# Patient Record
Sex: FEMALE | Race: WHITE
[De-identification: names, ages, dates, MRNs, and addresses within clinical notes are randomized per-mention and may not be internally consistent; named-entity substitution may affect disease eponyms.]

---

## 2021-01-06 ENCOUNTER — HOSPITAL ENCOUNTER (EMERGENCY)
Dept: HOSPITAL 1 - ED | Age: 71
LOS: 1 days | Discharge: HOME | End: 2021-01-07
Payer: COMMERCIAL

## 2021-01-06 VITALS — WEIGHT: 90 LBS | BODY MASS INDEX: 15.36 KG/M2 | HEIGHT: 64 IN

## 2021-01-06 DIAGNOSIS — Z88.6: ICD-10-CM

## 2021-01-06 DIAGNOSIS — G43.909: ICD-10-CM

## 2021-01-06 DIAGNOSIS — M79.7: ICD-10-CM

## 2021-01-06 DIAGNOSIS — Z88.5: ICD-10-CM

## 2021-01-06 DIAGNOSIS — U07.1: Primary | ICD-10-CM

## 2021-01-06 LAB
ALBUMIN SERPL-MCNC: 3.3 G/DL (ref 3.4–5)
ALP SERPL-CCNC: 79 U/L (ref 46–116)
ALT SERPL-CCNC: 19 U/L (ref 14–59)
AST SERPL-CCNC: 25 U/L (ref 15–37)
BASOPHILS NFR BLD: 0.6 % (ref 0.2–1.3)
BILIRUB SERPL-MCNC: 0.9 MG/DL (ref 0.2–1)
BUN SERPL-MCNC: 59 MG/DL (ref 7–18)
CALCIUM SERPL-MCNC: 8.5 MG/DL (ref 8.5–10.1)
CHLORIDE SERPL-SCNC: 107 MMOL/L (ref 98–107)
CO2 SERPL-SCNC: 22.8 MMOL/L (ref 21–32)
CREAT SERPL-MCNC: 2.1 MG/DL (ref 0.6–1)
ERYTHROCYTE [DISTWIDTH] IN BLOOD BY AUTOMATED COUNT: 14 % (ref 12.3–17.7)
GFR SERPLBLD BASED ON 1.73 SQ M-ARVRAT: 25 ML/MIN
GLUCOSE SERPL-MCNC: 131 MG/DL (ref 74–106)
LIPASE SERPL-CCNC: 97 IU/L (ref 73–393)
PLATELET # BLD: 150 X10^3MCL (ref 179–408)
POTASSIUM SERPL-SCNC: 3.8 MMOL/L (ref 3.5–5.1)
PROT SERPL-MCNC: 7.1 G/DL (ref 6.4–8.2)
SODIUM SERPL-SCNC: 144 MMOL/L (ref 136–145)

## 2021-01-06 PROCEDURE — U0003 INFECTIOUS AGENT DETECTION BY NUCLEIC ACID (DNA OR RNA); SEVERE ACUTE RESPIRATORY SYNDROME CORONAVIRUS 2 (SARS-COV-2) (CORONAVIRUS DISEASE [COVID-19]), AMPLIFIED PROBE TECHNIQUE, MAKING USE OF HIGH THROUGHPUT TECHNOLOGIES AS DESCRIBED BY CMS-2020-01-R: HCPCS

## 2021-01-07 VITALS — SYSTOLIC BLOOD PRESSURE: 101 MMHG | DIASTOLIC BLOOD PRESSURE: 56 MMHG

## 2023-04-29 ENCOUNTER — APPOINTMENT (OUTPATIENT)
Dept: GENERAL RADIOLOGY | Age: 73
DRG: 542 | End: 2023-04-29
Payer: COMMERCIAL

## 2023-04-29 ENCOUNTER — HOSPITAL ENCOUNTER (INPATIENT)
Age: 73
LOS: 3 days | Discharge: HOME HEALTH | DRG: 542 | End: 2023-05-03
Attending: EMERGENCY MEDICINE | Admitting: STUDENT IN AN ORGANIZED HEALTH CARE EDUCATION/TRAINING PROGRAM
Payer: COMMERCIAL

## 2023-04-29 DIAGNOSIS — S72.102A CLOSED FRACTURE OF TROCHANTER OF LEFT FEMUR, INITIAL ENCOUNTER (HCC): Primary | ICD-10-CM

## 2023-04-29 DIAGNOSIS — M79.672 ACUTE FOOT PAIN, LEFT: ICD-10-CM
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
## 2023-04-29 PROCEDURE — 73502 X-RAY EXAM HIP UNI 2-3 VIEWS: CPT

## 2023-04-29 PROCEDURE — 73564 X-RAY EXAM KNEE 4 OR MORE: CPT

## 2023-04-29 PROCEDURE — 99285 EMERGENCY DEPT VISIT HI MDM: CPT

## 2023-04-29 PROCEDURE — 73590 X-RAY EXAM OF LOWER LEG: CPT

## 2023-04-29 ASSESSMENT — PAIN DESCRIPTION - LOCATION: LOCATION: LEG;HIP

## 2023-04-29 ASSESSMENT — PAIN - FUNCTIONAL ASSESSMENT: PAIN_FUNCTIONAL_ASSESSMENT: 0-10

## 2023-04-29 ASSESSMENT — PAIN DESCRIPTION - ORIENTATION: ORIENTATION: LEFT

## 2023-04-29 ASSESSMENT — PAIN SCALES - GENERAL: PAINLEVEL_OUTOF10: 10

## 2023-04-30 ENCOUNTER — APPOINTMENT (OUTPATIENT)
Dept: CT IMAGING | Age: 73
DRG: 542 | End: 2023-04-30
Payer: COMMERCIAL

## 2023-04-30 ENCOUNTER — APPOINTMENT (OUTPATIENT)
Dept: GENERAL RADIOLOGY | Age: 73
DRG: 542 | End: 2023-04-30
Payer: COMMERCIAL

## 2023-04-30 PROBLEM — S72.002A FRACTURE, HIP, LEFT, CLOSED, INITIAL ENCOUNTER (HCC): Status: ACTIVE | Noted: 2023-04-30

## 2023-04-30 LAB
ALBUMIN SERPL-MCNC: 3.9 GM/DL (ref 3.4–5)
ALP BLD-CCNC: 103 IU/L (ref 40–128)
ALT SERPL-CCNC: 7 U/L (ref 10–40)
ANION GAP SERPL CALCULATED.3IONS-SCNC: 10 MMOL/L (ref 4–16)
AST SERPL-CCNC: 19 IU/L (ref 15–37)
BACTERIA: NEGATIVE /HPF
BASOPHILS ABSOLUTE: 0 K/CU MM
BASOPHILS RELATIVE PERCENT: 0.6 % (ref 0–1)
BILIRUB SERPL-MCNC: 0.8 MG/DL (ref 0–1)
BILIRUBIN URINE: NEGATIVE MG/DL
BLOOD, URINE: NEGATIVE
BUN SERPL-MCNC: 30 MG/DL (ref 6–23)
CALCIUM SERPL-MCNC: 9.3 MG/DL (ref 8.3–10.6)
CHLORIDE BLD-SCNC: 105 MMOL/L (ref 99–110)
CLARITY: CLEAR
CO2: 24 MMOL/L (ref 21–32)
COLOR: YELLOW
CREAT SERPL-MCNC: 1 MG/DL (ref 0.6–1.1)
DIFFERENTIAL TYPE: ABNORMAL
EOSINOPHILS ABSOLUTE: 0.1 K/CU MM
EOSINOPHILS RELATIVE PERCENT: 1.6 % (ref 0–3)
GFR SERPL CREATININE-BSD FRML MDRD: 59 ML/MIN/1.73M2
GLUCOSE SERPL-MCNC: 92 MG/DL (ref 70–99)
GLUCOSE, URINE: NEGATIVE MG/DL
HCT VFR BLD CALC: 32.6 % (ref 37–47)
HEMOGLOBIN: 10.3 GM/DL (ref 12.5–16)
IMMATURE NEUTROPHIL %: 0.7 % (ref 0–0.43)
IRON: 57 UG/DL (ref 37–145)
KETONES, URINE: NEGATIVE MG/DL
LEUKOCYTE ESTERASE, URINE: ABNORMAL
LYMPHOCYTES ABSOLUTE: 2 K/CU MM
LYMPHOCYTES RELATIVE PERCENT: 28.2 % (ref 24–44)
MCH RBC QN AUTO: 29.7 PG (ref 27–31)
MCHC RBC AUTO-ENTMCNC: 31.6 % (ref 32–36)
MCV RBC AUTO: 93.9 FL (ref 78–100)
MONOCYTES ABSOLUTE: 0.5 K/CU MM
MONOCYTES RELATIVE PERCENT: 7.6 % (ref 0–4)
MUCUS: ABNORMAL HPF
NITRITE URINE, QUANTITATIVE: NEGATIVE
NUCLEATED RBC %: 0 %
PCT TRANSFERRIN: 19 % (ref 10–44)
PDW BLD-RTO: 12.7 % (ref 11.7–14.9)
PH, URINE: 5.5 (ref 5–8)
PLATELET # BLD: 242 K/CU MM (ref 140–440)
PMV BLD AUTO: 9.2 FL (ref 7.5–11.1)
POTASSIUM SERPL-SCNC: 4.1 MMOL/L (ref 3.5–5.1)
PROTEIN UA: ABNORMAL MG/DL
RBC # BLD: 3.47 M/CU MM (ref 4.2–5.4)
RBC URINE: <1 /HPF (ref 0–6)
SEGMENTED NEUTROPHILS ABSOLUTE COUNT: 4.3 K/CU MM
SEGMENTED NEUTROPHILS RELATIVE PERCENT: 61.3 % (ref 36–66)
SODIUM BLD-SCNC: 139 MMOL/L (ref 135–145)
SPECIFIC GRAVITY UA: 1.01 (ref 1–1.03)
SQUAMOUS EPITHELIAL: 1 /HPF
TOTAL IMMATURE NEUTOROPHIL: 0.05 K/CU MM
TOTAL IRON BINDING CAPACITY: 299 UG/DL (ref 250–450)
TOTAL NUCLEATED RBC: 0 K/CU MM
TOTAL PROTEIN: 6.4 GM/DL (ref 6.4–8.2)
TRICHOMONAS: ABNORMAL /HPF
UNSATURATED IRON BINDING CAPACITY: 242 UG/DL (ref 110–370)
UROBILINOGEN, URINE: 1 MG/DL (ref 0.2–1)
WBC # BLD: 7 K/CU MM (ref 4–10.5)
WBC UA: 3 /HPF (ref 0–5)

## 2023-04-30 PROCEDURE — 73630 X-RAY EXAM OF FOOT: CPT

## 2023-04-30 PROCEDURE — 6370000000 HC RX 637 (ALT 250 FOR IP): Performed by: NURSE PRACTITIONER

## 2023-04-30 PROCEDURE — 83550 IRON BINDING TEST: CPT

## 2023-04-30 PROCEDURE — 2580000003 HC RX 258: Performed by: STUDENT IN AN ORGANIZED HEALTH CARE EDUCATION/TRAINING PROGRAM

## 2023-04-30 PROCEDURE — 1200000000 HC SEMI PRIVATE

## 2023-04-30 PROCEDURE — 73700 CT LOWER EXTREMITY W/O DYE: CPT

## 2023-04-30 PROCEDURE — 81001 URINALYSIS AUTO W/SCOPE: CPT

## 2023-04-30 PROCEDURE — 85025 COMPLETE CBC W/AUTO DIFF WBC: CPT

## 2023-04-30 PROCEDURE — 83540 ASSAY OF IRON: CPT

## 2023-04-30 PROCEDURE — 6370000000 HC RX 637 (ALT 250 FOR IP): Performed by: STUDENT IN AN ORGANIZED HEALTH CARE EDUCATION/TRAINING PROGRAM

## 2023-04-30 PROCEDURE — 94761 N-INVAS EAR/PLS OXIMETRY MLT: CPT

## 2023-04-30 PROCEDURE — 36415 COLL VENOUS BLD VENIPUNCTURE: CPT

## 2023-04-30 PROCEDURE — 6360000002 HC RX W HCPCS: Performed by: EMERGENCY MEDICINE

## 2023-04-30 PROCEDURE — 80053 COMPREHEN METABOLIC PANEL: CPT

## 2023-04-30 RX ORDER — LANOLIN ALCOHOL/MO/W.PET/CERES
3 CREAM (GRAM) TOPICAL ONCE
Status: COMPLETED | OUTPATIENT
Start: 2023-05-01 | End: 2023-04-30

## 2023-04-30 RX ORDER — SODIUM CHLORIDE 0.9 % (FLUSH) 0.9 %
5-40 SYRINGE (ML) INJECTION PRN
Status: DISCONTINUED | OUTPATIENT
Start: 2023-04-30 | End: 2023-05-03 | Stop reason: HOSPADM

## 2023-04-30 RX ORDER — ONDANSETRON 4 MG/1
4 TABLET, ORALLY DISINTEGRATING ORAL EVERY 8 HOURS PRN
Status: DISCONTINUED | OUTPATIENT
Start: 2023-04-30 | End: 2023-05-03 | Stop reason: HOSPADM

## 2023-04-30 RX ORDER — POLYETHYLENE GLYCOL 3350 17 G/17G
17 POWDER, FOR SOLUTION ORAL DAILY PRN
Status: DISCONTINUED | OUTPATIENT
Start: 2023-04-30 | End: 2023-05-03 | Stop reason: HOSPADM

## 2023-04-30 RX ORDER — ONDANSETRON 2 MG/ML
4 INJECTION INTRAMUSCULAR; INTRAVENOUS ONCE
Status: DISCONTINUED | OUTPATIENT
Start: 2023-04-30 | End: 2023-05-03 | Stop reason: HOSPADM

## 2023-04-30 RX ORDER — ENOXAPARIN SODIUM 100 MG/ML
30 INJECTION SUBCUTANEOUS DAILY
Status: DISCONTINUED | OUTPATIENT
Start: 2023-04-30 | End: 2023-05-03 | Stop reason: HOSPADM

## 2023-04-30 RX ORDER — OXYCODONE HYDROCHLORIDE 5 MG/1
5 TABLET ORAL EVERY 4 HOURS PRN
Status: DISCONTINUED | OUTPATIENT
Start: 2023-04-30 | End: 2023-05-03 | Stop reason: HOSPADM

## 2023-04-30 RX ORDER — MORPHINE SULFATE 2 MG/ML
2 INJECTION, SOLUTION INTRAMUSCULAR; INTRAVENOUS EVERY 4 HOURS PRN
Status: DISCONTINUED | OUTPATIENT
Start: 2023-04-30 | End: 2023-05-03 | Stop reason: HOSPADM

## 2023-04-30 RX ORDER — FENTANYL CITRATE 50 UG/ML
25 INJECTION, SOLUTION INTRAMUSCULAR; INTRAVENOUS ONCE
Status: COMPLETED | OUTPATIENT
Start: 2023-04-30 | End: 2023-04-30

## 2023-04-30 RX ORDER — SODIUM CHLORIDE 9 MG/ML
INJECTION, SOLUTION INTRAVENOUS PRN
Status: DISCONTINUED | OUTPATIENT
Start: 2023-04-30 | End: 2023-05-03 | Stop reason: HOSPADM

## 2023-04-30 RX ORDER — ONDANSETRON 2 MG/ML
4 INJECTION INTRAMUSCULAR; INTRAVENOUS EVERY 6 HOURS PRN
Status: DISCONTINUED | OUTPATIENT
Start: 2023-04-30 | End: 2023-05-03 | Stop reason: HOSPADM

## 2023-04-30 RX ORDER — SODIUM CHLORIDE 0.9 % (FLUSH) 0.9 %
5-40 SYRINGE (ML) INJECTION EVERY 12 HOURS SCHEDULED
Status: DISCONTINUED | OUTPATIENT
Start: 2023-04-30 | End: 2023-05-03 | Stop reason: HOSPADM

## 2023-04-30 RX ADMIN — OXYCODONE 5 MG: 5 TABLET ORAL at 20:19

## 2023-04-30 RX ADMIN — SODIUM CHLORIDE, PRESERVATIVE FREE 10 ML: 5 INJECTION INTRAVENOUS at 20:19

## 2023-04-30 RX ADMIN — FENTANYL CITRATE 25 MCG: 50 INJECTION, SOLUTION INTRAMUSCULAR; INTRAVENOUS at 05:38

## 2023-04-30 RX ADMIN — Medication 3 MG: at 23:55

## 2023-04-30 ASSESSMENT — ENCOUNTER SYMPTOMS
EYE REDNESS: 0
COUGH: 0
COLOR CHANGE: 0
DIARRHEA: 0
SINUS PAIN: 0
ABDOMINAL PAIN: 0
NAUSEA: 0
WHEEZING: 0
SORE THROAT: 0
SHORTNESS OF BREATH: 0
CHEST TIGHTNESS: 0
VOMITING: 0
BACK PAIN: 0
CONSTIPATION: 0
SINUS PRESSURE: 0

## 2023-04-30 ASSESSMENT — PAIN DESCRIPTION - LOCATION: LOCATION: LEG;HIP

## 2023-04-30 ASSESSMENT — PAIN SCALES - GENERAL
PAINLEVEL_OUTOF10: 7
PAINLEVEL_OUTOF10: 2
PAINLEVEL_OUTOF10: 1

## 2023-04-30 ASSESSMENT — PAIN DESCRIPTION - ORIENTATION: ORIENTATION: LEFT

## 2023-04-30 ASSESSMENT — PAIN - FUNCTIONAL ASSESSMENT: PAIN_FUNCTIONAL_ASSESSMENT: NONE - DENIES PAIN

## 2023-05-01 PROBLEM — E43 SEVERE MALNUTRITION (HCC): Chronic | Status: ACTIVE | Noted: 2023-05-01

## 2023-05-01 LAB
ANION GAP SERPL CALCULATED.3IONS-SCNC: 8 MMOL/L (ref 4–16)
BASOPHILS ABSOLUTE: 0.1 K/CU MM
BASOPHILS RELATIVE PERCENT: 1 % (ref 0–1)
BUN SERPL-MCNC: 25 MG/DL (ref 6–23)
CALCIUM SERPL-MCNC: 8.5 MG/DL (ref 8.3–10.6)
CHLORIDE BLD-SCNC: 106 MMOL/L (ref 99–110)
CO2: 25 MMOL/L (ref 21–32)
CREAT SERPL-MCNC: 1.1 MG/DL (ref 0.6–1.1)
DIFFERENTIAL TYPE: ABNORMAL
EOSINOPHILS ABSOLUTE: 0.2 K/CU MM
EOSINOPHILS RELATIVE PERCENT: 2.4 % (ref 0–3)
GFR SERPL CREATININE-BSD FRML MDRD: 53 ML/MIN/1.73M2
GLUCOSE SERPL-MCNC: 104 MG/DL (ref 70–99)
HCT VFR BLD CALC: 32.8 % (ref 37–47)
HEMOGLOBIN: 9.9 GM/DL (ref 12.5–16)
IMMATURE NEUTROPHIL %: 0.8 % (ref 0–0.43)
LYMPHOCYTES ABSOLUTE: 1.6 K/CU MM
LYMPHOCYTES RELATIVE PERCENT: 25.4 % (ref 24–44)
MCH RBC QN AUTO: 29.7 PG (ref 27–31)
MCHC RBC AUTO-ENTMCNC: 30.2 % (ref 32–36)
MCV RBC AUTO: 98.5 FL (ref 78–100)
MONOCYTES ABSOLUTE: 0.6 K/CU MM
MONOCYTES RELATIVE PERCENT: 9.4 % (ref 0–4)
NUCLEATED RBC %: 0 %
PDW BLD-RTO: 12.8 % (ref 11.7–14.9)
PLATELET # BLD: 240 K/CU MM (ref 140–440)
PMV BLD AUTO: 9.5 FL (ref 7.5–11.1)
POTASSIUM SERPL-SCNC: 4.5 MMOL/L (ref 3.5–5.1)
RBC # BLD: 3.33 M/CU MM (ref 4.2–5.4)
SEGMENTED NEUTROPHILS ABSOLUTE COUNT: 3.7 K/CU MM
SEGMENTED NEUTROPHILS RELATIVE PERCENT: 61 % (ref 36–66)
SODIUM BLD-SCNC: 139 MMOL/L (ref 135–145)
TOTAL IMMATURE NEUTOROPHIL: 0.05 K/CU MM
TOTAL NUCLEATED RBC: 0 K/CU MM
WBC # BLD: 6.1 K/CU MM (ref 4–10.5)

## 2023-05-01 PROCEDURE — 97162 PT EVAL MOD COMPLEX 30 MIN: CPT

## 2023-05-01 PROCEDURE — 1200000000 HC SEMI PRIVATE

## 2023-05-01 PROCEDURE — 80048 BASIC METABOLIC PNL TOTAL CA: CPT

## 2023-05-01 PROCEDURE — 94761 N-INVAS EAR/PLS OXIMETRY MLT: CPT

## 2023-05-01 PROCEDURE — 85025 COMPLETE CBC W/AUTO DIFF WBC: CPT

## 2023-05-01 PROCEDURE — 97530 THERAPEUTIC ACTIVITIES: CPT

## 2023-05-01 PROCEDURE — 6360000002 HC RX W HCPCS: Performed by: STUDENT IN AN ORGANIZED HEALTH CARE EDUCATION/TRAINING PROGRAM

## 2023-05-01 PROCEDURE — 2580000003 HC RX 258: Performed by: STUDENT IN AN ORGANIZED HEALTH CARE EDUCATION/TRAINING PROGRAM

## 2023-05-01 PROCEDURE — 36415 COLL VENOUS BLD VENIPUNCTURE: CPT

## 2023-05-01 PROCEDURE — 97166 OT EVAL MOD COMPLEX 45 MIN: CPT

## 2023-05-01 RX ORDER — HYDROMORPHONE HYDROCHLORIDE 2 MG/1
2 TABLET ORAL EVERY 6 HOURS PRN
Qty: 12 TABLET | Refills: 0 | Status: SHIPPED | OUTPATIENT
Start: 2023-05-01 | End: 2023-05-04

## 2023-05-01 RX ORDER — ONDANSETRON 4 MG/1
4 TABLET, ORALLY DISINTEGRATING ORAL EVERY 8 HOURS PRN
Qty: 90 TABLET | Refills: 0 | Status: SHIPPED | OUTPATIENT
Start: 2023-05-01 | End: 2023-05-31

## 2023-05-01 RX ORDER — POLYETHYLENE GLYCOL 3350 17 G/17G
17 POWDER, FOR SOLUTION ORAL DAILY PRN
Qty: 527 G | Refills: 1 | Status: SHIPPED | OUTPATIENT
Start: 2023-05-01 | End: 2023-05-31

## 2023-05-01 RX ADMIN — SODIUM CHLORIDE, PRESERVATIVE FREE 10 ML: 5 INJECTION INTRAVENOUS at 22:18

## 2023-05-01 RX ADMIN — ONDANSETRON 4 MG: 2 INJECTION INTRAMUSCULAR; INTRAVENOUS at 06:23

## 2023-05-01 ASSESSMENT — PAIN SCALES - GENERAL: PAINLEVEL_OUTOF10: 0

## 2023-05-01 NOTE — DISCHARGE INSTRUCTIONS
Call to schedule follow up hospital follow up appointment:    [address] at [address]  201 [facility], 400 [facility] [address] [facility] [address] [phone]  [address] [place] [address] [phone]   [address] [place] [address]     Call for new patient Primary Care Physician appointment:     Physician Finder [phone]    Dr. Trinidad Claros and Dr. Wayne Lutz [phone] 502 [facility] -[phone] 5601 [place], [place]    Dr. Danuta Arredondo and Ane Bumpers -[phone]  3505 [facility], [address] Ashanti Taylor and Juan C Issa -[phone]  [facility], [address] [address] [facility],3Rd Floor  45 [facility], [address] [place] [address] 1411 [facility] Direct Primary Care [phone]   3651 [facility] 74*    Dr. Deforest Jeans PA and Eris Peterson [place] [phone]  [address] [place] [address], [address] [facility], [address] [address] Wait[address]*) [phone]   [facility]    Dr. Seth Cannon [phone]  [address] [facility], 5501 [facility] 6300 [facility] [phone]  [facility] 31 [place], [place]    Dr. Nieves Phoenix [phone] [address] [facility], [address] Ashanti Claros and Dr. Tawana Mg [phone]  [address] [place] [address], [address] Alexsandra CAPONE and Sylvia Alayessenia [phone]  [address] [place] [address], [address] [facility], [facility] and Cindra Carolina -[phone]   [facility] Alexsandra Sepulveda and Meryle Lincoln -[phone]  [phone], Suite [facility]    Dr. Laquita Bustos, Dr. Jeanna Foote [facility], Joes R Montoya [address]  and Oj Asher 6300 [facility]   [phone]  [address] [place] [address]

## 2023-05-01 NOTE — PLAN OF CARE
Problem: Discharge Planning  Goal: Discharge to home or other facility with appropriate resources  4/30/2023 2155 by Shady Latham LPN  Outcome: Progressing  4/30/2023 1700 by Delio Hook RN  Outcome: Progressing     Problem: Safety - Adult  Goal: Free from fall injury  4/30/2023 2155 by Shady Latham LPN  Outcome: Progressing  4/30/2023 1700 by Delio Hook RN  Outcome: Progressing     Problem: ABCDS Injury Assessment  Goal: Absence of physical injury  4/30/2023 2155 by Shady Latham LPN  Outcome: Progressing  4/30/2023 1708 by Delio Hook RN  Outcome: Progressing  4/30/2023 1700 by Delio Hook RN  Outcome: Progressing     Problem: Pain  Goal: Verbalizes/displays adequate comfort level or baseline comfort level  4/30/2023 2155 by Shady Latham LPN  Outcome: Progressing  4/30/2023 1708 by Delio Hook RN  Outcome: Progressing  4/30/2023 1700 by Delio Hook RN  Outcome: Progressing

## 2023-05-01 NOTE — CONSULTS
2813 [facility],2Nd Floor ACUTE CARE PHYSICAL THERAPY EVALUATION  Stephen Calderon, 1950, 1115/1115-A, 5/1/2023    History  Tribal:  The primary encounter diagnosis was Closed fracture of trochanter of left femur, initial encounter ([address] Diagnoses of Fall, initial encounter and Acute foot pain, left were also pertinent to this visit. Patient  has no past medical history on file. Patient  has a past surgical history that includes Wrist surgery. Subjective:  Patient states:  \"I moved here from [place]\". Pain:  10/10 L hip. Communication with other providers:  Handoff to RN  Restrictions: general precautions, fall risk, tele, pure wick, WBAT L LE    Home Setup/Prior level of function  Social/Functional History  Lives With: Son (son is special needs)  Type of Home: Trailer  Home Layout: One level  Home Access: Stairs to enter with rails  Entrance Stairs - Number of Steps: 4  Entrance Stairs - Rails: Both  Bathroom Shower/Tub: Tub/Shower unit  Bathroom Toilet: Standard  Bathroom Accessibility: Accessible  Home Equipment: Char Avi, 4 wheeled  Has the patient had two or more falls in the past year or any fall with injury in the past year?: No  Receives Help From: Family  ADL Assistance: Independent  Homemaking Assistance: Needs assistance  Homemaking Responsibilities: Yes  Ambulation Assistance: Independent  Transfer Assistance: Independent  Active : No  Occupation: Retired    Examination of body systems (includes body structures/functions, activity/participation limitations):  Observation:  In supine upon arrival  Vision:  [facility]  Hearing:  WFL  Cardiopulmonary:  WFL  Cognition: intact tangential, see OT/SLP note for further evaluation. Musculoskeletal  ROM R/L:  WFL. Strength R/L:  3+/5 R LE, L LE not tested due to safety, fair in function and endurance.     Neuro:  intact    Gait pattern: Pt demonstrates decreased bg, decreased step length, decreased foot clearance, step to gait

## 2023-05-01 NOTE — CARE COORDINATION
Case Management Assessment  Initial Evaluation    Date/Time of Evaluation: 5/1/2023 2:50 PM  Assessment Completed by: Shante Gonsalez RN    If patient is discharged prior to next notation, then this note serves as note for discharge by case management. Patient Name: Estela Santana                   YOB: 1950  Diagnosis: Closed fracture of trochanter of left femur, [facility] Aakash Matter  Fall, initial encounter [W19. XXXA]  Fracture, hip, left, closed, initial encounter [facility]) [S72.002A]  Acute foot pain, left [M79.672]                   Date / Time: 4/29/2023 10:45 PM    Patient Admission Status: Inpatient   Readmission Risk (Low < 19, Mod (19-27), High > 27): Readmission Risk Score: 11.1    Current PCP: No primary care provider on file. PCP verified by CM? No    Chart Reviewed: Yes      History Provided by: Patient  Patient Orientation: Alert and Oriented    Patient Cognition: Alert    Hospitalization in the last 30 days (Readmission):  No    If yes, Readmission Assessment in CM Navigator will be completed. Advance Directives:      Code Status: Full Code   Patient's Primary Decision Maker is: Legal Next of Kin    Primary Decision MakerTaylor Coronel [facility] - [phone]    Discharge Planning:    Patient lives with: Children Type of Home: Trailer/Mobile Home  Primary Care Giver: Self  Patient Support Systems include: Family Members   Current Financial resources: None  Current community resources: None  Current services prior to admission: None            Current DME:              Type of Home Care services:  None    ADLS  Prior functional level: Independent in ADLs/IADLs  Current functional level: Assistance with the following:, Mobility    PT AM-PAC: 17 /24  OT AM-PAC:   /24    Family can provide assistance at DC: Yes  Would you like Case Management to discuss the discharge plan with any other family members/significant others, and if so, who?  Yes  Plans to Return to Present Housing:

## 2023-05-02 LAB
ANION GAP SERPL CALCULATED.3IONS-SCNC: 9 MMOL/L (ref 4–16)
BASOPHILS ABSOLUTE: 0.1 K/CU MM
BASOPHILS RELATIVE PERCENT: 0.7 % (ref 0–1)
BUN SERPL-MCNC: 21 MG/DL (ref 6–23)
CALCIUM SERPL-MCNC: 8.6 MG/DL (ref 8.3–10.6)
CHLORIDE BLD-SCNC: 105 MMOL/L (ref 99–110)
CO2: 26 MMOL/L (ref 21–32)
CREAT SERPL-MCNC: 0.8 MG/DL (ref 0.6–1.1)
DIFFERENTIAL TYPE: ABNORMAL
EOSINOPHILS ABSOLUTE: 0.2 K/CU MM
EOSINOPHILS RELATIVE PERCENT: 2.2 % (ref 0–3)
GFR SERPL CREATININE-BSD FRML MDRD: >60 ML/MIN/1.73M2
GLUCOSE SERPL-MCNC: 94 MG/DL (ref 70–99)
HCT VFR BLD CALC: 32.1 % (ref 37–47)
HEMOGLOBIN: 10.2 GM/DL (ref 12.5–16)
IMMATURE NEUTROPHIL %: 0.7 % (ref 0–0.43)
LYMPHOCYTES ABSOLUTE: 1.6 K/CU MM
LYMPHOCYTES RELATIVE PERCENT: 23.4 % (ref 24–44)
MCH RBC QN AUTO: 29.7 PG (ref 27–31)
MCHC RBC AUTO-ENTMCNC: 31.8 % (ref 32–36)
MCV RBC AUTO: 93.3 FL (ref 78–100)
MONOCYTES ABSOLUTE: 0.6 K/CU MM
MONOCYTES RELATIVE PERCENT: 8 % (ref 0–4)
NUCLEATED RBC %: 0 %
PDW BLD-RTO: 12.7 % (ref 11.7–14.9)
PLATELET # BLD: 244 K/CU MM (ref 140–440)
PMV BLD AUTO: 9 FL (ref 7.5–11.1)
POTASSIUM SERPL-SCNC: 4.4 MMOL/L (ref 3.5–5.1)
RBC # BLD: 3.44 M/CU MM (ref 4.2–5.4)
SEGMENTED NEUTROPHILS ABSOLUTE COUNT: 4.5 K/CU MM
SEGMENTED NEUTROPHILS RELATIVE PERCENT: 65 % (ref 36–66)
SODIUM BLD-SCNC: 140 MMOL/L (ref 135–145)
TOTAL IMMATURE NEUTOROPHIL: 0.05 K/CU MM
TOTAL NUCLEATED RBC: 0 K/CU MM
WBC # BLD: 6.9 K/CU MM (ref 4–10.5)

## 2023-05-02 PROCEDURE — 97116 GAIT TRAINING THERAPY: CPT

## 2023-05-02 PROCEDURE — 97530 THERAPEUTIC ACTIVITIES: CPT

## 2023-05-02 PROCEDURE — 36415 COLL VENOUS BLD VENIPUNCTURE: CPT

## 2023-05-02 PROCEDURE — 80048 BASIC METABOLIC PNL TOTAL CA: CPT

## 2023-05-02 PROCEDURE — 1200000000 HC SEMI PRIVATE

## 2023-05-02 PROCEDURE — 94761 N-INVAS EAR/PLS OXIMETRY MLT: CPT

## 2023-05-02 PROCEDURE — 6370000000 HC RX 637 (ALT 250 FOR IP): Performed by: NURSE PRACTITIONER

## 2023-05-02 PROCEDURE — 97535 SELF CARE MNGMENT TRAINING: CPT

## 2023-05-02 PROCEDURE — 2580000003 HC RX 258: Performed by: STUDENT IN AN ORGANIZED HEALTH CARE EDUCATION/TRAINING PROGRAM

## 2023-05-02 PROCEDURE — 85025 COMPLETE CBC W/AUTO DIFF WBC: CPT

## 2023-05-02 PROCEDURE — 6360000002 HC RX W HCPCS: Performed by: STUDENT IN AN ORGANIZED HEALTH CARE EDUCATION/TRAINING PROGRAM

## 2023-05-02 PROCEDURE — 97110 THERAPEUTIC EXERCISES: CPT

## 2023-05-02 RX ORDER — IBUPROFEN 600 MG/1
600 TABLET ORAL EVERY 6 HOURS PRN
Status: DISCONTINUED | OUTPATIENT
Start: 2023-05-02 | End: 2023-05-03 | Stop reason: HOSPADM

## 2023-05-02 RX ADMIN — SODIUM CHLORIDE, PRESERVATIVE FREE 10 ML: 5 INJECTION INTRAVENOUS at 08:35

## 2023-05-02 RX ADMIN — IBUPROFEN 600 MG: 600 TABLET, FILM COATED ORAL at 11:12

## 2023-05-02 RX ADMIN — SODIUM CHLORIDE, PRESERVATIVE FREE 5 ML: 5 INJECTION INTRAVENOUS at 21:55

## 2023-05-02 ASSESSMENT — PAIN DESCRIPTION - DESCRIPTORS: DESCRIPTORS: ACHING;DISCOMFORT;THROBBING

## 2023-05-02 ASSESSMENT — PAIN DESCRIPTION - ORIENTATION: ORIENTATION: LEFT

## 2023-05-02 ASSESSMENT — PAIN SCALES - GENERAL
PAINLEVEL_OUTOF10: 0
PAINLEVEL_OUTOF10: 9

## 2023-05-02 ASSESSMENT — PAIN DESCRIPTION - PAIN TYPE: TYPE: ACUTE PAIN

## 2023-05-02 ASSESSMENT — PAIN DESCRIPTION - FREQUENCY: FREQUENCY: INTERMITTENT

## 2023-05-02 ASSESSMENT — PAIN DESCRIPTION - LOCATION: LOCATION: LEG;HIP

## 2023-05-02 ASSESSMENT — PAIN - FUNCTIONAL ASSESSMENT: PAIN_FUNCTIONAL_ASSESSMENT: PREVENTS OR INTERFERES SOME ACTIVE ACTIVITIES AND ADLS

## 2023-05-02 ASSESSMENT — PAIN DESCRIPTION - ONSET: ONSET: GRADUAL

## 2023-05-02 NOTE — CARE COORDINATION
CM into see pt to discuss plan for discharge. CM is able to provide transport per [facility]. Pt is unable to obtain home care due to having an out of state insurance. They will only cover [place], [place] and [place]. CM called the insurance company and unable to reach a live person. Unable to obtain answers after 15 min call. CM called [facility] to see if any other options. CM informed that pt needs to call [employer] to discuss. Diego Marshall unable to begin a medicaid because the pt has insurance. CM called [place] with [facility] and they are able to provide transport. CM discussed with home care [facility] and they are unable to accept due to insurance out of state. CM requested radha visit. CM updated IRVING Vale and updated.   CM provided her with senior resources and [facility]. CM provided information if she wants to get medicaid application for [facility]. CM spoke with Holly Araujo OT. Pt is tearful. . CM was informed that pt is improving and did education with pt. CM informed per pt and OT that pt would be able to transfer to car and able to manage steps to mobile home. [facility]  Smitha Sanderson was evaluated today and a DME order was entered for a wheeled walker because she requires this to successfully complete daily living tasks of bathing, toileting, personal cares, and ambulating. A wheeled walker is necessary due to the patient's unsteady gait, upper body weakness, and inability to  an ambulation device; and she can ambulate only by pushing a walker instead of a lesser assistive device such as a cane, crutch, or standard walker. The need for this equipment was discussed with the patient and she understands and is in agreement.

## 2023-05-02 NOTE — CARE COORDINATION
Received referral from [employer], CM. Patient moved to [facility] approx 4 months ago. She has out of state insurance which will not cover anything here. [facility] as also been called. [facility] as she will need to close her current case which is out of state. Patient needs a rolling walker due to hip fracture. I agreed to cover 1x and faxed voucher to [facility]. Patient will be placed on flagged list.  She will need to follow up with OZZIE for [facility].

## 2023-05-03 VITALS
OXYGEN SATURATION: 100 % | SYSTOLIC BLOOD PRESSURE: 105 MMHG | HEIGHT: 64 IN | HEART RATE: 78 BPM | RESPIRATION RATE: 16 BRPM | WEIGHT: 100 LBS | DIASTOLIC BLOOD PRESSURE: 67 MMHG | TEMPERATURE: 97.4 F | BODY MASS INDEX: 17.07 KG/M2

## 2023-05-03 LAB
ANION GAP SERPL CALCULATED.3IONS-SCNC: 9 MMOL/L (ref 4–16)
BASOPHILS ABSOLUTE: 0.1 K/CU MM
BASOPHILS RELATIVE PERCENT: 0.7 % (ref 0–1)
BUN SERPL-MCNC: 24 MG/DL (ref 6–23)
CALCIUM SERPL-MCNC: 8.1 MG/DL (ref 8.3–10.6)
CHLORIDE BLD-SCNC: 106 MMOL/L (ref 99–110)
CO2: 25 MMOL/L (ref 21–32)
CREAT SERPL-MCNC: 1.1 MG/DL (ref 0.6–1.1)
DIFFERENTIAL TYPE: ABNORMAL
EOSINOPHILS ABSOLUTE: 0.1 K/CU MM
EOSINOPHILS RELATIVE PERCENT: 1.7 % (ref 0–3)
GFR SERPL CREATININE-BSD FRML MDRD: 53 ML/MIN/1.73M2
GLUCOSE SERPL-MCNC: 104 MG/DL (ref 70–99)
HCT VFR BLD CALC: 31.3 % (ref 37–47)
HEMOGLOBIN: 9.9 GM/DL (ref 12.5–16)
IMMATURE NEUTROPHIL %: 1.1 % (ref 0–0.43)
LYMPHOCYTES ABSOLUTE: 1.5 K/CU MM
LYMPHOCYTES RELATIVE PERCENT: 20.5 % (ref 24–44)
MCH RBC QN AUTO: 30 PG (ref 27–31)
MCHC RBC AUTO-ENTMCNC: 31.6 % (ref 32–36)
MCV RBC AUTO: 94.8 FL (ref 78–100)
MONOCYTES ABSOLUTE: 0.6 K/CU MM
MONOCYTES RELATIVE PERCENT: 8.3 % (ref 0–4)
NUCLEATED RBC %: 0 %
PDW BLD-RTO: 12.8 % (ref 11.7–14.9)
PLATELET # BLD: 256 K/CU MM (ref 140–440)
PMV BLD AUTO: 9.2 FL (ref 7.5–11.1)
POTASSIUM SERPL-SCNC: 4.3 MMOL/L (ref 3.5–5.1)
RBC # BLD: 3.3 M/CU MM (ref 4.2–5.4)
SEGMENTED NEUTROPHILS ABSOLUTE COUNT: 4.8 K/CU MM
SEGMENTED NEUTROPHILS RELATIVE PERCENT: 67.7 % (ref 36–66)
SODIUM BLD-SCNC: 140 MMOL/L (ref 135–145)
TOTAL IMMATURE NEUTOROPHIL: 0.08 K/CU MM
TOTAL NUCLEATED RBC: 0 K/CU MM
WBC # BLD: 7.1 K/CU MM (ref 4–10.5)

## 2023-05-03 PROCEDURE — 97530 THERAPEUTIC ACTIVITIES: CPT

## 2023-05-03 PROCEDURE — 80048 BASIC METABOLIC PNL TOTAL CA: CPT

## 2023-05-03 PROCEDURE — 36415 COLL VENOUS BLD VENIPUNCTURE: CPT

## 2023-05-03 PROCEDURE — 85025 COMPLETE CBC W/AUTO DIFF WBC: CPT

## 2023-05-03 PROCEDURE — 97110 THERAPEUTIC EXERCISES: CPT

## 2023-05-03 PROCEDURE — 6370000000 HC RX 637 (ALT 250 FOR IP): Performed by: NURSE PRACTITIONER

## 2023-05-03 PROCEDURE — 2580000003 HC RX 258: Performed by: STUDENT IN AN ORGANIZED HEALTH CARE EDUCATION/TRAINING PROGRAM

## 2023-05-03 PROCEDURE — 94761 N-INVAS EAR/PLS OXIMETRY MLT: CPT

## 2023-05-03 RX ADMIN — SODIUM CHLORIDE, PRESERVATIVE FREE 10 ML: 5 INJECTION INTRAVENOUS at 10:06

## 2023-05-03 RX ADMIN — IBUPROFEN 600 MG: 600 TABLET, FILM COATED ORAL at 09:50

## 2023-05-03 ASSESSMENT — PAIN DESCRIPTION - LOCATION
LOCATION: BACK;ARM
LOCATION: ARM;BACK

## 2023-05-03 ASSESSMENT — PAIN SCALES - GENERAL
PAINLEVEL_OUTOF10: 10
PAINLEVEL_OUTOF10: 10

## 2023-05-03 ASSESSMENT — PAIN - FUNCTIONAL ASSESSMENT
PAIN_FUNCTIONAL_ASSESSMENT: ACTIVITIES ARE NOT PREVENTED
PAIN_FUNCTIONAL_ASSESSMENT: PREVENTS OR INTERFERES SOME ACTIVE ACTIVITIES AND ADLS

## 2023-05-03 ASSESSMENT — PAIN DESCRIPTION - ORIENTATION
ORIENTATION: LEFT
ORIENTATION: LEFT

## 2023-05-03 ASSESSMENT — PAIN DESCRIPTION - DESCRIPTORS
DESCRIPTORS: SHARP
DESCRIPTORS: SHARP

## 2023-05-03 NOTE — PROGRESS NOTES
05/02/23 1431   Encounter Summary   Encounter Overview/Reason  Initial Encounter   Service Provided For: Patient   Referral/Consult From: [facility]   Last Encounter  05/02/23  (patient was in good spirit during the visit. patient said \"everyone is treating me well here. \")   Complexity of Encounter Low   Begin Time 1430   End Time  1436   Total Time Calculated 6 min   Encounter    Type Initial Screen/Assessment   Spiritual/Emotional needs   Type Spiritual Support   Grief, Loss, and Adjustments   Type Adjustment to illness   Assessment/Intervention/Outcome   Assessment Calm;Coping; Hopeful   Intervention Empowerment; Active listening;Sustaining Presence/Ministry of presence   Outcome Coping;Encouraged;Expressed Gratitude;Receptive   Plan and Referrals   Plan/Referrals No future visits requested
05/02/23 1431   Encounter Summary   Encounter Overview/Reason  Initial Encounter   Service Provided For: Patient   Referral/Consult From: [facility]   Last Encounter  05/02/23  (patient was in good spirit during the visit. patient said \"everyone is treating me well here. \")   Complexity of Encounter Low   Begin Time 1430   End Time  1436   Total Time Calculated 6 min   Encounter    Type Initial Screen/Assessment   Spiritual/Emotional needs   Type Spiritual Support   Grief, Loss, and Adjustments   Type Adjustment to illness   Assessment/Intervention/Outcome   Assessment Calm;Coping; Hopeful   Intervention Empowerment; Active listening;Sustaining Presence/Ministry of presence   Outcome Coping;Encouraged;Expressed Gratitude;Receptive   Plan and Referrals   Plan/Referrals No future visits requested
Comprehensive Nutrition Assessment    Type and Reason for Visit:  Initial (Low BMI)    Nutrition Recommendations/Plan:   Trial strawberry diabetic oral nutrition supplement, will offer standard when available   Consider antiemetic regimen   Encourage small, more frequent snacks/meals   Endorse adequate PO intakes/ONS Intake and record in I/O      Malnutrition Assessment:  Malnutrition Status:  Severe malnutrition (05/01/23 1047)    Context:  Social/Environmental Circumstances     Findings of the 6 clinical characteristics of malnutrition:  Energy Intake:  Less than 75% estimated energy requirements for 3 months or longer  Weight Loss:  Unable to assess     Body Fat Loss:  Severe body fat loss Orbital, Triceps   Muscle Mass Loss:  Severe muscle mass loss Temples (temporalis), Clavicles (pectoralis & deltoids)  Fluid Accumulation:  Unable to assess     Strength:  Not Performed    Nutrition Assessment:    Admitted with mechanical fall s/p hip fracture. Limited PMH. Pt on regular diet, barely touched breakfast due to feeling not well with N/V/headaches from last night's pain medication? Addressed to MD at bedside. Pt c/o reduced po intakes s/s anemia, low BP, and dizziness at home. Discussed adequate nutrition and iron-rich foods. Unable to provide UBW. Reports son who lives with her helps with meals, but pt does not eat much. Daughter brought pt gingerale and propel, sipping on some liquids. Pt likes Ensure complete, strawberry, will offer during stay. Observed significant wasting, meets criteria for malnutrition. Follow as high nutrition risk. Nutrition Related Findings:    GFR 53, Glu 104 Wound Type: None       Current Nutrition Intake & Therapies:    Average Meal Intake: 0%, Refusing to eat  Average Supplements Intake: None Ordered  ADULT DIET;  Regular    Anthropometric Measures:  Height: 5' 4\" (162.6 cm)  Ideal Body Weight (IBW): 120 lbs (55 kg)    Admission Body Weight:  (estimated 100 lb)  Current Body
Occupational Therapy      Occupational Therapy Treatment Note    Name: Radha Williamson MRN: [id number] :   1950   Date:  5/3/2023   Admission Date: 2023 Room:  [address] [place] [address]-A     Primary Problem: Lt hip greater trochanteric fracture    Restrictions/Precautions:  General Precautions, Fall Risk, WBAT Lt LE, Pocket Telemetry, Bed/chair alarm      Communication with other providers: GERALD Ferrell    Subjective:  Patient states: \"My whole left side is sore today! \"   Pain: Pt reported 10/10 pain in Lt shoulder, tricep, and lat area    Objective:    Observation: Pt received in supine upon OT arrival. Agreeable to treatment with encouragement. Voicing Lt UE and shoulder pain but with no bruising noted. LPN notified. Objective Measures: A & O x 4. HR reached 150 BPM with functional mobility in room per LPN and telemetry tech, recovered to stable readings at rest    Treatment, including education:  Therapeutic Activity Training:   Therapeutic activity training was instructed today. Cues were given for safety, sequence, UE/LE placement, awareness, and balance. Therapeutic Exercise:  Cues were given for technique, safety, recruitment, and rationale. Cues were verbal and/or tactile. Pt received in supine upon OT arrival. Pt re-educated on role of OT, POC, WBAT status Lt LE, and importance of EOB/OOB activity. Pt transferred supine to sitting EOB SBA with HOB elevated to 30'. Increased time/effort required for transition. Pt able to sit at EOB level SBA with good static sitting balance. Pt complaining of Lt shoulder, tricep, and lat pain but no bruising. Pt educated that she engaged in ambulation yesterday with increased weight-bearing through Lt UE to minimize Lt hip pain. Pt educated on importance of Lt UE AROM to improve muscle soreness. Pt engaged in 20 reps of Lt UE active shoulder flexion, active abduction, elbow flexion/extension, and forward/reverse shoulder rolls.  Pt able to demonstrate WFL ROM in all planes
Occupational Therapy    Occupational Therapy Treatment Note    Name: Delmar Paris MRN: [id number] :   1950   Date:  2023   Admission Date: 2023 Room:  [address] [place] [address]-A     Primary Problem: Lt hip greater trochanteric fracture    Restrictions/Precautions: General Precautions, Fall Risk, WBAT Lt LE, Bed/chair alarm     Communication with other providers: GRICELDA Dash, GERALD Herrera    Subjective:  Patient states: \"The  says my insurance won't approve any more therapy when I'm out of the hospital!\"   Pain: Pt reported 5/10 acute pain in Lt hip at rest    Objective:    Observation: Pt received sitting in chair upon OT arrival. Agreeable to treatment. Upset/tearful about insurance not covering further therapy at discharge. Therapist provided emotional support and extensive education during session. Objective Measures: A & O x 4    Treatment, including education:  Self Care Training:   Cues were given for safety, sequence, UE/LE placement, visual cues, and balance. Therapeutic Activity Training:   Therapeutic activity training was instructed today. Cues were given for safety, sequence, UE/LE placement, awareness, and balance. Pt received sitting in chair upon OT arrival. Pt re-educated on role of OT, POC, WBAT status Lt LE, and importance of OOB activity. Pt provided trial reacher and sock aid and educated on use of devices to improve performance with LB ADLs during recovery. Pt able to don Rt sock while seated SBA by flexing hip to reach foot and able to don Lt sock SBA using sock aid with min coaching. Able to doff Lt sock as well SBA using reacher with min coaching. Pt donned clean sweat pants seated at chair level CGA. Pt able to thread Rt leg through opening without assist and thread Lt leg using reacher with increased time/effort. Able to manage sweats to knees while seated without assist and up to hips in standing with CGA for steadying.  Pt able to stand from chair CGA with min cues for
Outpatient Pharmacy Progress Note for Meds-to-Beds    Total number of Prescriptions Filled: 2  The following medications were dispensed to the patient during the discharge process:  Hydromorphone  Zofran    Additional Documentation:  Medication(s) were delivered to the patient's room prior to discharge  Patient stated that she has controlled her pain with ibuprofen. Patient encouraged not to take hydromorphone if ibuprofen is adequate for pain control. Thank you for letting us serve your patients.   [facility]    [address], [address] [facility]    Phone: [phone]    Fax: [phone]
Physical Therapy    Physical Therapy Treatment Note  Name: Jasmyne Armas MRN: [id number] :   1950   Date:  2023   Admission Date: 2023 Room:  [address] [place] [address]   Restrictions/Precautions:          general precautions, fall risk, tele, pure wick, WBAT L LE  diagnosed with a Lt hip greater trochanteric fracture  Communication with other providers:  nurse notified pt c/o pain but doesn't want strong pain meds and cannot take tylenol and is requesting motrin. Subjective:  Patient states:  pt pleasant and agreeable to tx. Pt wanting to return home and [place] trail [place] wide steps and one hand rail. Pt reports family is checking on ramp but wanting to work on steps. Pain:   Location, Type, Intensity (0/10 to 10/10):  rates pain in LLE 6 but very guarded and at times wincing. Objective:    Observation:  alert and oriented with hospital glove on right hand and reports aold injury and decreased use/ strength. Treatment, including education/measures:  Pt incontinent of urine and dependent to change depends at start of tx session. Sup to sit with sba but needing increased time and effort using UE to assist left leg to EOB. Sit<=>stand from bed and chair min assist and cues needing increased time and effort. Amb min assist with rw 5' x 2, 15' x 1 with several standing rest due to c/o pain. Pt has very small step to antalgic gt. Up/down 3 steps with bilateral rails  mod assist and cues  Ex in sup: pt reports increased pain with isometric ex and cues to do slowly  Trunk stretches with deep breathing   10 reps aps  5 reps quad sets  5 reps glut sets  Safety  Patient left safely in the chair, with call light/phone in reach with alarm applied. Gait belt was used for transfers and gait.      Assessment / Impression:      Patient's tolerance of treatment:  good   Adverse Reaction: na  Significant change in status and impact:  na  Barriers to improvement:  pain, strength, and safety  Plan for Next
Physician Progress Note      Ronny Youssef  [facility] #:                  [id number]  :                       1950  ADMIT DATE:       2023 10:45 PM  [address] [place] DATE:  RESPONDING  PROVIDER #:        Matt Zayas MD          QUERY TEXT:    Pt admitted with left hip fracture and has severe malnutrition documented in   Dietitian note. Please further specify type of malnutrition with documentation   in the medical record. The medical record reflects the following:  Risk Factors: L Hip fracture  Clinical Indicators: Dietitian states Severe malnutrition, Less than 75%   estimated energy requirements for 3 months, Severe body fat loss, Severe   muscle mass loss  Treatment: ONS, Labs, Dietitian consult    Thank Lisa Little [phone]    ASPEN Criteria:    https://aspenjournals. onlinelibrary. gann. com/doi/full/10.1177/051083946298553  5  Options provided:  -- Severe Malnutrition  -- Other - I will add my own diagnosis  -- Disagree - Not applicable / Not valid  -- Disagree - Clinically unable to determine / Unknown  -- Refer to Clinical Documentation Reviewer    PROVIDER RESPONSE TEXT:    This patient has severe malnutrition. Query created by: Philipp Sánchez on 2023 3:19 PM      QUERY TEXT:    Pt admitted with Left hip fracture. Pt noted to have osteopenic changes. If   possible, please document in progress notes and discharge summary if you are   evaluating and/or treating any of the following: The medical record reflects the following:  Risk Factors: Advanced age, HX of osteopenia  Clinical Indicators: H&P states \" CT scan did show left hip fracture as well   as osteopenic changes in many of her bones. \"  Treatment: imaging, Ortho consult, recommended prolia    Thank you,  Osman Little [phone]  Options provided:  -- Pathological Left hip fracture due to osteopenia  -- Other - I will add my own diagnosis  -- Disagree - Not applicable / Not
V2.0  [facility] Hospitalist Progress Note      Name:  Carly Botello /Age/Sex: 1950  (78 y.o. female)   MRN & CSN:  [id number] & [id number] Encounter Date/Time: 2023 10:58 AM EDT    [facility] PCP: No primary care provider on file. Hospital Day: 3    Assessment and Plan:   Carly Botello is a 68 y.o. female with no documented past medical history who presents with Fracture, hip, left, closed, initial encounter ([address]     Left hip fracture  Patient with a fall 1 week ago and landed on her hip. Having difficulty with ambulation. Orthopedics consulted, no plan for surgery  Strict bedrest  Pain control  PT/OT once cleared by orthopedics     Osteopenia  Patient with osteopenic changes on imaging. Would benefit from having a DEXA scan and other age-related testing done. Recommend DEXA scan as an outpatient  Likely benefit from bisphosphonate therapy or Prolia     Anemia  Patient with no baseline in our system  Iron studies sent    The patient was admitted to the hospital because of a mechanical fall 1 week ago leading to left hip fracture causing difficulty with ambulation. Orthopedic was consulted who recommended conservative management. Dilaudid pain medication to be ordered for 3 days. Would recommend to follow-up with PCP for further pain management afterwards. Patient wants home with home health care as the patient has a son at home and further responsibility that she wants to take care of. Patient is requesting for a ramp with a rolling walker because of the fact that patient's house has 3-4 steps of stairs for safety purposes. Patient does not have PCP, Kemar Kendrick can not be set up. CM working on placement     It was also noted that the patient has osteopenia actually age-related that caused this issue. Recommend DEXA scan to be done outpatient. Likely would benefit from bisphosphonate therapy or Prolia but needs further evaluation with DEXA scans prior to starting the dose.      Patient
[] 2   [] 3   [] 3   [] 4     4. Putting on and taking off regular upper body clothing? [] 1   [] 2   [] 2   [] 3   [x] 3    [] 4      5. Taking care of personal grooming such as brushing teeth? [] 1   [] 2    [] 2 [] 3    [x] 3   [] 4      6. Eating meals? [] 1   [] 2   [] 2   [] 3   [] 3   [x] 4      Raw Score:  15   [24=0% impaired(CH), 23=1-19%(CI), 20-22=20-39%(CJ), 15-19=40-59%(CK), 10-14=60-79%(CL), 7-9=80-99%(CM), 6=100%(CN)]     Treatment:  Therapeutic Activity Training:   Therapeutic activity training was instructed today. Cues were given for safety, sequence, UE/LE placement, awareness, and balance. Activities performed today included bed mobility training, UB/LB dressing tasks, transfer training, short household mobility with RW, education on role of OT, POC, WBAT status Lt LE, importance of EOB/OOB activity, recovery timeline, reasonable goals by d/c, d/c planning and recommendations    Safety Measures: Gait belt used, Left in Bed, Alarm in place    Assessment:  Pt is a 68year old female with no past medical history on file. Pt admitted following a fall and diagnosed with a Lt hip greater trochanteric fracture. Pt undergoing conservative treatment per ortho and has orders to be WBAT Lt LE. Pt lives at home with her son and at baseline is independent with ADLs, most household IADLs, and ambulates without an AD. Pt currently presents with the above impairments. Recommend continued OT services in SNF at discharge. Complexity: Moderate  Prognosis: Good  Plan: 4+x/week    Goals:  1. Pt will complete all aspects of bed mobility for EOB/OOB ADLs mod I with HOB flat  2. Pt will complete UB/LB bathing min A with setup using long handled sponge PRN  3. Pt will complete all aspects of LB dressing mod A with setup using AE PRN  4. Pt will complete all functional transfers to and from bed, chair, toilet, shower chair SBA/good safety awareness  5.  Pt will ambulate HH distance to bathroom for toileting CGA
Reason for exam:->left hip pain unable to bear weight fall Decision Support Exception - unselect if not a suspected or confirmed emergency medical condition->Emergency Medical Condition (MA) Reason for Exam: left hip pain unable to bear weight fall FINDINGS: Bones: Diffuse osteopenia. Degenerative changes are seen at the pubic symphysis, hip joints, sacroiliac joints and spine. There is a fracture seen through the greater trochanter of the proximal left femur, best appreciated on coronal imaging. Soft Tissue: Moderate bladder distension. Scattered stool seen within the colon and rectum with mild rectal distension. No soft tissue mass. 1. Diffuse osteopenia, with an acute fracture noted through the left greater trochanter. No intertrochanteric extension is identified, though this fracture pattern can be associated with an occult intertrochanteric fracture frequently. Fracture extension is best assessed in these patients with MR imaging.              Electronically signed by INA Fisher CNP on 5/2/2023 at 11:40 AM

## 2023-05-03 NOTE — DISCHARGE SUMMARY
HISTORY: fall TECHNOLOGIST PROVIDED HISTORY: Reason for exam:->fall Reason for Exam: fall Additional signs and symptoms: knee pain; ORDERING SYSTEM PROVIDED HISTORY: fall TECHNOLOGIST PROVIDED HISTORY: Reason for exam:->fall Reason for Exam: fall Additional signs and symptoms: leg pain , hip pain; ORDERING SYSTEM PROVIDED HISTORY: fall TECHNOLOGIST PROVIDED HISTORY: Reason for exam:->fall Reason for Exam: fall Additional signs and symptoms: leg pain FINDINGS: Left hip: Pelvis/left hip: No acute pelvic fracture. Osteopenia. Degenerative changes are seen in the pubic symphysis. The visualized portion of the left proximal femur is intact, without fracture or dislocation. Minimal osteophytes seen at the greater trochanter. Left knee: No fracture seen within the knee. No significant joint effusion. Osteopenia. Mild joint space narrowing is seen. Left tib-fib: Diffuse osteopenia. Ankle mortise appears intact. 1. No acute fracture seen within the pelvis, left hip, knee, or tibia/fibula. XR TIBIA FIBULA LEFT (2 VIEWS)    Result Date: 4/29/2023  EXAMINATION: FOUR XRAY VIEWS OF THE LEFT KNEE; TWO XRAY VIEWS OF THE LEFT HIP; 2 XRAY VIEWS OF THE LEFT TIBIA AND FIBULA 4/29/2023 10:06 pm COMPARISON: None. HISTORY: ORDERING SYSTEM PROVIDED HISTORY: fall TECHNOLOGIST PROVIDED HISTORY: Reason for exam:->fall Reason for Exam: fall Additional signs and symptoms: knee pain; ORDERING SYSTEM PROVIDED HISTORY: fall TECHNOLOGIST PROVIDED HISTORY: Reason for exam:->fall Reason for Exam: fall Additional signs and symptoms: leg pain , hip pain; ORDERING SYSTEM PROVIDED HISTORY: fall TECHNOLOGIST PROVIDED HISTORY: Reason for exam:->fall Reason for Exam: fall Additional signs and symptoms: leg pain FINDINGS: Left hip: Pelvis/left hip: No acute pelvic fracture. Osteopenia. Degenerative changes are seen in the pubic symphysis. The visualized portion of the left proximal femur is intact, without fracture or dislocation.  Minimal
fall Additional signs and symptoms: leg pain , hip pain; ORDERING SYSTEM PROVIDED HISTORY: fall TECHNOLOGIST PROVIDED HISTORY: Reason for exam:->fall Reason for Exam: fall Additional signs and symptoms: leg pain FINDINGS: Left hip: Pelvis/left hip: No acute pelvic fracture. Osteopenia. Degenerative changes are seen in the pubic symphysis. The visualized portion of the left proximal femur is intact, without fracture or dislocation. Minimal osteophytes seen at the greater trochanter. Left knee: No fracture seen within the knee. No significant joint effusion. Osteopenia. Mild joint space narrowing is seen. Left tib-fib: Diffuse osteopenia. Ankle mortise appears intact. 1. No acute fracture seen within the pelvis, left hip, knee, or tibia/fibula. XR FOOT LEFT (MIN 3 VIEWS)    Result Date: 4/30/2023  EXAMINATION: THREE XRAY VIEWS OF THE LEFT FOOT 4/30/2023 12:18 am COMPARISON: None. HISTORY: ORDERING SYSTEM PROVIDED HISTORY: left foot pain hard to bear weight TECHNOLOGIST PROVIDED HISTORY: Reason for exam:->left foot pain hard to bear weight Reason for Exam: fall Additional signs and symptoms: left foot pain hard to bear weight FINDINGS: Diffuse osteopenia. No fracture or dislocation is identified. Mild degenerative change at the 1st metatarsal-phalangeal joint. Lisfranc joint appears intact. Calcaneal spur noted at the plantar aponeurosis. Osteoarthritic changes are seen at the 1st metatarsal-phalangeal joint and within the interphalangeal joints, though no acute fracture or dislocation. Hallux valgus deformity. CT HIP LEFT WO CONTRAST    Result Date: 4/30/2023  EXAMINATION: CT OF THE LEFT HIP WITHOUT CONTRAST 4/30/2023 2:08 am TECHNIQUE: CT of the left hip was performed without the administration of intravenous contrast.  Multiplanar reformatted images are provided for review.  Automated exposure control, iterative reconstruction, and/or weight based adjustment of the mA/kV was utilized to

## 2023-05-03 NOTE — CARE COORDINATION
Received call from [address]. Patient has a Medicare Plan from [place]. It did cover her discharge prescriptions and she has a small copay for 2 of them. I agreed to cover the cost 1x at [address]. Voucher does not cover the OTC so they are reaching out to patient for that. If patient is going to continue to reside in [place] [address] she should consider reaching out to Medicare to change plans. All Medicare plans are based on county patient lives in so she will not have PCP coverage with her current plan.     Patient will remain on flagged list.

## 2023-05-03 NOTE — PLAN OF CARE
Problem: Discharge Planning  Goal: Discharge to home or other facility with appropriate resources  Outcome: Adequate for Discharge     Problem: Safety - Adult  Goal: Free from fall injury  Outcome: Adequate for Discharge     Problem: ABCDS Injury Assessment  Goal: Absence of physical injury  Outcome: Adequate for Discharge     Problem: Pain  Goal: Verbalizes/displays adequate comfort level or baseline comfort level  Outcome: Adequate for Discharge     Problem: Nutrition Deficit:  Goal: Optimize nutritional status  Outcome: Adequate for Discharge     Problem: Skin/Tissue Integrity  Goal: Absence of new skin breakdown  Description: 1. Monitor for areas of redness and/or skin breakdown  2. Assess vascular access sites hourly  3. Every 4-6 hours minimum:  Change oxygen saturation probe site  4. Every 4-6 hours:  If on nasal continuous positive airway pressure, respiratory therapy assess nares and determine need for appliance change or resting period.   Outcome: Adequate for Discharge

## 2024-02-23 ENCOUNTER — APPOINTMENT (OUTPATIENT)
Dept: CT IMAGING | Age: 74
DRG: 853 | End: 2024-02-23
Payer: MEDICARE

## 2024-02-23 ENCOUNTER — HOSPITAL ENCOUNTER (INPATIENT)
Age: 74
LOS: 6 days | Discharge: NURSING, REHAB OR LONG-TERM CARE | DRG: 853 | End: 2024-02-29
Attending: EMERGENCY MEDICINE | Admitting: STUDENT IN AN ORGANIZED HEALTH CARE EDUCATION/TRAINING PROGRAM
Payer: MEDICARE

## 2024-02-23 DIAGNOSIS — L89.159 PRESSURE INJURY OF SKIN OF SACRAL REGION, UNSPECIFIED INJURY STAGE: ICD-10-CM

## 2024-02-23 DIAGNOSIS — L89.150 PRESSURE INJURY OF SACRAL REGION, UNSTAGEABLE (HCC): Primary | ICD-10-CM

## 2024-02-23 DIAGNOSIS — L03.319 CELLULITIS OF SACRAL REGION: ICD-10-CM

## 2024-02-23 DIAGNOSIS — S31.000A SACRAL WOUND, INITIAL ENCOUNTER: ICD-10-CM

## 2024-02-23 DIAGNOSIS — A41.9 SEPSIS WITHOUT ACUTE ORGAN DYSFUNCTION, DUE TO UNSPECIFIED ORGANISM (HCC): ICD-10-CM

## 2024-02-23 LAB
ALBUMIN SERPL-MCNC: 3.3 GM/DL (ref 3.4–5)
ALP BLD-CCNC: 121 IU/L (ref 40–129)
ALT SERPL-CCNC: 6 U/L (ref 10–40)
ANION GAP SERPL CALCULATED.3IONS-SCNC: 14 MMOL/L (ref 7–16)
AST SERPL-CCNC: 14 IU/L (ref 15–37)
BASOPHILS ABSOLUTE: 0.1 K/CU MM
BASOPHILS RELATIVE PERCENT: 0.2 % (ref 0–1)
BILIRUB SERPL-MCNC: 0.9 MG/DL (ref 0–1)
BUN SERPL-MCNC: 17 MG/DL (ref 6–23)
CALCIUM SERPL-MCNC: 9 MG/DL (ref 8.3–10.6)
CHLORIDE BLD-SCNC: 101 MMOL/L (ref 99–110)
CO2: 24 MMOL/L (ref 21–32)
CREAT SERPL-MCNC: 0.8 MG/DL (ref 0.6–1.1)
DIFFERENTIAL TYPE: ABNORMAL
EOSINOPHILS ABSOLUTE: 0 K/CU MM
EOSINOPHILS RELATIVE PERCENT: 0 % (ref 0–3)
GFR SERPL CREATININE-BSD FRML MDRD: >60 ML/MIN/1.73M2
GLUCOSE SERPL-MCNC: 136 MG/DL (ref 70–99)
HCT VFR BLD CALC: 35.5 % (ref 37–47)
HEMOGLOBIN: 11.2 GM/DL (ref 12.5–16)
IMMATURE NEUTROPHIL %: 1.4 % (ref 0–0.43)
LACTIC ACID, SEPSIS: 1.5 MMOL/L (ref 0.4–2)
LYMPHOCYTES ABSOLUTE: 1.1 K/CU MM
LYMPHOCYTES RELATIVE PERCENT: 4.5 % (ref 24–44)
MCH RBC QN AUTO: 29.6 PG (ref 27–31)
MCHC RBC AUTO-ENTMCNC: 31.5 % (ref 32–36)
MCV RBC AUTO: 93.7 FL (ref 78–100)
MONOCYTES ABSOLUTE: 0.8 K/CU MM
MONOCYTES RELATIVE PERCENT: 3.2 % (ref 0–4)
NUCLEATED RBC %: 0 %
PDW BLD-RTO: 13 % (ref 11.7–14.9)
PLATELET # BLD: 461 K/CU MM (ref 140–440)
PMV BLD AUTO: 9.4 FL (ref 7.5–11.1)
POTASSIUM SERPL-SCNC: 3.1 MMOL/L (ref 3.5–5.1)
RBC # BLD: 3.79 M/CU MM (ref 4.2–5.4)
SEGMENTED NEUTROPHILS ABSOLUTE COUNT: 22 K/CU MM
SEGMENTED NEUTROPHILS RELATIVE PERCENT: 90.7 % (ref 36–66)
SODIUM BLD-SCNC: 139 MMOL/L (ref 135–145)
TOTAL IMMATURE NEUTOROPHIL: 0.33 K/CU MM
TOTAL NUCLEATED RBC: 0 K/CU MM
TOTAL PROTEIN: 6.7 GM/DL (ref 6.4–8.2)
WBC # BLD: 24.3 K/CU MM (ref 4–10.5)

## 2024-02-23 PROCEDURE — 6360000004 HC RX CONTRAST MEDICATION: Performed by: EMERGENCY MEDICINE

## 2024-02-23 PROCEDURE — 96375 TX/PRO/DX INJ NEW DRUG ADDON: CPT

## 2024-02-23 PROCEDURE — 96365 THER/PROPH/DIAG IV INF INIT: CPT

## 2024-02-23 PROCEDURE — 74177 CT ABD & PELVIS W/CONTRAST: CPT

## 2024-02-23 PROCEDURE — 2580000003 HC RX 258: Performed by: EMERGENCY MEDICINE

## 2024-02-23 PROCEDURE — 99285 EMERGENCY DEPT VISIT HI MDM: CPT

## 2024-02-23 PROCEDURE — 6360000002 HC RX W HCPCS: Performed by: EMERGENCY MEDICINE

## 2024-02-23 PROCEDURE — 2140000000 HC CCU INTERMEDIATE R&B

## 2024-02-23 PROCEDURE — 80053 COMPREHEN METABOLIC PANEL: CPT

## 2024-02-23 PROCEDURE — 87040 BLOOD CULTURE FOR BACTERIA: CPT

## 2024-02-23 PROCEDURE — 83605 ASSAY OF LACTIC ACID: CPT

## 2024-02-23 PROCEDURE — 85025 COMPLETE CBC W/AUTO DIFF WBC: CPT

## 2024-02-23 RX ORDER — FENTANYL CITRATE 50 UG/ML
25 INJECTION, SOLUTION INTRAMUSCULAR; INTRAVENOUS
Status: DISCONTINUED | OUTPATIENT
Start: 2024-02-23 | End: 2024-02-24

## 2024-02-23 RX ORDER — 0.9 % SODIUM CHLORIDE 0.9 %
2000 INTRAVENOUS SOLUTION INTRAVENOUS ONCE
Status: COMPLETED | OUTPATIENT
Start: 2024-02-23 | End: 2024-02-24

## 2024-02-23 RX ORDER — POTASSIUM CHLORIDE 7.45 MG/ML
10 INJECTION INTRAVENOUS
Status: DISPENSED | OUTPATIENT
Start: 2024-02-24 | End: 2024-02-24

## 2024-02-23 RX ORDER — METRONIDAZOLE 500 MG/100ML
500 INJECTION, SOLUTION INTRAVENOUS EVERY 8 HOURS
Status: DISCONTINUED | OUTPATIENT
Start: 2024-02-24 | End: 2024-02-27

## 2024-02-23 RX ADMIN — VANCOMYCIN HYDROCHLORIDE 1000 MG: 1 INJECTION, POWDER, LYOPHILIZED, FOR SOLUTION INTRAVENOUS at 20:47

## 2024-02-23 RX ADMIN — CEFEPIME 2000 MG: 2 INJECTION, POWDER, FOR SOLUTION INTRAVENOUS at 20:22

## 2024-02-23 RX ADMIN — FENTANYL CITRATE 25 MCG: 50 INJECTION INTRAMUSCULAR; INTRAVENOUS at 20:21

## 2024-02-23 RX ADMIN — IOPAMIDOL 60 ML: 755 INJECTION, SOLUTION INTRAVENOUS at 21:44

## 2024-02-23 RX ADMIN — SODIUM CHLORIDE 2000 ML: 9 INJECTION, SOLUTION INTRAVENOUS at 20:22

## 2024-02-24 PROBLEM — L89.150 PRESSURE INJURY OF SACRAL REGION, UNSTAGEABLE (HCC): Status: ACTIVE | Noted: 2024-02-24

## 2024-02-24 LAB
25(OH)D3 SERPL-MCNC: 8.19 NG/ML
ALBUMIN SERPL-MCNC: 2.4 GM/DL (ref 3.4–5)
ALP BLD-CCNC: 91 IU/L (ref 40–128)
ALT SERPL-CCNC: <5 U/L (ref 10–40)
ANION GAP SERPL CALCULATED.3IONS-SCNC: 11 MMOL/L (ref 7–16)
AST SERPL-CCNC: 9 IU/L (ref 15–37)
BASOPHILS ABSOLUTE: 0 K/CU MM
BASOPHILS RELATIVE PERCENT: 0.2 % (ref 0–1)
BILIRUB SERPL-MCNC: 0.6 MG/DL (ref 0–1)
BUN SERPL-MCNC: 17 MG/DL (ref 6–23)
CALCIUM SERPL-MCNC: 7.9 MG/DL (ref 8.3–10.6)
CHLORIDE BLD-SCNC: 106 MMOL/L (ref 99–110)
CO2: 23 MMOL/L (ref 21–32)
CREAT SERPL-MCNC: 0.8 MG/DL (ref 0.6–1.1)
CRP SERPL HS-MCNC: 116.1 MG/L
DIFFERENTIAL TYPE: ABNORMAL
DOSE AMOUNT: NORMAL
DOSE TIME: NORMAL
EOSINOPHILS ABSOLUTE: 0 K/CU MM
EOSINOPHILS RELATIVE PERCENT: 0.1 % (ref 0–3)
ERYTHROCYTE SEDIMENTATION RATE: 57 MM/HR (ref 0–30)
FOLATE SERPL-MCNC: 17.6 NG/ML (ref 3.1–17.5)
GFR SERPL CREATININE-BSD FRML MDRD: >60 ML/MIN/1.73M2
GLUCOSE SERPL-MCNC: 98 MG/DL (ref 70–99)
HCT VFR BLD CALC: 29 % (ref 37–47)
HEMOGLOBIN: 8.8 GM/DL (ref 12.5–16)
IMMATURE NEUTROPHIL %: 1.4 % (ref 0–0.43)
INR BLD: 1.2 INDEX
LYMPHOCYTES ABSOLUTE: 0.8 K/CU MM
LYMPHOCYTES RELATIVE PERCENT: 4.2 % (ref 24–44)
MCH RBC QN AUTO: 29.4 PG (ref 27–31)
MCHC RBC AUTO-ENTMCNC: 30.3 % (ref 32–36)
MCV RBC AUTO: 97 FL (ref 78–100)
MONOCYTES ABSOLUTE: 0.8 K/CU MM
MONOCYTES RELATIVE PERCENT: 4.1 % (ref 0–4)
NUCLEATED RBC %: 0 %
PDW BLD-RTO: 13.1 % (ref 11.7–14.9)
PLATELET # BLD: 333 K/CU MM (ref 140–440)
PMV BLD AUTO: 9 FL (ref 7.5–11.1)
POTASSIUM SERPL-SCNC: 4 MMOL/L (ref 3.5–5.1)
PROCALCITONIN SERPL-MCNC: 0.2 NG/ML
PROTHROMBIN TIME: 15.7 SECONDS (ref 11.7–14.5)
RBC # BLD: 2.99 M/CU MM (ref 4.2–5.4)
REASON FOR REJECTION: NORMAL
REJECTED TEST: NORMAL
SEGMENTED NEUTROPHILS ABSOLUTE COUNT: 18 K/CU MM
SEGMENTED NEUTROPHILS RELATIVE PERCENT: 90 % (ref 36–66)
SODIUM BLD-SCNC: 140 MMOL/L (ref 135–145)
TOTAL IMMATURE NEUTOROPHIL: 0.27 K/CU MM
TOTAL NUCLEATED RBC: 0 K/CU MM
TOTAL PROTEIN: 4.4 GM/DL (ref 6.4–8.2)
TSH SERPL DL<=0.005 MIU/L-ACNC: 0.66 UIU/ML (ref 0.27–4.2)
VANCOMYCIN RANDOM: 9.8 UG/ML
VITAMIN B-12: 422.1 PG/ML (ref 211–911)
WBC # BLD: 19.9 K/CU MM (ref 4–10.5)

## 2024-02-24 PROCEDURE — 82607 VITAMIN B-12: CPT

## 2024-02-24 PROCEDURE — 36410 VNPNXR 3YR/> PHY/QHP DX/THER: CPT

## 2024-02-24 PROCEDURE — 2140000000 HC CCU INTERMEDIATE R&B

## 2024-02-24 PROCEDURE — 97530 THERAPEUTIC ACTIVITIES: CPT

## 2024-02-24 PROCEDURE — 87070 CULTURE OTHR SPECIMN AEROBIC: CPT

## 2024-02-24 PROCEDURE — 97163 PT EVAL HIGH COMPLEX 45 MIN: CPT

## 2024-02-24 PROCEDURE — 87075 CULTR BACTERIA EXCEPT BLOOD: CPT

## 2024-02-24 PROCEDURE — 76937 US GUIDE VASCULAR ACCESS: CPT

## 2024-02-24 PROCEDURE — 87185 SC STD ENZYME DETCJ PER NZM: CPT

## 2024-02-24 PROCEDURE — 94761 N-INVAS EAR/PLS OXIMETRY MLT: CPT

## 2024-02-24 PROCEDURE — 85652 RBC SED RATE AUTOMATED: CPT

## 2024-02-24 PROCEDURE — 2580000003 HC RX 258: Performed by: STUDENT IN AN ORGANIZED HEALTH CARE EDUCATION/TRAINING PROGRAM

## 2024-02-24 PROCEDURE — 97166 OT EVAL MOD COMPLEX 45 MIN: CPT

## 2024-02-24 PROCEDURE — 80202 ASSAY OF VANCOMYCIN: CPT

## 2024-02-24 PROCEDURE — 6360000002 HC RX W HCPCS: Performed by: STUDENT IN AN ORGANIZED HEALTH CARE EDUCATION/TRAINING PROGRAM

## 2024-02-24 PROCEDURE — 6370000000 HC RX 637 (ALT 250 FOR IP): Performed by: STUDENT IN AN ORGANIZED HEALTH CARE EDUCATION/TRAINING PROGRAM

## 2024-02-24 PROCEDURE — 86140 C-REACTIVE PROTEIN: CPT

## 2024-02-24 PROCEDURE — 05HY33Z INSERTION OF INFUSION DEVICE INTO UPPER VEIN, PERCUTANEOUS APPROACH: ICD-10-PCS | Performed by: SURGERY

## 2024-02-24 PROCEDURE — 92610 EVALUATE SWALLOWING FUNCTION: CPT

## 2024-02-24 PROCEDURE — 87077 CULTURE AEROBIC IDENTIFY: CPT

## 2024-02-24 PROCEDURE — 6370000000 HC RX 637 (ALT 250 FOR IP): Performed by: FAMILY MEDICINE

## 2024-02-24 PROCEDURE — 84145 PROCALCITONIN (PCT): CPT

## 2024-02-24 PROCEDURE — 36415 COLL VENOUS BLD VENIPUNCTURE: CPT

## 2024-02-24 PROCEDURE — 87076 CULTURE ANAEROBE IDENT EACH: CPT

## 2024-02-24 PROCEDURE — 85025 COMPLETE CBC W/AUTO DIFF WBC: CPT

## 2024-02-24 PROCEDURE — 80053 COMPREHEN METABOLIC PANEL: CPT

## 2024-02-24 PROCEDURE — 82306 VITAMIN D 25 HYDROXY: CPT

## 2024-02-24 PROCEDURE — 84443 ASSAY THYROID STIM HORMONE: CPT

## 2024-02-24 PROCEDURE — 85610 PROTHROMBIN TIME: CPT

## 2024-02-24 PROCEDURE — 82746 ASSAY OF FOLIC ACID SERUM: CPT

## 2024-02-24 RX ORDER — MORPHINE SULFATE 2 MG/ML
2 INJECTION, SOLUTION INTRAMUSCULAR; INTRAVENOUS EVERY 4 HOURS PRN
Status: COMPLETED | OUTPATIENT
Start: 2024-02-24 | End: 2024-02-28

## 2024-02-24 RX ORDER — SODIUM CHLORIDE 0.9 % (FLUSH) 0.9 %
5-40 SYRINGE (ML) INJECTION EVERY 12 HOURS SCHEDULED
Status: DISCONTINUED | OUTPATIENT
Start: 2024-02-24 | End: 2024-02-29 | Stop reason: HOSPADM

## 2024-02-24 RX ORDER — MORPHINE SULFATE 2 MG/ML
1 INJECTION, SOLUTION INTRAMUSCULAR; INTRAVENOUS ONCE
Status: COMPLETED | OUTPATIENT
Start: 2024-02-24 | End: 2024-02-24

## 2024-02-24 RX ORDER — POTASSIUM CHLORIDE 7.45 MG/ML
10 INJECTION INTRAVENOUS PRN
Status: DISCONTINUED | OUTPATIENT
Start: 2024-02-24 | End: 2024-02-29 | Stop reason: HOSPADM

## 2024-02-24 RX ORDER — SODIUM CHLORIDE, SODIUM LACTATE, POTASSIUM CHLORIDE, CALCIUM CHLORIDE 600; 310; 30; 20 MG/100ML; MG/100ML; MG/100ML; MG/100ML
INJECTION, SOLUTION INTRAVENOUS CONTINUOUS
Status: ACTIVE | OUTPATIENT
Start: 2024-02-24 | End: 2024-02-24

## 2024-02-24 RX ORDER — SODIUM CHLORIDE 9 MG/ML
INJECTION, SOLUTION INTRAVENOUS PRN
Status: DISCONTINUED | OUTPATIENT
Start: 2024-02-24 | End: 2024-02-29 | Stop reason: HOSPADM

## 2024-02-24 RX ORDER — ERGOCALCIFEROL 1.25 MG/1
50000 CAPSULE ORAL WEEKLY
Status: DISCONTINUED | OUTPATIENT
Start: 2024-02-24 | End: 2024-02-29 | Stop reason: HOSPADM

## 2024-02-24 RX ORDER — ACETAMINOPHEN 325 MG/1
650 TABLET ORAL EVERY 6 HOURS PRN
Status: DISCONTINUED | OUTPATIENT
Start: 2024-02-24 | End: 2024-02-24

## 2024-02-24 RX ORDER — TRAMADOL HYDROCHLORIDE 50 MG/1
50 TABLET ORAL EVERY 6 HOURS PRN
Status: DISCONTINUED | OUTPATIENT
Start: 2024-02-24 | End: 2024-02-29 | Stop reason: HOSPADM

## 2024-02-24 RX ORDER — 0.9 % SODIUM CHLORIDE 0.9 %
500 INTRAVENOUS SOLUTION INTRAVENOUS ONCE
Status: COMPLETED | OUTPATIENT
Start: 2024-02-24 | End: 2024-02-24

## 2024-02-24 RX ORDER — SODIUM CHLORIDE 0.9 % (FLUSH) 0.9 %
5-40 SYRINGE (ML) INJECTION PRN
Status: DISCONTINUED | OUTPATIENT
Start: 2024-02-24 | End: 2024-02-29 | Stop reason: HOSPADM

## 2024-02-24 RX ORDER — MAGNESIUM SULFATE IN WATER 40 MG/ML
2000 INJECTION, SOLUTION INTRAVENOUS PRN
Status: DISCONTINUED | OUTPATIENT
Start: 2024-02-24 | End: 2024-02-29 | Stop reason: HOSPADM

## 2024-02-24 RX ORDER — POLYETHYLENE GLYCOL 3350 17 G/17G
17 POWDER, FOR SOLUTION ORAL DAILY PRN
Status: DISCONTINUED | OUTPATIENT
Start: 2024-02-24 | End: 2024-02-29 | Stop reason: HOSPADM

## 2024-02-24 RX ORDER — ONDANSETRON 2 MG/ML
4 INJECTION INTRAMUSCULAR; INTRAVENOUS EVERY 6 HOURS PRN
Status: DISCONTINUED | OUTPATIENT
Start: 2024-02-24 | End: 2024-02-29 | Stop reason: HOSPADM

## 2024-02-24 RX ORDER — ENOXAPARIN SODIUM 100 MG/ML
30 INJECTION SUBCUTANEOUS EVERY EVENING
Status: DISCONTINUED | OUTPATIENT
Start: 2024-02-24 | End: 2024-02-24

## 2024-02-24 RX ORDER — ACETAMINOPHEN 650 MG/1
650 SUPPOSITORY RECTAL EVERY 6 HOURS PRN
Status: DISCONTINUED | OUTPATIENT
Start: 2024-02-24 | End: 2024-02-24

## 2024-02-24 RX ORDER — HEPARIN SODIUM 5000 [USP'U]/ML
5000 INJECTION, SOLUTION INTRAVENOUS; SUBCUTANEOUS 2 TIMES DAILY
Status: DISCONTINUED | OUTPATIENT
Start: 2024-02-24 | End: 2024-02-29 | Stop reason: HOSPADM

## 2024-02-24 RX ORDER — ONDANSETRON 4 MG/1
4 TABLET, ORALLY DISINTEGRATING ORAL EVERY 8 HOURS PRN
Status: DISCONTINUED | OUTPATIENT
Start: 2024-02-24 | End: 2024-02-29 | Stop reason: HOSPADM

## 2024-02-24 RX ADMIN — SODIUM CHLORIDE: 9 INJECTION, SOLUTION INTRAVENOUS at 09:00

## 2024-02-24 RX ADMIN — VANCOMYCIN HYDROCHLORIDE 750 MG: 750 INJECTION, POWDER, LYOPHILIZED, FOR SOLUTION INTRAVENOUS at 21:15

## 2024-02-24 RX ADMIN — METRONIDAZOLE 500 MG: 500 INJECTION, SOLUTION INTRAVENOUS at 11:42

## 2024-02-24 RX ADMIN — CEFEPIME 2000 MG: 2 INJECTION, POWDER, FOR SOLUTION INTRAVENOUS at 09:06

## 2024-02-24 RX ADMIN — CEFEPIME 1000 MG: 1 INJECTION, POWDER, FOR SOLUTION INTRAMUSCULAR; INTRAVENOUS at 23:08

## 2024-02-24 RX ADMIN — SODIUM CHLORIDE, PRESERVATIVE FREE 10 ML: 5 INJECTION INTRAVENOUS at 21:19

## 2024-02-24 RX ADMIN — MORPHINE SULFATE 1 MG: 2 INJECTION, SOLUTION INTRAMUSCULAR; INTRAVENOUS at 14:34

## 2024-02-24 RX ADMIN — TRAMADOL HYDROCHLORIDE 50 MG: 50 TABLET, COATED ORAL at 09:12

## 2024-02-24 RX ADMIN — METRONIDAZOLE 500 MG: 500 INJECTION, SOLUTION INTRAVENOUS at 00:40

## 2024-02-24 RX ADMIN — POTASSIUM CHLORIDE 10 MEQ: 7.46 INJECTION, SOLUTION INTRAVENOUS at 04:01

## 2024-02-24 RX ADMIN — SODIUM CHLORIDE, POTASSIUM CHLORIDE, SODIUM LACTATE AND CALCIUM CHLORIDE: 600; 310; 30; 20 INJECTION, SOLUTION INTRAVENOUS at 00:34

## 2024-02-24 RX ADMIN — METRONIDAZOLE 500 MG: 500 INJECTION, SOLUTION INTRAVENOUS at 18:20

## 2024-02-24 RX ADMIN — POTASSIUM CHLORIDE 10 MEQ: 7.46 INJECTION, SOLUTION INTRAVENOUS at 02:43

## 2024-02-24 RX ADMIN — POTASSIUM CHLORIDE 10 MEQ: 7.46 INJECTION, SOLUTION INTRAVENOUS at 00:39

## 2024-02-24 RX ADMIN — SODIUM CHLORIDE 500 ML: 9 INJECTION, SOLUTION INTRAVENOUS at 18:22

## 2024-02-24 RX ADMIN — HEPARIN SODIUM 5000 UNITS: 5000 INJECTION INTRAVENOUS; SUBCUTANEOUS at 21:19

## 2024-02-24 RX ADMIN — ERGOCALCIFEROL 50000 UNITS: 1.25 CAPSULE ORAL at 09:12

## 2024-02-24 ASSESSMENT — PAIN DESCRIPTION - DESCRIPTORS
DESCRIPTORS: SHOOTING;SHARP;STABBING
DESCRIPTORS: ACHING

## 2024-02-24 ASSESSMENT — PAIN SCALES - GENERAL
PAINLEVEL_OUTOF10: 8
PAINLEVEL_OUTOF10: 0
PAINLEVEL_OUTOF10: 10
PAINLEVEL_OUTOF10: 9
PAINLEVEL_OUTOF10: 10

## 2024-02-24 ASSESSMENT — PAIN DESCRIPTION - ORIENTATION: ORIENTATION: MID

## 2024-02-24 ASSESSMENT — PAIN DESCRIPTION - LOCATION
LOCATION: SACRUM
LOCATION: COCCYX
LOCATION: COCCYX

## 2024-02-24 ASSESSMENT — PAIN - FUNCTIONAL ASSESSMENT: PAIN_FUNCTIONAL_ASSESSMENT: PREVENTS OR INTERFERES WITH ALL ACTIVE AND SOME PASSIVE ACTIVITIES

## 2024-02-24 NOTE — H&P
MONOSABS 0.8   LYMPHSABS 1.1   EOSABS 0.0   BASOSABS 0.1     CMP:    Recent Labs     02/23/24 2026      K 3.1*      CO2 24   BUN 17   CREATININE 0.8   GLUCOSE 136*   LABALBU 3.3*   CALCIUM 9.0   BILITOT 0.9   ALKPHOS 121   AST 14*   ALT 6*     Lipids: No results found for: \"CHOL\", \"HDL\", \"TRIG\"  Hemoglobin A1C: No results found for: \"LABA1C\"  TSH: No results found for: \"TSH\"  Troponin: No results found for: \"TROPONINT\"  BNP: No results for input(s): \"PROBNP\" in the last 72 hours.  Lactic Acid: No results for input(s): \"LACTA\" in the last 72 hours.  UA:  Lab Results   Component Value Date/Time    NITRU NEGATIVE 04/30/2023 07:24 AM    COLORU YELLOW 04/30/2023 07:24 AM    WBCUA 3 04/30/2023 07:24 AM    RBCUA <1 04/30/2023 07:24 AM    MUCUS RARE 04/30/2023 07:24 AM    TRICHOMONAS NONE SEEN 04/30/2023 07:24 AM    BACTERIA NEGATIVE 04/30/2023 07:24 AM    CLARITYU CLEAR 04/30/2023 07:24 AM    SPECGRAV 1.015 04/30/2023 07:24 AM    LEUKOCYTESUR TRACE 04/30/2023 07:24 AM    UROBILINOGEN 1.0 04/30/2023 07:24 AM    BILIRUBINUR NEGATIVE 04/30/2023 07:24 AM    BLOODU NEGATIVE 04/30/2023 07:24 AM    KETUA NEGATIVE 04/30/2023 07:24 AM     Urine Cultures: No results found for: \"LABURIN\"  Blood Cultures: No results found for: \"BC\"  No results found for: \"BLOODCULT2\"  Organism: No results found for: \"ORG\"    Radiology results:  CT ABDOMEN PELVIS W IV CONTRAST Additional Contrast? None   Final Result   Decubitus ulcer with small subcutaneous abscess.      Possible osteomyelitis of the underlying coccygeal bone.      Abnormally thickened endometrium for patient's stated age.  Recommend pelvic   exam and pelvic ultrasound for further evaluation.             Fito Monzon MD  02/23/24 10:51 PM

## 2024-02-24 NOTE — ED PROVIDER NOTES
2/23/24 2216)   sodium chloride 0.9 % bolus 2,000 mL (2,000 mLs IntraVENous New Bag 2/23/24 2022)   iopamidol (ISOVUE-370) 76 % injection 60 mL (60 mLs IntraVENous Given 2/23/24 2144)       Independent Imaging Interpretation by me: CT with small gas focus overlying the sacrum with + stranding per my read    Chronic conditions affecting care: NA    Discussion with Other Profesionals : Admitting Team (hospitalist) and Consultant (general surgery)    Social Determinants : None    Records Reviewed : Inpatient Notes including most recent hospitalization of April of last year with a closed hip fracture    Disposition Considerations (tests considered but not done, Shared Decision Making, Pt Expectation of Test or Tx.):   Patient to be admitted.    Questions sought and answered with the patient. They voice understanding and agree with plan.    I am the Primary Clinician of Record.        Is this patient to be included in the SEP-1 Core Measure due to severe sepsis or septic shock?   No   Exclusion criteria - the patient is NOT to be included for SEP-1 Core Measure due to:  May have criteria for sepsis, but does not meet criteria for severe sepsis or septic shock      Clinical Impression:  1. Pressure injury of sacral region, unstageable (HCC)    2. Cellulitis of sacral region    3. Sepsis without acute organ dysfunction, due to unspecified organism (HCC)      Disposition referral (if applicable):  No follow-up provider specified.  Disposition medications (if applicable):  New Prescriptions    No medications on file       Comment: Please note this report has been produced using speech recognition software and may contain errors related to that system including errors in grammar, punctuation, and spelling, as well as words and phrases that may be inappropriate. If there are any questions or concerns please feel free to contact the dictating provider for clarification.          Atilio Hendricks MD  02/23/24 2022       Ruthie

## 2024-02-24 NOTE — CARE COORDINATION
LSW reviewed chart; noted ED CM assessment and potential needs.  LSW noted PT/OT ordered and updated therapy WB.  LSW following.  Should reheb be rec'd, ins will warrant precert.  Electronically signed by BONNY Baker on 2/24/2024 at 9:50 AM

## 2024-02-24 NOTE — ED NOTES
ED TO INPATIENT SBAR HANDOFF    Patient Name: Nasima Romero   :  1950  74 y.o.   Preferred Name  Nasima  Family/Caregiver Present no   Restraints no   C-SSRS:    Sitter no   Sepsis Risk Score Sepsis Risk Score: 1.39      Situation  Chief Complaint   Patient presents with    Wound Check     Brief Description of Patient's Condition: Patient presented for wound check. Has wound on her coccyx.   Mental Status: oriented, alert, coherent, logical, thought processes intact, and able to concentrate and follow conversation  Arrived from: home    Imaging:   CT ABDOMEN PELVIS W IV CONTRAST Additional Contrast? None   Final Result   Decubitus ulcer with small subcutaneous abscess.      Possible osteomyelitis of the underlying coccygeal bone.      Abnormally thickened endometrium for patient's stated age.  Recommend pelvic   exam and pelvic ultrasound for further evaluation.           Abnormal labs:   Abnormal Labs Reviewed   CBC WITH AUTO DIFFERENTIAL - Abnormal; Notable for the following components:       Result Value    WBC 24.3 (*)     RBC 3.79 (*)     Hemoglobin 11.2 (*)     Hematocrit 35.5 (*)     MCHC 31.5 (*)     Platelets 461 (*)     Segs Relative 90.7 (*)     Lymphocytes % 4.5 (*)     Immature Neutrophil % 1.4 (*)     All other components within normal limits   COMPREHENSIVE METABOLIC PANEL - Abnormal; Notable for the following components:    Potassium 3.1 (*)     Glucose 136 (*)     Albumin 3.3 (*)     ALT 6 (*)     AST 14 (*)     All other components within normal limits       Background  History: No past medical history on file.    Assessment    Vitals: MEWS Score: 1  Level of Consciousness: Alert (0)   Vitals:    24 2100 24 2210 24 2230 24 2300   BP: 127/61 (!) 113/94 119/62 122/61   Pulse:       Resp:       Temp:       TempSrc:       SpO2: 100% 100% 99% 99%   Weight:         PO Status:   O2 Flow Rate: O2 Device: None (Room air)    Cardiac Rhythm: sinus  Last documented pain

## 2024-02-24 NOTE — DISCHARGE INSTR - DIET
Good nutrition is important when healing from an illness, injury, or surgery.  Follow any nutrition recommendations given to you during your hospital stay.   If you were given an oral nutrition supplement while in the hospital, continue to take this supplement at home.  You can take it with meals, in-between meals, and/or before bedtime. These supplements can be purchased at most local grocery stores, pharmacies, and chain super-stores.   If you have any questions about your diet or nutrition, call the hospital and ask for the dietitian.  Due to malnutrition diagnosis, after discharge, RD recommends patient to drink high calorie, high protein oral nutrition supplements of greater than 200 calories per serving with at least or greater than 10 gm protein per serving, at least 2-3 times per day, to promote increased po intakes and weight gain. Coupons and High Calorie, High Protein Nutrition Therapy handouts provided.     Suggestions to follow at home to improve appetite:   Aim for small, frequent eating sessions. (I.e. 5-8 times per day of smaller portions)   Schedule eating times (I.e. every 2-4 hours would have a snack)   Enhance the eating environment (I.e. Eating with family and friends, watching favorite movie, or listening to favorite music with meal)   Identify problem smells taste, textures, and temperatures  Choose foods that are easy to chew and swallow   Avoid drinking fluids during eating session. Save drinks and sips between meals.     There is no need to apply all of these strategies at once.     Many patients benefit from adding 1 or 2 suggestions at a time to see how they affect their ability to eat, and then making an informed decision on how to proceed.  Please read Nutrition handout below:     High Calorie, High Protein Nutrition Therapy from Nutrition Care Manual     A high-calorie, high-protein diet has been recommended to you. Your registered dietitian nutritionist (RDN) may have recommended this

## 2024-02-24 NOTE — CARE COORDINATION
- Room # ED-26--Dr Hendricks --CM spoke with Ms Nick , bernard gupta , alert / Oriented x 3 woman who has been living in [place] for about 1 1/2 years . She comes to ER -weak with 2 falls prior and a need for attention to her wounds on her Coccyx. She previously lived in [place] and still  has Medicare from there , which may be problematic as a payor to services such as rehabilitation , HHC  and so on . She also has no PCP, so HHC is unable to assist . She had been advised previously by CM to move ahead with there changes including applying for Medicaid. None of this has been started . Medicaid application would be very helpful. .    She currently resides in a Mobile home with her Son \"Teddy\" -49 yrs special needs and her Grandson who provides transportation , food and works.She lives in a trailer with 4 steps into (one level)  .  DME equipment include a walker , a rolling walker, a cane, stand up shower.  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    Her Daughter Sarah Calderón is out of state ([place] )and would not be assistive, Pt mentions Ms Amy Gaffney-a 75 yr old relative from [facility] would be the best bet for help. . (# listed in Chart)

## 2024-02-24 NOTE — ED TRIAGE NOTES
Patient brought in by EMS for wound check. Per EMS patient had a fall recently and since then has had difficulty with ambulation, loss of appetite, and has developed a wound on her coccyx.

## 2024-02-24 NOTE — CARE COORDINATION
LSW met with pt to initiate dc planning.  Pt tearful, lying in bed requesting to return home with her disable adult son, who she is guardian for.  Pt reported her adult grandson is currently at her home helping care for her son and manage his meds.  Pt reported after falling a week ago she has been unable to do much leaving her sedentary.  Pt confirmed not having PCP and limited resources since moving back from [place] last yr to be closer to family, specifically a cousin.  Pt reported having two dtr's in [place], but has not contact with them.  Pt reported being from [place], but has been residing in [place] for approx the last 50 yrs.  LSW discussed safety concerns with pt about returning home at this time due to multiple factors: no PCP to follow for wound care orders, physical decline and overall safety concerns.  LSW high encouraged she consider SNF and pt reported she would think about it this evening.  Pt reported having [place] Medicaid.  LSW LVM for DENIS Thomas, requesting f/u with pt on Mon to work on switching [place] Medicaid over to [place] Medicaid, which LSW explained to pt this can be a lengthy process.  DC plan at this time is uncertain.  LSW to f/u with pt tomorrow.  Electronically signed by BONNY Baker on 2/24/2024 at 4:17 PM

## 2024-02-25 ENCOUNTER — ANESTHESIA EVENT (OUTPATIENT)
Dept: OPERATING ROOM | Age: 74
End: 2024-02-25
Payer: COMMERCIAL

## 2024-02-25 ENCOUNTER — ANESTHESIA (OUTPATIENT)
Dept: OPERATING ROOM | Age: 74
End: 2024-02-25
Payer: COMMERCIAL

## 2024-02-25 LAB
ANION GAP SERPL CALCULATED.3IONS-SCNC: 12 MMOL/L (ref 7–16)
BUN SERPL-MCNC: 12 MG/DL (ref 6–23)
CALCIUM SERPL-MCNC: 7.8 MG/DL (ref 8.3–10.6)
CHLORIDE BLD-SCNC: 106 MMOL/L (ref 99–110)
CO2: 20 MMOL/L (ref 21–32)
CREAT SERPL-MCNC: 0.6 MG/DL (ref 0.6–1.1)
GFR SERPL CREATININE-BSD FRML MDRD: >60 ML/MIN/1.73M2
GLUCOSE SERPL-MCNC: 111 MG/DL (ref 70–99)
POTASSIUM SERPL-SCNC: 3.6 MMOL/L (ref 3.5–5.1)
SODIUM BLD-SCNC: 138 MMOL/L (ref 135–145)

## 2024-02-25 PROCEDURE — 80048 BASIC METABOLIC PNL TOTAL CA: CPT

## 2024-02-25 PROCEDURE — 87075 CULTR BACTERIA EXCEPT BLOOD: CPT

## 2024-02-25 PROCEDURE — 87186 SC STD MICRODIL/AGAR DIL: CPT

## 2024-02-25 PROCEDURE — 87076 CULTURE ANAEROBE IDENT EACH: CPT

## 2024-02-25 PROCEDURE — 0QB10ZZ EXCISION OF SACRUM, OPEN APPROACH: ICD-10-PCS | Performed by: SURGERY

## 2024-02-25 PROCEDURE — 6360000002 HC RX W HCPCS: Performed by: STUDENT IN AN ORGANIZED HEALTH CARE EDUCATION/TRAINING PROGRAM

## 2024-02-25 PROCEDURE — 94761 N-INVAS EAR/PLS OXIMETRY MLT: CPT

## 2024-02-25 PROCEDURE — 3600000015 HC SURGERY LEVEL 5 ADDTL 15MIN: Performed by: SURGERY

## 2024-02-25 PROCEDURE — 87077 CULTURE AEROBIC IDENTIFY: CPT

## 2024-02-25 PROCEDURE — 2140000000 HC CCU INTERMEDIATE R&B

## 2024-02-25 PROCEDURE — 3700000001 HC ADD 15 MINUTES (ANESTHESIA): Performed by: SURGERY

## 2024-02-25 PROCEDURE — 0K9P0ZZ DRAINAGE OF LEFT HIP MUSCLE, OPEN APPROACH: ICD-10-PCS | Performed by: SURGERY

## 2024-02-25 PROCEDURE — 2500000003 HC RX 250 WO HCPCS

## 2024-02-25 PROCEDURE — 7100000001 HC PACU RECOVERY - ADDTL 15 MIN: Performed by: SURGERY

## 2024-02-25 PROCEDURE — 2580000003 HC RX 258: Performed by: STUDENT IN AN ORGANIZED HEALTH CARE EDUCATION/TRAINING PROGRAM

## 2024-02-25 PROCEDURE — 3700000000 HC ANESTHESIA ATTENDED CARE: Performed by: SURGERY

## 2024-02-25 PROCEDURE — 3600000005 HC SURGERY LEVEL 5 BASE: Performed by: SURGERY

## 2024-02-25 PROCEDURE — 0K9N0ZZ DRAINAGE OF RIGHT HIP MUSCLE, OPEN APPROACH: ICD-10-PCS | Performed by: SURGERY

## 2024-02-25 PROCEDURE — 36415 COLL VENOUS BLD VENIPUNCTURE: CPT

## 2024-02-25 PROCEDURE — 6370000000 HC RX 637 (ALT 250 FOR IP): Performed by: FAMILY MEDICINE

## 2024-02-25 PROCEDURE — 7100000000 HC PACU RECOVERY - FIRST 15 MIN: Performed by: SURGERY

## 2024-02-25 PROCEDURE — 87070 CULTURE OTHR SPECIMN AEROBIC: CPT

## 2024-02-25 PROCEDURE — 87205 SMEAR GRAM STAIN: CPT

## 2024-02-25 PROCEDURE — 6360000002 HC RX W HCPCS

## 2024-02-25 PROCEDURE — 2709999900 HC NON-CHARGEABLE SUPPLY: Performed by: SURGERY

## 2024-02-25 RX ORDER — LIDOCAINE HYDROCHLORIDE 20 MG/ML
INJECTION, SOLUTION INTRAVENOUS PRN
Status: DISCONTINUED | OUTPATIENT
Start: 2024-02-25 | End: 2024-02-25 | Stop reason: SDUPTHER

## 2024-02-25 RX ORDER — MORPHINE SULFATE 2 MG/ML
1 INJECTION, SOLUTION INTRAMUSCULAR; INTRAVENOUS EVERY 5 MIN PRN
Status: COMPLETED | OUTPATIENT
Start: 2024-02-25 | End: 2024-02-25

## 2024-02-25 RX ORDER — SODIUM CHLORIDE 0.9 % (FLUSH) 0.9 %
5-40 SYRINGE (ML) INJECTION EVERY 12 HOURS SCHEDULED
Status: DISCONTINUED | OUTPATIENT
Start: 2024-02-25 | End: 2024-02-25 | Stop reason: HOSPADM

## 2024-02-25 RX ORDER — ONDANSETRON 2 MG/ML
INJECTION INTRAMUSCULAR; INTRAVENOUS PRN
Status: DISCONTINUED | OUTPATIENT
Start: 2024-02-25 | End: 2024-02-25 | Stop reason: SDUPTHER

## 2024-02-25 RX ORDER — ROCURONIUM BROMIDE 10 MG/ML
INJECTION, SOLUTION INTRAVENOUS PRN
Status: DISCONTINUED | OUTPATIENT
Start: 2024-02-25 | End: 2024-02-25 | Stop reason: SDUPTHER

## 2024-02-25 RX ORDER — FENTANYL CITRATE 50 UG/ML
INJECTION, SOLUTION INTRAMUSCULAR; INTRAVENOUS PRN
Status: DISCONTINUED | OUTPATIENT
Start: 2024-02-25 | End: 2024-02-25 | Stop reason: SDUPTHER

## 2024-02-25 RX ORDER — SODIUM CHLORIDE 0.9 % (FLUSH) 0.9 %
5-40 SYRINGE (ML) INJECTION PRN
Status: DISCONTINUED | OUTPATIENT
Start: 2024-02-25 | End: 2024-02-25 | Stop reason: HOSPADM

## 2024-02-25 RX ORDER — PHENYLEPHRINE HCL IN 0.9% NACL 1 MG/10 ML
SYRINGE (ML) INTRAVENOUS PRN
Status: DISCONTINUED | OUTPATIENT
Start: 2024-02-25 | End: 2024-02-25 | Stop reason: SDUPTHER

## 2024-02-25 RX ORDER — SODIUM CHLORIDE 9 MG/ML
INJECTION, SOLUTION INTRAVENOUS PRN
Status: DISCONTINUED | OUTPATIENT
Start: 2024-02-25 | End: 2024-02-25 | Stop reason: HOSPADM

## 2024-02-25 RX ORDER — DEXAMETHASONE SODIUM PHOSPHATE 4 MG/ML
INJECTION, SOLUTION INTRA-ARTICULAR; INTRALESIONAL; INTRAMUSCULAR; INTRAVENOUS; SOFT TISSUE PRN
Status: DISCONTINUED | OUTPATIENT
Start: 2024-02-25 | End: 2024-02-25 | Stop reason: SDUPTHER

## 2024-02-25 RX ORDER — DEXAMETHASONE SODIUM PHOSPHATE 4 MG/ML
4 INJECTION, SOLUTION INTRA-ARTICULAR; INTRALESIONAL; INTRAMUSCULAR; INTRAVENOUS; SOFT TISSUE
Status: DISCONTINUED | OUTPATIENT
Start: 2024-02-25 | End: 2024-02-25 | Stop reason: HOSPADM

## 2024-02-25 RX ORDER — PROPOFOL 10 MG/ML
INJECTION, EMULSION INTRAVENOUS PRN
Status: DISCONTINUED | OUTPATIENT
Start: 2024-02-25 | End: 2024-02-25 | Stop reason: SDUPTHER

## 2024-02-25 RX ADMIN — ONDANSETRON 4 MG: 2 INJECTION INTRAMUSCULAR; INTRAVENOUS at 10:16

## 2024-02-25 RX ADMIN — METRONIDAZOLE 500 MG: 500 INJECTION, SOLUTION INTRAVENOUS at 01:13

## 2024-02-25 RX ADMIN — MORPHINE SULFATE 1 MG: 2 INJECTION, SOLUTION INTRAMUSCULAR; INTRAVENOUS at 11:12

## 2024-02-25 RX ADMIN — METRONIDAZOLE 500 MG: 500 INJECTION, SOLUTION INTRAVENOUS at 16:59

## 2024-02-25 RX ADMIN — FENTANYL CITRATE 50 MCG: 50 INJECTION, SOLUTION INTRAMUSCULAR; INTRAVENOUS at 10:10

## 2024-02-25 RX ADMIN — PROPOFOL 80 MG: 10 INJECTION, EMULSION INTRAVENOUS at 10:11

## 2024-02-25 RX ADMIN — SODIUM CHLORIDE, PRESERVATIVE FREE 10 ML: 5 INJECTION INTRAVENOUS at 20:51

## 2024-02-25 RX ADMIN — CEFEPIME 1000 MG: 1 INJECTION, POWDER, FOR SOLUTION INTRAMUSCULAR; INTRAVENOUS at 12:31

## 2024-02-25 RX ADMIN — HEPARIN SODIUM 5000 UNITS: 5000 INJECTION INTRAVENOUS; SUBCUTANEOUS at 20:42

## 2024-02-25 RX ADMIN — VANCOMYCIN HYDROCHLORIDE 750 MG: 750 INJECTION, POWDER, LYOPHILIZED, FOR SOLUTION INTRAVENOUS at 20:46

## 2024-02-25 RX ADMIN — Medication 0.1 MG: at 10:17

## 2024-02-25 RX ADMIN — DEXAMETHASONE SODIUM PHOSPHATE 4 MG: 4 INJECTION, SOLUTION INTRAMUSCULAR; INTRAVENOUS at 10:16

## 2024-02-25 RX ADMIN — METRONIDAZOLE 500 MG: 500 INJECTION, SOLUTION INTRAVENOUS at 12:33

## 2024-02-25 RX ADMIN — MORPHINE SULFATE 1 MG: 2 INJECTION, SOLUTION INTRAMUSCULAR; INTRAVENOUS at 10:55

## 2024-02-25 RX ADMIN — ROCURONIUM BROMIDE 30 MG: 10 INJECTION INTRAVENOUS at 10:11

## 2024-02-25 RX ADMIN — HEPARIN SODIUM 5000 UNITS: 5000 INJECTION INTRAVENOUS; SUBCUTANEOUS at 12:34

## 2024-02-25 RX ADMIN — LIDOCAINE HYDROCHLORIDE 40 MG: 20 INJECTION, SOLUTION INTRAVENOUS at 10:11

## 2024-02-25 RX ADMIN — TRAMADOL HYDROCHLORIDE 50 MG: 50 TABLET, COATED ORAL at 17:07

## 2024-02-25 RX ADMIN — SUGAMMADEX 200 MG: 100 INJECTION, SOLUTION INTRAVENOUS at 10:36

## 2024-02-25 RX ADMIN — SODIUM CHLORIDE, PRESERVATIVE FREE 10 ML: 5 INJECTION INTRAVENOUS at 12:33

## 2024-02-25 ASSESSMENT — PAIN DESCRIPTION - ORIENTATION
ORIENTATION: MID

## 2024-02-25 ASSESSMENT — PAIN SCALES - GENERAL
PAINLEVEL_OUTOF10: 7
PAINLEVEL_OUTOF10: 0
PAINLEVEL_OUTOF10: 0
PAINLEVEL_OUTOF10: 10
PAINLEVEL_OUTOF10: 10
PAINLEVEL_OUTOF10: 3
PAINLEVEL_OUTOF10: 0

## 2024-02-25 ASSESSMENT — PAIN - FUNCTIONAL ASSESSMENT
PAIN_FUNCTIONAL_ASSESSMENT: ACTIVITIES ARE NOT PREVENTED
PAIN_FUNCTIONAL_ASSESSMENT: PREVENTS OR INTERFERES WITH MANY ACTIVE NOT PASSIVE ACTIVITIES

## 2024-02-25 ASSESSMENT — PAIN DESCRIPTION - DESCRIPTORS
DESCRIPTORS: ACHING;DISCOMFORT
DESCRIPTORS: SHOOTING;STABBING
DESCRIPTORS: STABBING;SHOOTING

## 2024-02-25 ASSESSMENT — PAIN DESCRIPTION - LOCATION
LOCATION: SACRUM
LOCATION: COCCYX
LOCATION: COCCYX

## 2024-02-25 NOTE — CARE COORDINATION
LSW met with for f/u visit.  Pt appeared in good spirits after undergoing I&D today of sacral wound with packing; no current wound vac at this time.  LSW again reviewed PT/OT recs for SNF and discussed it's benefits, advising returning home would be unsafe with complex wound care needs and physical debility.  Pt reported she is still considering SNF, but not for sure.  LSW advised CM staff would return tomorrow to solidify dc plans.  Msg has been left for DENIS Thomas, requesting Mon f/u to help assist switching pt's [place] Medicaid to [place].  Dc plan remains unknown at this time.  Electronically signed by BONNY Baker on 2/25/2024 at 1:29 PM

## 2024-02-25 NOTE — OP NOTE
dissection.  Purulent fluid was cultured.  Wound tracked approximately 10 cm laterally and 10 cm caudally.  On the right side another abscess was incised and this also tracked to muscle/fascia and loculations were broken up with blunt dissection.  The abscess cavities were irrigated with hydroperoxide/Betadine solution.  Hemostasis over the debrided medial wound was achieved with cautery.  The wound was then packed with Betadine/hydroperoxide soaked Kerlix dressings.  ABDs were placed over this.  Patient tolerated the procedure well, without complication, and end of case was transported to the recovery area in stable condition.      Electronically signed by Guanakito Huerta DO on 2/25/2024 at 12:06 PM

## 2024-02-26 LAB
ANION GAP SERPL CALCULATED.3IONS-SCNC: 9 MMOL/L (ref 7–16)
BUN SERPL-MCNC: 14 MG/DL (ref 6–23)
CALCIUM SERPL-MCNC: 7.7 MG/DL (ref 8.3–10.6)
CHLORIDE BLD-SCNC: 107 MMOL/L (ref 99–110)
CO2: 22 MMOL/L (ref 21–32)
CREAT SERPL-MCNC: 0.7 MG/DL (ref 0.6–1.1)
DOSE AMOUNT: NORMAL
DOSE TIME: NORMAL
GFR SERPL CREATININE-BSD FRML MDRD: >60 ML/MIN/1.73M2
GLUCOSE SERPL-MCNC: 146 MG/DL (ref 70–99)
POTASSIUM SERPL-SCNC: 3.5 MMOL/L (ref 3.5–5.1)
SODIUM BLD-SCNC: 138 MMOL/L (ref 135–145)
VANCOMYCIN RANDOM: 17.3 UG/ML

## 2024-02-26 PROCEDURE — 6360000002 HC RX W HCPCS: Performed by: STUDENT IN AN ORGANIZED HEALTH CARE EDUCATION/TRAINING PROGRAM

## 2024-02-26 PROCEDURE — 2580000003 HC RX 258: Performed by: STUDENT IN AN ORGANIZED HEALTH CARE EDUCATION/TRAINING PROGRAM

## 2024-02-26 PROCEDURE — 94761 N-INVAS EAR/PLS OXIMETRY MLT: CPT

## 2024-02-26 PROCEDURE — 6370000000 HC RX 637 (ALT 250 FOR IP): Performed by: FAMILY MEDICINE

## 2024-02-26 PROCEDURE — 1200000000 HC SEMI PRIVATE

## 2024-02-26 PROCEDURE — 36415 COLL VENOUS BLD VENIPUNCTURE: CPT

## 2024-02-26 PROCEDURE — 80202 ASSAY OF VANCOMYCIN: CPT

## 2024-02-26 PROCEDURE — 97605 NEG PRS WND THER DME<=50SQCM: CPT

## 2024-02-26 PROCEDURE — 80048 BASIC METABOLIC PNL TOTAL CA: CPT

## 2024-02-26 PROCEDURE — 6370000000 HC RX 637 (ALT 250 FOR IP): Performed by: STUDENT IN AN ORGANIZED HEALTH CARE EDUCATION/TRAINING PROGRAM

## 2024-02-26 RX ADMIN — HEPARIN SODIUM 5000 UNITS: 5000 INJECTION INTRAVENOUS; SUBCUTANEOUS at 21:01

## 2024-02-26 RX ADMIN — HEPARIN SODIUM 5000 UNITS: 5000 INJECTION INTRAVENOUS; SUBCUTANEOUS at 09:35

## 2024-02-26 RX ADMIN — SODIUM CHLORIDE, PRESERVATIVE FREE 10 ML: 5 INJECTION INTRAVENOUS at 21:02

## 2024-02-26 RX ADMIN — TRAMADOL HYDROCHLORIDE 50 MG: 50 TABLET, COATED ORAL at 00:36

## 2024-02-26 RX ADMIN — SODIUM CHLORIDE 250 ML: 9 INJECTION, SOLUTION INTRAVENOUS at 21:07

## 2024-02-26 RX ADMIN — VANCOMYCIN HYDROCHLORIDE 500 MG: 500 INJECTION, POWDER, LYOPHILIZED, FOR SOLUTION INTRAVENOUS at 21:10

## 2024-02-26 RX ADMIN — CEFEPIME 2000 MG: 2 INJECTION, POWDER, FOR SOLUTION INTRAVENOUS at 00:43

## 2024-02-26 RX ADMIN — CEFEPIME 2000 MG: 2 INJECTION, POWDER, FOR SOLUTION INTRAVENOUS at 11:34

## 2024-02-26 RX ADMIN — VANCOMYCIN HYDROCHLORIDE 500 MG: 500 INJECTION, POWDER, LYOPHILIZED, FOR SOLUTION INTRAVENOUS at 11:36

## 2024-02-26 RX ADMIN — TRAMADOL HYDROCHLORIDE 50 MG: 50 TABLET, COATED ORAL at 11:30

## 2024-02-26 RX ADMIN — POTASSIUM CHLORIDE 10 MEQ: 7.46 INJECTION, SOLUTION INTRAVENOUS at 13:50

## 2024-02-26 RX ADMIN — METRONIDAZOLE 500 MG: 500 INJECTION, SOLUTION INTRAVENOUS at 00:45

## 2024-02-26 RX ADMIN — POTASSIUM CHLORIDE 10 MEQ: 7.46 INJECTION, SOLUTION INTRAVENOUS at 16:05

## 2024-02-26 RX ADMIN — TRAMADOL HYDROCHLORIDE 50 MG: 50 TABLET, COATED ORAL at 17:34

## 2024-02-26 RX ADMIN — CEFEPIME 2000 MG: 2 INJECTION, POWDER, FOR SOLUTION INTRAVENOUS at 23:59

## 2024-02-26 RX ADMIN — POTASSIUM CHLORIDE 10 MEQ: 7.46 INJECTION, SOLUTION INTRAVENOUS at 14:56

## 2024-02-26 RX ADMIN — SODIUM CHLORIDE, PRESERVATIVE FREE 10 ML: 5 INJECTION INTRAVENOUS at 12:00

## 2024-02-26 RX ADMIN — ONDANSETRON 4 MG: 4 TABLET, ORALLY DISINTEGRATING ORAL at 18:15

## 2024-02-26 RX ADMIN — POTASSIUM CHLORIDE 10 MEQ: 7.46 INJECTION, SOLUTION INTRAVENOUS at 17:34

## 2024-02-26 RX ADMIN — METRONIDAZOLE 500 MG: 500 INJECTION, SOLUTION INTRAVENOUS at 09:31

## 2024-02-26 RX ADMIN — METRONIDAZOLE 500 MG: 500 INJECTION, SOLUTION INTRAVENOUS at 17:38

## 2024-02-26 ASSESSMENT — PAIN SCALES - GENERAL
PAINLEVEL_OUTOF10: 1
PAINLEVEL_OUTOF10: 0
PAINLEVEL_OUTOF10: 5
PAINLEVEL_OUTOF10: 8
PAINLEVEL_OUTOF10: 0
PAINLEVEL_OUTOF10: 10
PAINLEVEL_OUTOF10: 9

## 2024-02-26 ASSESSMENT — PAIN - FUNCTIONAL ASSESSMENT
PAIN_FUNCTIONAL_ASSESSMENT: PREVENTS OR INTERFERES SOME ACTIVE ACTIVITIES AND ADLS
PAIN_FUNCTIONAL_ASSESSMENT: PREVENTS OR INTERFERES WITH ALL ACTIVE AND SOME PASSIVE ACTIVITIES
PAIN_FUNCTIONAL_ASSESSMENT: PREVENTS OR INTERFERES WITH ALL ACTIVE AND SOME PASSIVE ACTIVITIES

## 2024-02-26 ASSESSMENT — PAIN DESCRIPTION - DESCRIPTORS
DESCRIPTORS: STABBING;SHARP;THROBBING
DESCRIPTORS: ACHING
DESCRIPTORS: ACHING
DESCRIPTORS: SHOOTING

## 2024-02-26 ASSESSMENT — PAIN DESCRIPTION - LOCATION
LOCATION: COCCYX

## 2024-02-26 ASSESSMENT — PAIN DESCRIPTION - ORIENTATION
ORIENTATION: MID
ORIENTATION: MID

## 2024-02-26 NOTE — CARE COORDINATION
TC to provider line for pt's insurance [employer]: [phone]- they do not have any in network providers in [place]/ at this point pt's care would not be considered emergent or travel as pt relocated to [place] over 1year ago. Pt needs to call  Member Services at [phone]  to dis-enroll.

## 2024-02-27 LAB
ANION GAP SERPL CALCULATED.3IONS-SCNC: 12 MMOL/L (ref 7–16)
BASOPHILS ABSOLUTE: 0 K/CU MM
BASOPHILS RELATIVE PERCENT: 0.3 % (ref 0–1)
BUN SERPL-MCNC: 11 MG/DL (ref 6–23)
CALCIUM SERPL-MCNC: 8.2 MG/DL (ref 8.3–10.6)
CHLORIDE BLD-SCNC: 102 MMOL/L (ref 99–110)
CO2: 22 MMOL/L (ref 21–32)
CREAT SERPL-MCNC: 0.7 MG/DL (ref 0.6–1.1)
CULTURE: ABNORMAL
DIFFERENTIAL TYPE: ABNORMAL
EOSINOPHILS ABSOLUTE: 0.1 K/CU MM
EOSINOPHILS RELATIVE PERCENT: 0.5 % (ref 0–3)
GFR SERPL CREATININE-BSD FRML MDRD: >60 ML/MIN/1.73M2
GLUCOSE SERPL-MCNC: 125 MG/DL (ref 70–99)
HCT VFR BLD CALC: 33.2 % (ref 37–47)
HEMOGLOBIN: 10 GM/DL (ref 12.5–16)
IMMATURE NEUTROPHIL %: 1.5 % (ref 0–0.43)
LYMPHOCYTES ABSOLUTE: 1 K/CU MM
LYMPHOCYTES RELATIVE PERCENT: 8 % (ref 24–44)
Lab: ABNORMAL
MCH RBC QN AUTO: 29.2 PG (ref 27–31)
MCHC RBC AUTO-ENTMCNC: 30.1 % (ref 32–36)
MCV RBC AUTO: 96.8 FL (ref 78–100)
MONOCYTES ABSOLUTE: 0.6 K/CU MM
MONOCYTES RELATIVE PERCENT: 4.3 % (ref 0–4)
NUCLEATED RBC %: 0 %
PDW BLD-RTO: 13.4 % (ref 11.7–14.9)
PLATELET # BLD: 331 K/CU MM (ref 140–440)
PMV BLD AUTO: 9.2 FL (ref 7.5–11.1)
POTASSIUM SERPL-SCNC: 3.6 MMOL/L (ref 3.5–5.1)
RBC # BLD: 3.43 M/CU MM (ref 4.2–5.4)
SEGMENTED NEUTROPHILS ABSOLUTE COUNT: 11.2 K/CU MM
SEGMENTED NEUTROPHILS RELATIVE PERCENT: 85.4 % (ref 36–66)
SODIUM BLD-SCNC: 136 MMOL/L (ref 135–145)
SPECIMEN: ABNORMAL
TOTAL IMMATURE NEUTOROPHIL: 0.19 K/CU MM
TOTAL NUCLEATED RBC: 0 K/CU MM
WBC # BLD: 13.1 K/CU MM (ref 4–10.5)

## 2024-02-27 PROCEDURE — 36415 COLL VENOUS BLD VENIPUNCTURE: CPT

## 2024-02-27 PROCEDURE — 97116 GAIT TRAINING THERAPY: CPT

## 2024-02-27 PROCEDURE — 1200000000 HC SEMI PRIVATE

## 2024-02-27 PROCEDURE — 80202 ASSAY OF VANCOMYCIN: CPT

## 2024-02-27 PROCEDURE — 97530 THERAPEUTIC ACTIVITIES: CPT

## 2024-02-27 PROCEDURE — 85025 COMPLETE CBC W/AUTO DIFF WBC: CPT

## 2024-02-27 PROCEDURE — 97535 SELF CARE MNGMENT TRAINING: CPT

## 2024-02-27 PROCEDURE — 6370000000 HC RX 637 (ALT 250 FOR IP): Performed by: STUDENT IN AN ORGANIZED HEALTH CARE EDUCATION/TRAINING PROGRAM

## 2024-02-27 PROCEDURE — 93005 ELECTROCARDIOGRAM TRACING: CPT | Performed by: STUDENT IN AN ORGANIZED HEALTH CARE EDUCATION/TRAINING PROGRAM

## 2024-02-27 PROCEDURE — 94761 N-INVAS EAR/PLS OXIMETRY MLT: CPT

## 2024-02-27 PROCEDURE — 80048 BASIC METABOLIC PNL TOTAL CA: CPT

## 2024-02-27 PROCEDURE — 6360000002 HC RX W HCPCS: Performed by: STUDENT IN AN ORGANIZED HEALTH CARE EDUCATION/TRAINING PROGRAM

## 2024-02-27 PROCEDURE — 2580000003 HC RX 258: Performed by: STUDENT IN AN ORGANIZED HEALTH CARE EDUCATION/TRAINING PROGRAM

## 2024-02-27 RX ORDER — CIPROFLOXACIN 500 MG/1
500 TABLET, FILM COATED ORAL EVERY 12 HOURS SCHEDULED
Status: DISCONTINUED | OUTPATIENT
Start: 2024-02-27 | End: 2024-02-29 | Stop reason: HOSPADM

## 2024-02-27 RX ORDER — 0.9 % SODIUM CHLORIDE 0.9 %
500 INTRAVENOUS SOLUTION INTRAVENOUS ONCE
Status: COMPLETED | OUTPATIENT
Start: 2024-02-27 | End: 2024-02-27

## 2024-02-27 RX ADMIN — CIPROFLOXACIN HYDROCHLORIDE 500 MG: 500 TABLET, FILM COATED ORAL at 09:46

## 2024-02-27 RX ADMIN — METRONIDAZOLE 500 MG: 500 INJECTION, SOLUTION INTRAVENOUS at 00:04

## 2024-02-27 RX ADMIN — SODIUM CHLORIDE, PRESERVATIVE FREE 10 ML: 5 INJECTION INTRAVENOUS at 21:06

## 2024-02-27 RX ADMIN — HEPARIN SODIUM 5000 UNITS: 5000 INJECTION INTRAVENOUS; SUBCUTANEOUS at 21:06

## 2024-02-27 RX ADMIN — CIPROFLOXACIN HYDROCHLORIDE 500 MG: 500 TABLET, FILM COATED ORAL at 21:06

## 2024-02-27 RX ADMIN — SODIUM CHLORIDE 500 ML: 9 INJECTION, SOLUTION INTRAVENOUS at 15:08

## 2024-02-27 RX ADMIN — HEPARIN SODIUM 5000 UNITS: 5000 INJECTION INTRAVENOUS; SUBCUTANEOUS at 09:46

## 2024-02-27 RX ADMIN — SODIUM CHLORIDE, PRESERVATIVE FREE 10 ML: 5 INJECTION INTRAVENOUS at 09:46

## 2024-02-27 ASSESSMENT — PAIN SCALES - GENERAL: PAINLEVEL_OUTOF10: 0

## 2024-02-27 NOTE — CARE COORDINATION
CM in to see Pt to follow up on discharge planning.  Plan is SNF at this time.      Pt has [place] managed Medicare - [facility].  Pt has no in-network facilities in [place].     Pt does not have a PCP.  Home care is not an option.  [facility] will only cover 2 visits.  Pt has a woundvac.     Referral made to [facility], single case agreement in process with Pt's insurance for one time contract.    Referral made to Martha/Public benefits, Medicaid process initiated.  This will assist in placement and care moving forward.     PS to Dr. Winston to update

## 2024-02-28 LAB
ANION GAP SERPL CALCULATED.3IONS-SCNC: 8 MMOL/L (ref 7–16)
BASOPHILS ABSOLUTE: 0 K/CU MM
BASOPHILS RELATIVE PERCENT: 0.2 % (ref 0–1)
BUN SERPL-MCNC: 14 MG/DL (ref 6–23)
CALCIUM SERPL-MCNC: 7.9 MG/DL (ref 8.3–10.6)
CHLORIDE BLD-SCNC: 104 MMOL/L (ref 99–110)
CO2: 25 MMOL/L (ref 21–32)
CREAT SERPL-MCNC: 0.8 MG/DL (ref 0.6–1.1)
CULTURE: ABNORMAL
CULTURE: NORMAL
DIFFERENTIAL TYPE: ABNORMAL
EOSINOPHILS ABSOLUTE: 0.1 K/CU MM
EOSINOPHILS RELATIVE PERCENT: 0.7 % (ref 0–3)
GFR SERPL CREATININE-BSD FRML MDRD: >60 ML/MIN/1.73M2
GLUCOSE SERPL-MCNC: 127 MG/DL (ref 70–99)
HCT VFR BLD CALC: 27.1 % (ref 37–47)
HEMOGLOBIN: 8.5 GM/DL (ref 12.5–16)
IMMATURE NEUTROPHIL %: 1.5 % (ref 0–0.43)
LYMPHOCYTES ABSOLUTE: 1 K/CU MM
LYMPHOCYTES RELATIVE PERCENT: 10 % (ref 24–44)
Lab: ABNORMAL
Lab: NORMAL
MCH RBC QN AUTO: 29.4 PG (ref 27–31)
MCHC RBC AUTO-ENTMCNC: 31.4 % (ref 32–36)
MCV RBC AUTO: 93.8 FL (ref 78–100)
MONOCYTES ABSOLUTE: 0.6 K/CU MM
MONOCYTES RELATIVE PERCENT: 5.4 % (ref 0–4)
NUCLEATED RBC %: 0 %
PDW BLD-RTO: 13.4 % (ref 11.7–14.9)
PLATELET # BLD: 285 K/CU MM (ref 140–440)
PMV BLD AUTO: 9.2 FL (ref 7.5–11.1)
POTASSIUM SERPL-SCNC: 3.9 MMOL/L (ref 3.5–5.1)
RBC # BLD: 2.89 M/CU MM (ref 4.2–5.4)
SEGMENTED NEUTROPHILS ABSOLUTE COUNT: 8.5 K/CU MM
SEGMENTED NEUTROPHILS RELATIVE PERCENT: 82.2 % (ref 36–66)
SODIUM BLD-SCNC: 137 MMOL/L (ref 135–145)
SPECIMEN: ABNORMAL
SPECIMEN: NORMAL
TOTAL IMMATURE NEUTOROPHIL: 0.15 K/CU MM
TOTAL NUCLEATED RBC: 0 K/CU MM
WBC # BLD: 10.3 K/CU MM (ref 4–10.5)

## 2024-02-28 PROCEDURE — 36415 COLL VENOUS BLD VENIPUNCTURE: CPT

## 2024-02-28 PROCEDURE — 6360000002 HC RX W HCPCS: Performed by: STUDENT IN AN ORGANIZED HEALTH CARE EDUCATION/TRAINING PROGRAM

## 2024-02-28 PROCEDURE — 99221 1ST HOSP IP/OBS SF/LOW 40: CPT | Performed by: NURSE PRACTITIONER

## 2024-02-28 PROCEDURE — 1200000000 HC SEMI PRIVATE

## 2024-02-28 PROCEDURE — 97530 THERAPEUTIC ACTIVITIES: CPT

## 2024-02-28 PROCEDURE — 93005 ELECTROCARDIOGRAM TRACING: CPT | Performed by: NURSE PRACTITIONER

## 2024-02-28 PROCEDURE — 97605 NEG PRS WND THER DME<=50SQCM: CPT

## 2024-02-28 PROCEDURE — 85025 COMPLETE CBC W/AUTO DIFF WBC: CPT

## 2024-02-28 PROCEDURE — 80048 BASIC METABOLIC PNL TOTAL CA: CPT

## 2024-02-28 PROCEDURE — 6370000000 HC RX 637 (ALT 250 FOR IP): Performed by: STUDENT IN AN ORGANIZED HEALTH CARE EDUCATION/TRAINING PROGRAM

## 2024-02-28 PROCEDURE — 97116 GAIT TRAINING THERAPY: CPT

## 2024-02-28 PROCEDURE — 94761 N-INVAS EAR/PLS OXIMETRY MLT: CPT

## 2024-02-28 PROCEDURE — 2580000003 HC RX 258: Performed by: STUDENT IN AN ORGANIZED HEALTH CARE EDUCATION/TRAINING PROGRAM

## 2024-02-28 RX ORDER — MIRTAZAPINE 15 MG/1
15 TABLET, FILM COATED ORAL NIGHTLY
Status: DISCONTINUED | OUTPATIENT
Start: 2024-02-28 | End: 2024-02-29 | Stop reason: HOSPADM

## 2024-02-28 RX ORDER — HYDROXYZINE PAMOATE 25 MG/1
25 CAPSULE ORAL 2 TIMES DAILY PRN
Status: DISCONTINUED | OUTPATIENT
Start: 2024-02-28 | End: 2024-02-29 | Stop reason: HOSPADM

## 2024-02-28 RX ADMIN — CIPROFLOXACIN HYDROCHLORIDE 500 MG: 500 TABLET, FILM COATED ORAL at 09:53

## 2024-02-28 RX ADMIN — SODIUM CHLORIDE, PRESERVATIVE FREE 10 ML: 5 INJECTION INTRAVENOUS at 09:54

## 2024-02-28 RX ADMIN — MORPHINE SULFATE 2 MG: 2 INJECTION, SOLUTION INTRAMUSCULAR; INTRAVENOUS at 10:21

## 2024-02-28 RX ADMIN — MORPHINE SULFATE 2 MG: 2 INJECTION, SOLUTION INTRAMUSCULAR; INTRAVENOUS at 06:33

## 2024-02-28 ASSESSMENT — PAIN DESCRIPTION - LOCATION
LOCATION: ABDOMEN
LOCATION: CHEST
LOCATION: HIP;COCCYX

## 2024-02-28 ASSESSMENT — PAIN SCALES - GENERAL
PAINLEVEL_OUTOF10: 10
PAINLEVEL_OUTOF10: 0
PAINLEVEL_OUTOF10: 8
PAINLEVEL_OUTOF10: 8

## 2024-02-28 ASSESSMENT — PAIN - FUNCTIONAL ASSESSMENT: PAIN_FUNCTIONAL_ASSESSMENT: PREVENTS OR INTERFERES SOME ACTIVE ACTIVITIES AND ADLS

## 2024-02-28 ASSESSMENT — PAIN DESCRIPTION - DESCRIPTORS
DESCRIPTORS: DISCOMFORT
DESCRIPTORS: SHARP

## 2024-02-28 ASSESSMENT — PAIN DESCRIPTION - ORIENTATION: ORIENTATION: RIGHT;MID

## 2024-02-28 NOTE — CONSULTS
[facility] Nurse  Consult Note       Nasima Romero  AGE: 74 y.o.   GENDER: female  : 1950  TODAY'S DATE:  2024    Subjective:     Reason for  Evaluation and Assessment: wound care eval/NPWT dressing to sacrum.      Nasima Romero is a 74 y.o. female referred by:   [x] Physician  [] Nursing  [] Other:     Wound Identification:  Wound Type: pressure  Contributing Factors: chronic pressure, decreased mobility, and malnutrition        PAST MEDICAL HISTORY    No past medical history on file.    PAST SURGICAL HISTORY    Past Surgical History:   Procedure Laterality Date    ABSCESS DRAINAGE N/A 2024    INCISION AND DRAINAGE OF BILATERAL DECUBITUS WITH ABSCESS performed by Guanakito Huerta DO at [facility] OR    WRIST SURGERY         FAMILY HISTORY    No family history on file.    SOCIAL HISTORY    Social History     Tobacco Use    Smoking status: Never   Vaping Use    Vaping Use: Never used       ALLERGIES    Allergies   Allergen Reactions    Codeine     Darvon [Propoxyphene]     Tylenol [Acetaminophen]        MEDICATIONS    No current facility-administered medications on file prior to encounter.     No current outpatient medications on file prior to encounter.         Objective:      BP 93/66   Pulse 66   Temp 98 °F (36.7 °C) (Oral)   Resp 14   Ht 1.626 m (5' 4\")   Wt 39.5 kg (87 lb 1.3 oz)   SpO2 96%   BMI 14.95 kg/m²   Miguel Risk Score: Miguel Scale Score: 15    LABS    CBC:   Lab Results   Component Value Date/Time    WBC 19.9 2024 04:59 AM    RBC 2.99 2024 04:59 AM    HGB 8.8 2024 04:59 AM    HCT 29.0 2024 04:59 AM    MCV 97.0 2024 04:59 AM    MCH 29.4 2024 04:59 AM    MCHC 30.3 2024 04:59 AM    RDW 13.1 2024 04:59 AM     2024 04:59 AM    MPV 9.0 2024 04:59 AM     CMP:    Lab Results   Component Value Date/Time     2024 03:16 AM    K 3.5 2024 03:16 AM     2024 03:16 AM    CO2 22 
Comprehensive Nutrition Assessment    Type and Reason for Visit:  Initial, Positive Nutrition Screen, Consult, Wound    Nutrition Recommendations/Plan:   Please obtain updated, measured bodyweight   Continue current diet, texture/consistency per SLP  Continue standard oral nutrition supplement TID  Trial wound healing oral nutrition supplement BID  Trial fortified gelatin daily  Monitor weights, po intakes, skin, labs, POC     Malnutrition Assessment:  Malnutrition Status:  Severe malnutrition (02/24/24 1134)    Context:  Chronic Illness     Findings of the 6 clinical characteristics of malnutrition:  Energy Intake:  Mild decrease in energy intake (Comment) (x2 weeks, predicted long term poor intakes)  Weight Loss:  Unable to assess     Body Fat Loss:  Severe body fat loss Buccal region, Triceps, Orbital   Muscle Mass Loss:  Severe muscle mass loss Temples (temporalis), Clavicles (pectoralis & deltoids), Hand (interosseous)  Fluid Accumulation:  No significant fluid accumulation     Strength:  Not Performed    Nutrition Assessment:    Admitted w/ sacral wound, PMHx of anemia and underweight. Consult for wounds, pt w/ chronic sacral wound w/ plan for debridement tomorrow. Seen by SLP today, on M&M diet, pt chooses softer foods at baseline. She c/o eating nearly nothing over the past 2 weeks, states even a few bites of Jello \"went right through me\"; denies diarrhea yet today. RN states pt ate good breakfast today. Pt reports UBW around 100-105#, was surprised about CBW of 63#, ? accuracy; spoke w/ RN and student about getting re-weigh when able. Was 100# 9mo ago per chart review. Pt does have extensive food preferences, re-ordered lunch as she states dark gravy does not \"sit well with her stomach\". She does like Ensure, will keep TID, prefers vanilla. Also willing to try fortified gelatin daily and wound healing supp BID. NFPE performed, continues w/ severe wasting, meets criteria for malnutrition.    Nutrition 
Consult completed. [employer] Safety® Deep Access 20g 2 ½\" IV catheter initiated into LUE basilic vein x 1 attempt using ultrasound guided technique. Brisk blood return, flushes without resistance. Patient tolerated well. Primary nurse Sanjana notified.      Consult the Vascular Access Team for questions, concerns, or change in patient's condition.         
12:58 AM    BUN 14 02/28/2024 12:58 AM    CREATININE 0.8 02/28/2024 12:58 AM    LABGLOM >60 02/28/2024 12:58 AM    GLUCOSE 127 02/28/2024 12:58 AM    PROT 4.4 02/24/2024 04:59 AM    LABALBU 2.4 02/24/2024 04:59 AM    CALCIUM 7.9 02/28/2024 12:58 AM    BILITOT 0.6 02/24/2024 04:59 AM    ALKPHOS 91 02/24/2024 04:59 AM    AST 9 02/24/2024 04:59 AM    ALT <5 02/24/2024 04:59 AM     Albumin:    Lab Results   Component Value Date/Time    LABALBU 2.4 02/24/2024 04:59 AM     PT/INR:    Lab Results   Component Value Date/Time    PROTIME 15.7 02/24/2024 04:59 AM    INR 1.2 02/24/2024 04:59 AM     HgBA1c:  No results found for: \"LABA1C\"      Assessment:     Patient Active Problem List   Diagnosis    Fracture, hip, left, closed, initial encounter ([facility])    Severe malnutrition ([facility])    Sacral wound, initial encounter    Pressure injury of sacral region, unstageable (formerly [facility])       Measurements:  Negative Pressure Wound Therapy Sacrum (Active)   Wound Type Pressure ulcer: Stage IV 02/28/24 1051   Unit Type KCI 02/28/24 1051   Dressing Type White Foam 02/28/24 1051   Number of pieces used 3 02/28/24 1051   Cycle Continuous 02/28/24 1051   Target Pressure (mmHg) 150 02/28/24 1051   Canister changed? No 02/28/24 1051   Dressing Status New dressing applied 02/28/24 1051   Dressing Changed Changed/New 02/28/24 1051   Drainage Amount Moderate 02/28/24 1051   Drainage Description Serosanguinous 02/28/24 1051   Dressing Change Due 03/01/24 02/28/24 1051   Output (ml) 90 ml 02/28/24 0636   Kellee-wound Assessment Intact 02/28/24 1051   Odor None 02/28/24 1051   Number of days: 1       Wound 02/23/24 Sacrum (Active)   Wound Image   02/26/24 1000   Wound Etiology Pressure Stage 4 02/28/24 1051   Dressing Status New dressing applied 02/28/24 1051   Wound Cleansed Cleansed with saline 02/28/24 1051   Dressing/Treatment Negative pressure wound therapy 02/28/24 1051   Dressing Change Due 03/01/24 02/28/24 1051   Wound Length (cm) 5 cm 02/26/24 1000 
MPV 9.0 02/24/2024 04:59 AM       IMPRESSION:        Patient Active Problem List:     Fracture, hip, left, closed, initial encounter ([facility])     Severe malnutrition (HCC)     Sacral wound, initial encounter      Sacral decubitus ulcer    PLAN:    Require operative debridement.  Discussed risk benefits and alternatives with patient who agrees to procedure.  Will plan for debridement in OR tomorrow asindhu Huerta, DO      
knowledge: Average  Gait/Balance: YULIANA  Overall level of suicide risk: low risk      2/28/2024     1:26 PM   C-SSRS Suicide Screening   1) Within the past month, have you wished you were dead or wished you could go to sleep and not wake up?  Yes   2) Have you actually had any thoughts of killing yourself?  No   6) Have you ever done anything, started to do anything, or prepared to do anything to end your life? No      Impression:   Adjustment disorder  MDD single episode moderate  angel  Sacral wound, initial encounter    Plan:  Recommend starting Remeron 15 mg po at bedtime for mood management, improved sleep, increased appetite and anxiety  Recommend vistaril 25 mg bid po prn for anxiety  Patient has capacity to make her own medical decisions.  Psychiatry will follow loosely  Ty for this consult  Requesting more recent EKG for medication management   2/28/24 EKG qtc 454  Agree with ergocalciferol to replace vit d   Labs and EKG reviewed    2/28/24 hemoglobin 8.5 platelets 285, Na+ 137, BUN 14, cr 0.8,   2/24/24 alt <5, ast 9, tsh 0.663, vit b 12 422.1,  folate 17.6, vit d 8.19,   PS to Dr Winston regarding recommendations    Electronically signed by INA Garcia CNP on 2/28/2024 at 10:38 AM

## 2024-02-28 NOTE — CARE COORDINATION
CM in to see Pt to follow up on discharge planning.  Plan remains [facility]. Pt denies any needs.    4:31 PM   CM updated by [facility], Pt is pending precert at this time.     CM following

## 2024-02-29 VITALS
HEIGHT: 64 IN | OXYGEN SATURATION: 96 % | BODY MASS INDEX: 15.3 KG/M2 | SYSTOLIC BLOOD PRESSURE: 101 MMHG | HEART RATE: 128 BPM | RESPIRATION RATE: 20 BRPM | DIASTOLIC BLOOD PRESSURE: 56 MMHG | WEIGHT: 89.62 LBS | TEMPERATURE: 98.2 F

## 2024-02-29 LAB
ANION GAP SERPL CALCULATED.3IONS-SCNC: 7 MMOL/L (ref 7–16)
BASOPHILS ABSOLUTE: 0 K/CU MM
BASOPHILS RELATIVE PERCENT: 0.2 % (ref 0–1)
BUN SERPL-MCNC: 14 MG/DL (ref 6–23)
CALCIUM SERPL-MCNC: 7.6 MG/DL (ref 8.3–10.6)
CHLORIDE BLD-SCNC: 103 MMOL/L (ref 99–110)
CO2: 27 MMOL/L (ref 21–32)
CREAT SERPL-MCNC: 0.7 MG/DL (ref 0.6–1.1)
DIFFERENTIAL TYPE: ABNORMAL
EKG ATRIAL RATE: 131 BPM
EKG DIAGNOSIS: NORMAL
EKG Q-T INTERVAL: 376 MS
EKG QRS DURATION: 70 MS
EKG QTC CALCULATION (BAZETT): 561 MS
EKG R AXIS: -1 DEGREES
EKG T AXIS: 96 DEGREES
EKG VENTRICULAR RATE: 134 BPM
EOSINOPHILS ABSOLUTE: 0.1 K/CU MM
EOSINOPHILS RELATIVE PERCENT: 1 % (ref 0–3)
GFR SERPL CREATININE-BSD FRML MDRD: >60 ML/MIN/1.73M2
GLUCOSE SERPL-MCNC: 94 MG/DL (ref 70–99)
HCT VFR BLD CALC: 27.4 % (ref 37–47)
HEMOGLOBIN: 8.4 GM/DL (ref 12.5–16)
IMMATURE NEUTROPHIL %: 1.8 % (ref 0–0.43)
LYMPHOCYTES ABSOLUTE: 1.1 K/CU MM
LYMPHOCYTES RELATIVE PERCENT: 12.1 % (ref 24–44)
MCH RBC QN AUTO: 29.6 PG (ref 27–31)
MCHC RBC AUTO-ENTMCNC: 30.7 % (ref 32–36)
MCV RBC AUTO: 96.5 FL (ref 78–100)
MONOCYTES ABSOLUTE: 0.6 K/CU MM
MONOCYTES RELATIVE PERCENT: 6.5 % (ref 0–4)
NUCLEATED RBC %: 0 %
PDW BLD-RTO: 13.7 % (ref 11.7–14.9)
PLATELET # BLD: 302 K/CU MM (ref 140–440)
PMV BLD AUTO: 9.1 FL (ref 7.5–11.1)
POTASSIUM SERPL-SCNC: 4 MMOL/L (ref 3.5–5.1)
RBC # BLD: 2.84 M/CU MM (ref 4.2–5.4)
SEGMENTED NEUTROPHILS ABSOLUTE COUNT: 7.2 K/CU MM
SEGMENTED NEUTROPHILS RELATIVE PERCENT: 78.4 % (ref 36–66)
SODIUM BLD-SCNC: 137 MMOL/L (ref 135–145)
TOTAL IMMATURE NEUTOROPHIL: 0.17 K/CU MM
TOTAL NUCLEATED RBC: 0 K/CU MM
WBC # BLD: 9.2 K/CU MM (ref 4–10.5)

## 2024-02-29 PROCEDURE — 94761 N-INVAS EAR/PLS OXIMETRY MLT: CPT

## 2024-02-29 PROCEDURE — 80048 BASIC METABOLIC PNL TOTAL CA: CPT

## 2024-02-29 PROCEDURE — 36415 COLL VENOUS BLD VENIPUNCTURE: CPT

## 2024-02-29 PROCEDURE — 6370000000 HC RX 637 (ALT 250 FOR IP): Performed by: STUDENT IN AN ORGANIZED HEALTH CARE EDUCATION/TRAINING PROGRAM

## 2024-02-29 PROCEDURE — 6360000002 HC RX W HCPCS: Performed by: STUDENT IN AN ORGANIZED HEALTH CARE EDUCATION/TRAINING PROGRAM

## 2024-02-29 PROCEDURE — 2580000003 HC RX 258: Performed by: STUDENT IN AN ORGANIZED HEALTH CARE EDUCATION/TRAINING PROGRAM

## 2024-02-29 PROCEDURE — 85025 COMPLETE CBC W/AUTO DIFF WBC: CPT

## 2024-02-29 RX ORDER — ERGOCALCIFEROL 1.25 MG/1
50000 CAPSULE ORAL WEEKLY
Qty: 5 CAPSULE | Refills: 0 | Status: SHIPPED | OUTPATIENT
Start: 2024-03-02 | End: 2024-03-17

## 2024-02-29 RX ORDER — METRONIDAZOLE 250 MG/1
500 TABLET ORAL EVERY 8 HOURS SCHEDULED
Status: DISCONTINUED | OUTPATIENT
Start: 2024-02-29 | End: 2024-02-29 | Stop reason: HOSPADM

## 2024-02-29 RX ORDER — MIRTAZAPINE 15 MG/1
15 TABLET, FILM COATED ORAL NIGHTLY
Qty: 30 TABLET | Refills: 3 | Status: SHIPPED | OUTPATIENT
Start: 2024-02-29

## 2024-02-29 RX ORDER — CIPROFLOXACIN 500 MG/1
500 TABLET, FILM COATED ORAL EVERY 12 HOURS SCHEDULED
Qty: 10 TABLET | Refills: 0 | Status: SHIPPED | OUTPATIENT
Start: 2024-02-29 | End: 2024-03-05

## 2024-02-29 RX ORDER — METRONIDAZOLE 500 MG/1
500 TABLET ORAL EVERY 8 HOURS SCHEDULED
Qty: 30 TABLET | Refills: 0 | Status: SHIPPED | OUTPATIENT
Start: 2024-02-29 | End: 2024-03-10

## 2024-02-29 RX ADMIN — CIPROFLOXACIN HYDROCHLORIDE 500 MG: 500 TABLET, FILM COATED ORAL at 10:14

## 2024-02-29 RX ADMIN — SODIUM CHLORIDE, PRESERVATIVE FREE 10 ML: 5 INJECTION INTRAVENOUS at 10:18

## 2024-02-29 RX ADMIN — HEPARIN SODIUM 5000 UNITS: 5000 INJECTION INTRAVENOUS; SUBCUTANEOUS at 10:16

## 2024-02-29 NOTE — DISCHARGE INSTR - COC
Continuity of Care Form    Patient Name: Nasima Romero   :  1950  MRN:  [id number]    Admit date:  2024  Discharge date:  2024    Code Status Order: Full Code   Advance Directives:     Admitting Physician:  Fito Monzon MD  PCP: No primary care provider on file.    Discharging Nurse: Raul  Discharging Hospital Unit/Room#: 3024/3024-A  Discharging Unit Phone Number: [phone]    Emergency Contact:   Extended Emergency Contact Information  Primary Emergency Contact: Alyssa Gaffney  Mobile Phone: [phone]  Relation: Other Relative  Preferred language: English   needed? No  Secondary Emergency Contact: Sarah Davis(pt asked not to call)  Mobile Phone: [phone]  Relation: Child  Preferred language: English   needed? No    Past Surgical History:  Past Surgical History:   Procedure Laterality Date    ABSCESS DRAINAGE N/A 2024    INCISION AND DRAINAGE OF BILATERAL DECUBITUS WITH ABSCESS performed by Guanakito Huerta DO at [facility] OR    WRIST SURGERY         Immunization History:     There is no immunization history on file for this patient.    Active Problems:  Patient Active Problem List   Diagnosis Code    Fracture, hip, left, closed, initial encounter ([facility]) S72.002A    Severe malnutrition ([facility]) E43    Sacral wound, initial encounter S31.000A    Pressure injury of sacral region, unstageable ([facility]) L89.150       Isolation/Infection:   Isolation            No Isolation          Patient Infection Status       None to display            Nurse Assessment:  Last Vital Signs: /70   Pulse 76   Temp 97.6 °F (36.4 °C) (Oral)   Resp 18   Ht 1.626 m (5' 4\")   Wt 40.6 kg (89 lb 9.9 oz)   SpO2 97%   BMI 15.38 kg/m²     Last documented pain score (0-10 scale): Pain Level: 0  Last Weight:   Wt Readings from Last 1 Encounters:   24 40.6 kg (89 lb 9.9 oz)     Mental Status:  oriented and alert    IV Access:  - None    Nursing Mobility/ADLs:  Walking

## 2024-02-29 NOTE — DISCHARGE SUMMARY
BILIRUBINUR NEGATIVE 04/30/2023 07:24 AM    BLOODU NEGATIVE 04/30/2023 07:24 AM    KETUA NEGATIVE 04/30/2023 07:24 AM     Urine Cultures: No results found for: \"LABURIN\"  Blood Cultures: No results found for: \"BC\"  No results found for: \"BLOODCULT2\"  Organism: No results found for: \"ORG\"    Time Spent Discharging patient 35 minutes    Electronically signed by Justin Winston MD on 2/29/2024 at 2:33 PM

## 2024-02-29 NOTE — PLAN OF CARE
Problem: Discharge Planning  Goal: Discharge to home or other facility with appropriate resources  Outcome: Completed  Flowsheets (Taken 2/29/2024 0857)  Discharge to home or other facility with appropriate resources:   Identify barriers to discharge with patient and caregiver   Arrange for needed discharge resources and transportation as appropriate   Identify discharge learning needs (meds, wound care, etc)   Refer to discharge planning if patient needs post-hospital services based on physician order or complex needs related to functional status, cognitive ability or social support system     Problem: Pain  Goal: Verbalizes/displays adequate comfort level or baseline comfort level  Outcome: Completed  Flowsheets (Taken 2/29/2024 1000)  Verbalizes/displays adequate comfort level or baseline comfort level:   Encourage patient to monitor pain and request assistance   Assess pain using appropriate pain scale   Administer analgesics based on type and severity of pain and evaluate response     Problem: Skin/Tissue Integrity  Goal: Absence of new skin breakdown  Description: 1.  Monitor for areas of redness and/or skin breakdown  2.  Assess vascular access sites hourly  3.  Every 4-6 hours minimum:  Change oxygen saturation probe site  4.  Every 4-6 hours:  If on nasal continuous positive airway pressure, respiratory therapy assess nares and determine need for appliance change or resting period.  Outcome: Completed     Problem: Safety - Adult  Goal: Free from fall injury  Outcome: Completed  Flowsheets (Taken 2/29/2024 1012)  Free From Fall Injury: Instruct family/caregiver on patient safety     Problem: Nutrition Deficit:  Goal: Optimize nutritional status  Outcome: Completed     Problem: Cardiovascular - Adult  Goal: Maintains optimal cardiac output and hemodynamic stability  Outcome: Completed  Flowsheets (Taken 2/29/2024 0857)  Maintains optimal cardiac output and hemodynamic stability:   Monitor blood pressure and 
  Problem: Discharge Planning  Goal: Discharge to home or other facility with appropriate resources  Outcome: Progressing     Problem: Pain  Goal: Verbalizes/displays adequate comfort level or baseline comfort level  2/25/2024 0130 by Yessy Coelho RN  Outcome: Progressing  2/24/2024 1549 by Justina Gaines  Outcome: Not Progressing     Problem: Skin/Tissue Integrity  Goal: Absence of new skin breakdown  Description: 1.  Monitor for areas of redness and/or skin breakdown  2.  Assess vascular access sites hourly  3.  Every 4-6 hours minimum:  Change oxygen saturation probe site  4.  Every 4-6 hours:  If on nasal continuous positive airway pressure, respiratory therapy assess nares and determine need for appliance change or resting period.  2/25/2024 0130 by Yessy Coelho RN  Outcome: Progressing  2/24/2024 1549 by Justina Gaines  Outcome: Not Progressing     Problem: Safety - Adult  Goal: Free from fall injury  Outcome: Progressing     Problem: Nutrition Deficit:  Goal: Optimize nutritional status  Outcome: Progressing     Problem: Pain  Goal: Verbalizes/displays adequate comfort level or baseline comfort level  2/25/2024 0130 by Yessy Coelho RN  Outcome: Progressing  2/24/2024 1549 by Justina Gaines  Outcome: Not Progressing     Problem: Skin/Tissue Integrity  Goal: Absence of new skin breakdown  Description: 1.  Monitor for areas of redness and/or skin breakdown  2.  Assess vascular access sites hourly  3.  Every 4-6 hours minimum:  Change oxygen saturation probe site  4.  Every 4-6 hours:  If on nasal continuous positive airway pressure, respiratory therapy assess nares and determine need for appliance change or resting period.  2/25/2024 0130 by Yessy Coelho RN  Outcome: Progressing  2/24/2024 1549 by Justina Gaines  Outcome: Not Progressing     
  Problem: Discharge Planning  Goal: Discharge to home or other facility with appropriate resources  Outcome: Progressing     Problem: Pain  Goal: Verbalizes/displays adequate comfort level or baseline comfort level  Outcome: Progressing     Problem: Skin/Tissue Integrity  Goal: Absence of new skin breakdown  Description: 1.  Monitor for areas of redness and/or skin breakdown  2.  Assess vascular access sites hourly  3.  Every 4-6 hours minimum:  Change oxygen saturation probe site  4.  Every 4-6 hours:  If on nasal continuous positive airway pressure, respiratory therapy assess nares and determine need for appliance change or resting period.  Outcome: Progressing     Problem: Safety - Adult  Goal: Free from fall injury  Outcome: Progressing     Problem: Nutrition Deficit:  Goal: Optimize nutritional status  Outcome: Progressing     Problem: Cardiovascular - Adult  Goal: Maintains optimal cardiac output and hemodynamic stability  Outcome: Progressing  Goal: Absence of cardiac dysrhythmias or at baseline  Outcome: Progressing     Problem: Skin/Tissue Integrity - Adult  Goal: Incisions, wounds, or drain sites healing without S/S of infection  Outcome: Progressing     Problem: Musculoskeletal - Adult  Goal: Return ADL status to a safe level of function  Outcome: Progressing     Problem: Gastrointestinal - Adult  Goal: Maintains adequate nutritional intake  Outcome: Progressing     Problem: Infection - Adult  Goal: Absence of infection at discharge  Outcome: Progressing     Problem: Metabolic/Fluid and Electrolytes - Adult  Goal: Electrolytes maintained within normal limits  Outcome: Progressing  Goal: Hemodynamic stability and optimal renal function maintained  Outcome: Progressing     
  Problem: Discharge Planning  Goal: Discharge to home or other facility with appropriate resources  Outcome: Progressing     Problem: Pain  Goal: Verbalizes/displays adequate comfort level or baseline comfort level  Outcome: Progressing     Problem: Skin/Tissue Integrity  Goal: Absence of new skin breakdown  Description: 1.  Monitor for areas of redness and/or skin breakdown  2.  Assess vascular access sites hourly  3.  Every 4-6 hours minimum:  Change oxygen saturation probe site  4.  Every 4-6 hours:  If on nasal continuous positive airway pressure, respiratory therapy assess nares and determine need for appliance change or resting period.  Outcome: Progressing     Problem: Safety - Adult  Goal: Free from fall injury  Outcome: Progressing     Problem: Nutrition Deficit:  Goal: Optimize nutritional status  Outcome: Progressing  Flowsheets (Taken 2/28/2024 1237 by Hilda Durán, RD, LD)  Nutrient intake appropriate for improving, restoring, or maintaining nutritional needs:   Assess nutritional status and recommend course of action   Monitor oral intake, labs, and treatment plans   Recommend appropriate diets, oral nutritional supplements, and vitamin/mineral supplements   Provide specific nutrition education to patient or family as appropriate     Problem: Cardiovascular - Adult  Goal: Maintains optimal cardiac output and hemodynamic stability  Outcome: Progressing  Goal: Absence of cardiac dysrhythmias or at baseline  Outcome: Progressing     Problem: Skin/Tissue Integrity - Adult  Goal: Incisions, wounds, or drain sites healing without S/S of infection  Outcome: Progressing     Problem: Musculoskeletal - Adult  Goal: Return ADL status to a safe level of function  Outcome: Progressing     Problem: Gastrointestinal - Adult  Goal: Maintains adequate nutritional intake  Outcome: Progressing     Problem: Infection - Adult  Goal: Absence of infection at discharge  Outcome: Progressing     Problem: Metabolic/Fluid and 
  Problem: Metabolic/Fluid and Electrolytes - Adult  Goal: Electrolytes maintained within normal limits  Outcome: Completed  Flowsheets (Taken 2/29/2024 0857)  Electrolytes maintained within normal limits:   Monitor labs and assess patient for signs and symptoms of electrolyte imbalances   Administer electrolyte replacement as ordered  Goal: Hemodynamic stability and optimal renal function maintained  Outcome: Completed  Flowsheets (Taken 2/29/2024 0857)  Hemodynamic stability and optimal renal function maintained:   Monitor labs and assess for signs and symptoms of volume excess or deficit   Monitor intake, output and patient weight     Problem: Discharge Planning  Goal: Discharge to home or other facility with appropriate resources  Outcome: Completed  Flowsheets (Taken 2/29/2024 0857)  Discharge to home or other facility with appropriate resources:   Identify barriers to discharge with patient and caregiver   Arrange for needed discharge resources and transportation as appropriate   Identify discharge learning needs (meds, wound care, etc)   Refer to discharge planning if patient needs post-hospital services based on physician order or complex needs related to functional status, cognitive ability or social support system     Problem: Pain  Goal: Verbalizes/displays adequate comfort level or baseline comfort level  Outcome: Completed  Flowsheets (Taken 2/29/2024 1000)  Verbalizes/displays adequate comfort level or baseline comfort level:   Encourage patient to monitor pain and request assistance   Assess pain using appropriate pain scale   Administer analgesics based on type and severity of pain and evaluate response     Problem: Skin/Tissue Integrity  Goal: Absence of new skin breakdown  Description: 1.  Monitor for areas of redness and/or skin breakdown  2.  Assess vascular access sites hourly  3.  Every 4-6 hours minimum:  Change oxygen saturation probe site  4.  Every 4-6 hours:  If on nasal continuous

## 2024-02-29 NOTE — CARE COORDINATION
CM in to see Pt to follow up on discharge planning.  Plan remains [facility]. Pt denies any needs at this time.     CM call to [facility], precert remains pending.      2:49 PM   CM updated by [facility], they can accept Pt today.      PS to Dr. Winston to update, plan to discharge.     CM set stretcher transportation with [facility] for 1600    Pt GRICELDA Valle updated  Pt updated.

## 2024-02-29 NOTE — PROGRESS NOTES
Pharmacist Review and Automatic Dose Adjustment of Prophylactic Enoxaparin    Reviewed reason(s) for admission/hospital problem list    The reviewing pharmacist has made an adjustment to the ordered enoxaparin dose or converted to UFH per the approved [facility] protocol and table as identified below.        Nasima Romero is a 74 y.o. female.     Recent Labs     02/23/24 2026 02/24/24  0459   CREATININE 0.8 0.8       Estimated Creatinine Clearance: 28 mL/min (based on SCr of 0.8 mg/dL).    Recent Labs     02/23/24 2026 02/24/24  0459   HGB 11.2* 8.8*   HCT 35.5* 29.0*   * 333     Recent Labs     02/24/24  0459   INR 1.2       Height:   Ht Readings from Last 1 Encounters:   02/24/24 1.626 m (5' 4\")     Weight:  Wt Readings from Last 1 Encounters:   02/24/24 28.6 kg (63 lb 0.8 oz)               Plan: Based upon the patient's weight and renal function    Ordered: Enoxaparin 30mg SUBQ Daily    Changed/converted to    New Order: Heparin 5,000 units SUBQ BID      Thank you,  Elisha Mathias, [facility]  2/24/2024, 2:32 PM    
    V2.0  [facility] Hospitalist Progress Note      Name:  Nasima Romero /Age/Sex: 1950  (74 y.o. female)   MRN & CSN:  [id number] & [id number] Encounter Date/Time: 2024 11:31 AM EST    Location:  [address] [place] [address]-A PCP: No primary care provider on file.       Hospital Day: 6    Assessment and Plan:   Nasima Romero is a 74 y.o. female with pmh of   anemia and malnutrition who presents with Sacral wound, initial encounter    # Sepsis secondary to sacral wound with abscess s/p I&D and excisional debridement on : Endorsed fall 2 weeks prior, has been bedbound since, leading to worsening of chronic sacral wound.  Necrotic wound, afebrile, leukocytosis of 24.3, LA normal. CT showing decubitus ulcer with small subcutaneous abscess with possible osteomyelitis.  Consulted general surgery, taken to the OR and did  Incision and drainage of bilateral gluteal abscesses down to muscle/fascia.  Excisional debridement of sacral decubitus ulcer down to bone, started on on Vanc/Cefepime, and Flagyl. Follow-up cultures blood cultures no growth in 48 hours, wound cultures and surgical cultures growing E. coli, sensitive to Cipro changed to Cipro p.o.  PT/OT evaluated and recommended SNF but due to insurance issues may need home health care    # Hypokalemia: resolved     # Underweight with protein-caloric malnutrition  # Failure to thrive  - BMI 15.5, lost about 10 lbs in 10 months, Alb 3.3 (likely lower in setting of dehydration).   - Continue nutritional supplements. CM consulted for possible LTC.      Comment: Please note this report has been produced using speech recognition software and may contain errors related to that system including errors in grammar, punctuation, and spelling, as well as words and phrases that may be inappropriate. If there are any questions or concerns please feel free to contact the dictating provider for clarification.     Diet ADULT ORAL NUTRITION SUPPLEMENT; Breakfast, Lunch, Dinner; Standard High 
    V2.0  [facility] Hospitalist Progress Note      Name:  Nasima Romero /Age/Sex: 1950  (74 y.o. female)   MRN & CSN:  [id number] & [id number] Encounter Date/Time: 2024 11:31 AM EST    Location:  [facility]/3112-A PCP: No primary care provider on file.       Hospital Day: 4    Assessment and Plan:   Nasima Romero is a 74 y.o. female with pmh of   anemia and malnutrition who presents with Sacral wound, initial encounter    # Sepsis secondary to sacral wound with abscess s/p I&D and excisional debridement on : Endorsed fall 2 weeks prior, has been bedbound since, leading to worsening of chronic sacral wound.  Necrotic wound, afebrile, leukocytosis of 24.3, LA normal. CT showing decubitus ulcer with small subcutaneous abscess with possible osteomyelitis.  Consulted general surgery, taken to the OR and did  Incision and drainage of bilateral gluteal abscesses down to muscle/fascia.  Excisional debridement of sacral decubitus ulcer down to bone, started on on Vanc/Cefepime, and Flagyl. Follow-up cultures blood cultures no growth in 48 hours, wound cultures growing E. coli, surgical cultures in process continue as above.     # Hypokalemia: resolved     # Underweight with protein-caloric malnutrition  # Failure to thrive  - BMI 15.5, lost about 10 lbs in 10 months, Alb 3.3 (likely lower in setting of dehydration).   - Continue nutritional supplements. CM consulted for possible LTC.      Comment: Please note this report has been produced using speech recognition software and may contain errors related to that system including errors in grammar, punctuation, and spelling, as well as words and phrases that may be inappropriate. If there are any questions or concerns please feel free to contact the dictating provider for clarification.     Diet ADULT DIET; Dysphagia - Minced and Moist  ADULT ORAL NUTRITION SUPPLEMENT; Breakfast, Lunch, Dinner; Standard High Calorie/High Protein Oral Supplement   DVT Prophylaxis [x] 
   02/27/24 1116   Encounter Summary   Encounter Overview/Reason  Initial Encounter   Service Provided For: Patient   Referral/Consult From: [facility]   Last Encounter  02/27/24  (PT worried about being kicked out of the hospital, she shared her anxiety, she has support at home with a grandson, she doesn't want to go to a nursing home but said she would as a last resort. She trusts in God and she wanted prayer.)   Complexity of Encounter Moderate   Begin Time 1050   End Time  1118   Total Time Calculated 28 min   Spiritual/Emotional needs   Type Spiritual Support;Emotional Distress   Grief, Loss, and Adjustments   Type Adjustment to illness   Assessment/Intervention/Outcome   Assessment Anxious;Concerns with suffering;Coping;Despair;Fearful;Powerlessness;Sad;Stress overload;Tearful   Intervention Active listening;Discussed illness injury and it’s impact;Discussed relationship with God;Explored/Affirmed feelings, thoughts, concerns;Explored Coping Skills/Resources;Nurtured Hope;Prayer (assurance of)/Paradise;Sustaining Presence/Ministry of presence   Outcome Comfort;Coping;Expressed feelings, needs, and concerns;Less anxious, Less agitated;Venting emotion;Restored Hope   Plan and Referrals   Plan/Referrals Continue Support (comment)       
  Physician Progress Note      PATIENT:               FRANCISCO CANTRELL  CSN #:                  [id number]  :                       1950  ADMIT DATE:       2024 7:51 PM  DISCH DATE:  RESPONDING  PROVIDER #:        Kylie Padron MD          QUERY TEXT:    Pt admitted with sepsis and has malnutrition documented in H&P. Please further   specify type of malnutrition with documentation in the medical record.    The medical record reflects the following:  Risk Factors: Bed bound  Clinical Indicators: H&P \"Underweight with protein-caloric malnutrition\",   Dietitian note on  \"Severe malnutrition, In context of chronic illness   related to increase demand for energy/nutrients, swallowing difficulty as   evidenced by swallow study results, wounds, weight loss, severe muscle loss,   severe loss of subcutaneous fat\"  Treatment: Dietitian consult, continue Oral Nutrition Supplement.    ASPEN Criteria:    https://aspenjournals.onlinelibrary.gann.com/doi/full/10.1177/756621373203907  5    Thank you,  Pamela MARSHALLN, RN, [facility] [phone]  Options provided:  -- Severe Malnutrition  -- Other - I will add my own diagnosis  -- Disagree - Not applicable / Not valid  -- Disagree - Clinically unable to determine / Unknown  -- Refer to Clinical Documentation Reviewer    PROVIDER RESPONSE TEXT:    This patient has severe malnutrition.    Query created by: Pamela Boyd on 2024 11:57 AM      Electronically signed by:  Kylie Padron MD 2024 7:08 AM          
4 Eyes Skin Assessment     NAME:  Nasima Romero  YOB: 1950  MEDICAL RECORD NUMBER:  [id number]    The patient is being assessed for  Admission    I agree that at least one RN has performed a thorough Head to Toe Skin Assessment on the patient. ALL assessment sites listed below have been assessed.      Areas assessed by both nurses:    Head, Face, Ears, Shoulders, Back, Chest, Arms, Elbows, Hands, Sacrum. Buttock, Coccyx, Ischium, Legs. Feet and Heels, and Under Medical Devices         Does the Patient have a Wound? Yes wound(s) were present on assessment. LDA wound assessment was Initiated and completed by RN       Miguel Prevention initiated by RN: Yes  Wound Care Orders initiated by RN: No    Pressure Injury (Stage 3,4, Unstageable, DTI, NWPT, and Complex wounds) if present, place Wound referral order by RN under : Yes    New Ostomies, if present place, Ostomy referral order under : No     Nurse 1 eSignature: Electronically signed by Dora Jacobs RN on 2/24/24 at 1:34 AM EST    **SHARE this note so that the co-signing nurse can place an eSignature**    Nurse 2 eSignature: Electronically signed by Kadi Mi RN on 2/24/24 at 4:16 AM EST      
Attempted to start IV twice to no avail, pt tolerated well. Spoke with RN (Sanjana) and she is reaching out to see if a PICC line can be placed.   
Bed bathe completed,linens changed, skin was dry and scaly, lotion applied. B/L toenails are long and thick that appears to be fungal, callused areas b/l on plantar feet, Abdomen appeared to be distended and firm so bladder scan was completed and results showed approximately 337mL ; pt has not voided today, Sanjana MADISON notified Dr. Padron and stated \"will see if she voids\". Mepilex changed on sacral wound, large amount of dark brown drainage foul smelling, wound measured 8cm x 3cm x 1cm 50% of the wound had necrotic tissue and slough appears to be a stage 3 wound, cleaned with soap and water and new Mepilex applied, surgeon plans to debride in the morning, turned and repositioned on left side, complains of sacral pain 10/10 with movement or laying on it. New order to give Morphine 1mg IV push x1 as pt was resistant to Morphine, educated patient and she was accepting of 1mg. 1mg given, pt sleeping at this time, respirations even and unlabored sp02 90's. Wound culture obtained and sent per doctors order.   
Comprehensive Nutrition Assessment    Type and Reason for Visit:  Reassess    Nutrition Recommendations/Plan:   Consult SLP for diet advancement/swallow re-evaluation. Pt requests Regular.   Continue oral nutrition supplements as ordered, prefers vanilla   Encourage proper hydration/fluids intake  Will order Wound healing (Felipe) oral nutrition supplements BID      Malnutrition Assessment:  Malnutrition Status:  Severe malnutrition (02/24/24 1134)    Context:  Chronic Illness       Nutrition Assessment:    SLP rec'd minced and moist solids/thin liquids with aspiration precaution on 2/19. Currently on Easy to Chew per MD. Pt dislikes being on easy to chew, claims to not have any issues swallowing a regular pancake w/o syrup, pt requesting regular diet, recommend re-evalation by SLP for diet advancement. Pt's oral intake remains reduced but pt is drinking the supplements being sent. Pt likes vanilla/strawberry flavors. Pt has poor hydration, willing to trial Felipe and encourage to increase hydration for wound healing. Significant wounds present at this time. Will continue to monitor as high nutrition risk.    Nutrition Related Findings:    Vitamin D, labs reviewed. Sitting up in bed, feeding self. Pt appears thin, frail. Wound Type: Multiple, Pressure Injury, Stage IV, Wound Vac     C/o anxiety and chest pain. No N/V.     Intake/Output Summary (Last 24 hours) at 2/28/2024 1243  Last data filed at 2/28/2024 1045  Gross per 24 hour   Intake 490 ml   Output 865 ml   Net -375 ml     Current Nutrition Intake & Therapies:    Average Meal Intake: 51-75%  Average Supplements Intake: %  ADULT ORAL NUTRITION SUPPLEMENT; Breakfast, Lunch, Dinner; Standard High Calorie/High Protein Oral Supplement  ADULT DIET; Easy to Chew    Anthropometric Measures:  Height: 162.6 cm (5' 4\")  Ideal Body Weight (IBW): 120 lbs (55 kg)    Admission Body Weight: 28.6 kg (63 lb 0.8 oz)  Current Body Weight: 41 kg (90 lb 6.2 oz), 75.3 % IBW. 
Occupational Therapy    Occupational Therapy Treatment Note    Name: Nasima Romero MRN: [id number] :   1950   Date:  2024   Admission Date: 2024 Room:  [address] [place] [address]-A     Primary Problem:  Sacral Wound, s/p I & D    Restrictions/Precautions:  General Precautions, Fall Risk, Wound Vac, Telemetry, BP Cuff, Pulse Ox, Bed/Chair Alarm    Communication with other providers: PT GRICELDA Dia    Subjective:  Patient states:  \"How did I do?\"  Pain: Pt reported sacral wound pain but did not rate this date    Objective:    Observation: Pt supine in bed upon OT arrival. Pt pleasant and agreeable to OT treatment session after education on importance of OOB activity and discharge planning.    Objective Measures:  HR elevated in 130's at beginning of session that persisted throughout session    Treatment, including education:  Self Care Training:   Cues were given for safety, sequence, UE/LE placement, visual cues, and balance.       Therapeutic Activity Training:   Therapeutic activity training was instructed today.  Cues were given for safety, sequence, UE/LE placement, awareness, and balance.      Pt received in supine upon OT arrival. Pt pleasant and agreeable to OT treatment session after education. Pt re-educated on role of OT, POC, importance of EOB/OOB activity, negative effects of prolonged bedrest and discharge planning. Pt completed sup-sit with the HOB elevated to 30' SBA with the use of bed rails and increased time. Pt SBA in unsupported sitting EOB. Pt completed sit-stand from bed with min A and min verbal cues for safe hand placement. Pt completed functional mobility ~20 ft in room and hallway CGA with RW. Pt completed stand-sit to chair with min A for controlled descent and min verbal cues for safe hand placement. Pt completed sit-stand from chair min A with min verbal cues for safe hand placement and completed functional mobility ~20 ft to return to room. Pt completed stand-sit to chair min A for 
Occupational Therapy  Attempted to see pt at 9:30 AM and pt declining therapy at this time and states she had minimal rest last night/this morning. Will continue to follow as able and appropriate.     Beatriz HSU/MICHAEL   
Occupational Therapy  [facility] ACUTE CARE OCCUPATIONAL THERAPY EVALUATION    Nasima Romero, 1950, 3112/3112-A, 2/24/2024    Discharge Recommendation: Skilled Nursing Facility    History:  [place]:  The primary encounter diagnosis was Pressure injury of sacral region, unstageable (HCC). Diagnoses of Cellulitis of sacral region and Sepsis without acute organ dysfunction, due to unspecified organism (HCC) were also pertinent to this visit.    Subjective:  Patient states: \"It hurts so bad when I get up and move.\"  Pain: Pt reported 8/10 sacral pain at rest that increases with activity  Communication with other providers: BONNY Thomason  Restrictions: General Precautions, Fall Risk, Telemetry, BP Cuff, Pulse Ox, Bed Alarm    Home Setup/Prior level of function:  Social/Functional History  Lives With: Son (son has special needs)  Type of Home: Trailer  Home Layout: One level  Home Access: Stairs to enter with rails  Entrance Stairs - Number of Steps: 4  Entrance Stairs - Rails: Both  Bathroom Shower/Tub: Tub/Shower unit  Bathroom Toilet: Standard  Bathroom Accessibility: Accessible  Home Equipment: Walker, 4 wheeled  Has the patient had two or more falls in the past year or any fall with injury in the past year?: No  Receives Help From: Family  ADL Assistance: Independent  Homemaking Assistance: Needs assistance (pt manages most IADLs but grandson or daughter will help PRN)  Homemaking Responsibilities: Yes  Ambulation Assistance: Independent (uses no AD)  Transfer Assistance: Independent  Active : No  Occupation: Retired  Additional Comment: Social/Functional History obtained from previous admission on 4-29-23    Examination:  Observation: Supine in bed upon arrival. Agreeable to limited OT evaluation after extensive education on importance of EOB/OOB activity  Vision: WFL  Hearing: WFL  Vitals: Stable vitals throughout session on room air    Body Systems and functions:  ROM: WFL in all joints BL 
PHARMACY VANCOMYCIN MONITORING SERVICE  Pharmacy consulted by Dr. Kylie Padron MD for monitoring and adjustment.    Indication for treatment: Vancomycin indication: SSTI with risk factors   Goal trough: Trough Goal: 10-15 mcg/mL  AUC/GLENN: <500    Risk Factors for MRSA Identified:   Purulent and/or complicated SSTI    Pertinent Laboratory Values:   Temp Readings from Last 3 Encounters:   02/25/24 98.3 °F (36.8 °C) (Oral)   05/03/23 97.4 °F (36.3 °C) (Oral)     Recent Labs     02/23/24 2026 02/24/24  0459   WBC 24.3* 19.9*     Recent Labs     02/23/24 2026 02/24/24  0459   BUN 17 17   CREATININE 0.8 0.8     Estimated Creatinine Clearance: 39 mL/min (based on SCr of 0.8 mg/dL).    Intake/Output Summary (Last 24 hours) at 2/25/2024 1101  Last data filed at 2/24/2024 2315  Gross per 24 hour   Intake 330 ml   Output --   Net 330 ml     Last Encounter Weight:  Wt Readings from Last 3 Encounters:   02/25/24 39.8 kg (87 lb 11.9 oz)   04/30/23 45.4 kg (100 lb)       In: 1181.3   Out: -      Pertinent Cultures:   Date    Source    Results  02/23   Blood    NG at 48 hours  02/25   Wound    Sent    Vancomycin level:   TROUGH:  No results for input(s): \"VANCOTROUGH\" in the last 72 hours.  RANDOM:    Recent Labs     02/24/24  1621   VANCORANDOM 9.8       Assessment:  HPI: Nasima Romero is a 74 yoF with failure to thrive and underweight. Patient presented with sepsis secondary to sacral wound with abscess. Concerns for possible osteomyelitis  Interval History:   02/25 - Plan for surgical debridement today.   SCr, BUN, and urine output:   Scr 0.6 mg/L, appears to be baseline.  BUN WNL  No urine output noted  Day(s) of therapy: 3 / 7   Vancomycin concentration:   02/24 - 9.8 mg/L ~ 20 hours after loading dose of 1000 mg.    Plan:  Patient started on 750 mg IV q24 hours yesterday. Will continue, check level tomorrow AM.  Pharmacy will continue to monitor patient and adjust therapy as indicated    VANCOMYCIN CONCENTRATION SCHEDULED FOR 
PHARMACY VANCOMYCIN MONITORING SERVICE  Pharmacy consulted by Dr. Kylie Padron MD for monitoring and adjustment.    Indication for treatment: Vancomycin indication: SSTI with risk factors   Goal trough: Trough Goal: 10-15 mcg/mL  AUC/GLENN: <500    Risk Factors for MRSA Identified:   Purulent and/or complicated SSTI    Pertinent Laboratory Values:   Temp Readings from Last 3 Encounters:   02/26/24 98 °F (36.7 °C) (Oral)   05/03/23 97.4 °F (36.3 °C) (Oral)     Recent Labs     02/23/24 2026 02/24/24  0459   WBC 24.3* 19.9*       Recent Labs     02/24/24  0459 02/25/24  1454 02/26/24  0316   BUN 17 12 14   CREATININE 0.8 0.6 0.7       Estimated Creatinine Clearance: 44 mL/min (based on SCr of 0.7 mg/dL).    Intake/Output Summary (Last 24 hours) at 2/26/2024 0817  Last data filed at 2/25/2024 2055  Gross per 24 hour   Intake 728 ml   Output 15 ml   Net 713 ml       Last Encounter Weight:  Wt Readings from Last 3 Encounters:   02/26/24 39.5 kg (87 lb 1.3 oz)   04/30/23 45.4 kg (100 lb)       In: 728   Out: 15      Pertinent Cultures:   Date    Source    Results  02/23   Blood    NG at 48 hours  02/25   Wound    Sent    Vancomycin level:   TROUGH:  No results for input(s): \"VANCOTROUGH\" in the last 72 hours.  RANDOM:    Recent Labs     02/24/24  1621 02/26/24  0316   VANCORANDOM 9.8 17.3         Assessment:  HPI: Nasima Romero is a 74 yoF with failure to thrive and underweight. Patient presented with sepsis secondary to sacral wound with abscess. Concerns for possible osteomyelitis  Interval History:   02/25 - Plan for surgical debridement today.   02/26 - NAOE per notes.   SCr, BUN, and urine output:   Scr 0.7 mg/L, appears to be baseline.  BUN WNL  No urine output noted  Day(s) of therapy: 4 / 7   Vancomycin concentration:   02/24 - 9.8 mg/L ~ 20 hours after loading dose of 1000 mg.  02/26 - 17.3 mg/L ~ 6.5 hours after dose of 750 mg IV q24 hours. Predicted AUC of 384 mg/L*hr and ssTrough of 8.5 mg/L.    Plan:  With above 
PHARMACY VANCOMYCIN MONITORING SERVICE  Pharmacy consulted by Dr. Kylie Padron for monitoring and adjustment.    Indication for treatment: Vancomycin indication: SSTI with risk factors   Goal trough: Trough Goal: 10-15 mcg/mL  AUC/GLENN: 400-600    Pertinent Laboratory Values:   Temp Readings from Last 3 Encounters:   02/24/24 97.8 °F (36.6 °C) (Oral)   05/03/23 97.4 °F (36.3 °C) (Oral)     Recent Labs     02/23/24 2026 02/24/24  0459   WBC 24.3* 19.9*     Recent Labs     02/23/24 2026 02/24/24  0459   BUN 17 17   CREATININE 0.8 0.8     Estimated Creatinine Clearance: 35 mL/min (based on SCr of 0.8 mg/dL).    Intake/Output Summary (Last 24 hours) at 2/24/2024 1711  Last data filed at 2/24/2024 1434  Gross per 24 hour   Intake 1091.29 ml   Output --   Net 1091.29 ml     Last Encounter Weight:  Wt Readings from Last 3 Encounters:   02/24/24 36.4 kg (80 lb 4.8 oz)   04/30/23 45.4 kg (100 lb)       In: 1091.3   Out: -        Vancomycin level:   TROUGH:  No results for input(s): \"VANCOTROUGH\" in the last 72 hours.  RANDOM:    Recent Labs     02/24/24  1621   VANCORANDOM 9.8       74 yoF with stable renal function. Will start vancomycin 750 mg IV q24 hours, will check a level Monday.     Thank you for the consult.  Elisha Mathias RPH  2/24/2024 5:11 PM    
Patient discharged to [facility]  with [facility] to transport. Patient educated on all discharge instructions. All questions answered. Patient denies any needs at this time.     
Perfect serve to Nolan DE PAZ requesting dietician consult for malnutrition/wounds. Request for speech to eval and treat for dysphagia diet, pt currently on pureed diet.   
Physical Therapy    Physical Therapy Treatment Note  Name: Nasima Romero MRN: [id number] :   1950   Date:  2024   Admission Date: 2024 Room:  [address] [place] [address]   Restrictions/Precautions:  Restrictions/Precautions  Restrictions/Precautions: General Precautions, Fall Risk         Communication with other providers:  nurse states pt ok to treat  Subjective:  Patient states:  agreeable  Pain:   Location, Type, Intensity (0/10 to 10/10):  does not rate    Objective:    Observation:  in bed   Treatment, including education/measures:  Transfers   Rolling: Blake  Supine to sit :Blake/modA  Sit to supine :modA  Scooting :Blake/modA  Sit to stand :Blake/modA  Stand to sit :Blake  SPT:Blake /c fWW and vc for safe techs, full turns.  Sit balance EOB x~5' F balance grade.   Pt amb /c FWW x~25' /c slow bg and decreased step length/ht. Vc for safe techs. Pt very guarded and fwd flex  Safety  Patient left safely in the bed, with call light/phone in reach with alarm applied. Gait belt was used for transfers and gait.    Assessment / Impression:    Patient's tolerance of treatment:  good   Adverse Reaction: na  Significant change in status and impact:  na  Barriers to improvement:  weakness and endurance  Plan for Next Session:    Cont gait progression  Time in:  1402  Time out:  1428  Timed treatment minutes:  23  Total treatment time:  26    Previously filed items:  Social/Functional History  Lives With: Son  Type of Home: Trailer  Home Layout: One level  Home Access: Stairs to enter with rails  Entrance Stairs - Number of Steps: 4  Bathroom Shower/Tub: Tub/Shower unit  Bathroom Toilet: Standard  Home Equipment: Walker, 4 wheeled  ADL Assistance: Independent  Ambulation Assistance: Independent  Transfer Assistance: Independent  Active : No     Long Term Goals  Time Frame for Long Term Goals : In one week:  Long Term Goal 1: Pt will complete all bed mobility with SBAx1  Long Term Goal 2: Pt will complete sit <> 
RENAL DOSE ADJUSTMENT MADE PER P/T PROTOCOL    PREVIOUS ORDER:  Cefepime 1g q12hr    Estimated Creatinine Clearance: 39 mL/min (based on SCr of 0.8 mg/dL).  Recent Labs     02/23/24 2026 02/24/24  0459   BUN 17 17   CREATININE 0.8 0.8   * 333   INR  --  1.2     NEW RENALLY ADJUSTED ORDER:  Cefepime 2g q12hr    Elisha Mathias RPH  2/25/2024 1:56 PM        
RENAL DOSE ADJUSTMENT MADE PER P/T PROTOCOL    PREVIOUS ORDER:  Cefepime 2g q12hr    Estimated Creatinine Clearance: 28 mL/min (based on SCr of 0.8 mg/dL).  Recent Labs     02/23/24 2026 02/24/24  0459   BUN 17 17   CREATININE 0.8 0.8   * 333   INR  --  1.2     NEW RENALLY ADJUSTED ORDER:  Cefepime 1g q12hr    Elisha Mathias RPH  2/24/2024 2:03 PM      
Report called to GERALD Paulino at [facility].  
Speech-Language Pathology Department  Facility/Department: [address]   CLINICAL BEDSIDE SWALLOW EVALUATION    NAME: Nasima Romero  : 1950  MRN: [id number]    ADMISSION DATE: 2024  ADMITTING DIAGNOSIS: has Fracture, hip, left, closed, initial encounter (HCC); Severe malnutrition (HCC); and Sacral wound, initial encounter on their problem list.      Impressions: Nasima Romero was seen for a bedside swallowing evaluation after being admitted to [facility] with sepsis.  Pt was alert and cooperative throughout assessment.  Relevant medical hx includes hip fracture and severe malnutrition.  Pt reports she prefers softer foods at baseline.   For today's assessment pt was positioned upright in bed and presented with a clear vocal quality and weak volitional cough.  Oral mechanism examination indicated reduced labial/lingual strength with adequate coordination.  PO trials of puree, soft solids and thin liquids by cup/straw sips were given.  Mild oral dysphagia was observed characterized by prolonged mastication with timely oral A-P transit and adequate oral clearance.  Suspect mild pharyngeal dysphagia characterized by intermittently delayed swallow initiation with reduced laryngeal elevation.  Clear vocal quality and 0 overt s/s of aspiration were observed with all PO trials given.    Recommend minced and moist solids/thin liquids with aspiration precautions.  SLP will continue to follow.        ONSET DATE: this admission   Date of Eval: 2024  Evaluating Therapist: YUKO Diallo    Current Diet level:  Current Diet : Pureed  Current Liquid Diet : Thin    Primary Complaint  weakness    Pain:  Pain Assessment  Pain Assessment: 0-10  Pain Level: 8  Patient's Stated Pain Goal: 0 - No pain  Pain Location: Sacrum  Pain Descriptors: Aching  Non-Pharmaceutical Pain Intervention(s): Emotional support, Repositioned  Response to Pain Intervention: Pain improved but above pain goal  Side Effects: No reported side 
    Restrictions  Restrictions/Precautions  Restrictions/Precautions: General Precautions, Fall Risk     Subjective   General  Chart Reviewed: Yes  Patient assessed for rehabilitation services?: Yes  Family / Caregiver Present: No  Follows Commands: Within Functional Limits  Subjective  Subjective: pain: 5/10; sacrum         Social/Functional History  Social/Functional History  Lives With: Son  Type of Home: Trailer  Home Layout: One level  Home Access: Stairs to enter with rails  Entrance Stairs - Number of Steps: 4  Bathroom Shower/Tub: Tub/Shower unit  Bathroom Toilet: Standard  Home Equipment: Walker, 4 wheeled  ADL Assistance: Independent  Ambulation Assistance: Independent  Transfer Assistance: Independent  Active : No  Vision/Hearing  Vision  Vision: Within Functional Limits  Hearing  Hearing: Within functional limits    Cognition   Orientation  Overall Orientation Status: Within Functional Limits  Cognition  Overall Cognitive Status: Exceptions  Arousal/Alertness: Appropriate responses to stimuli  Following Commands: Follows all commands without difficulty  Attention Span: Appears intact  Safety Judgement: Decreased awareness of need for safety;Decreased awareness of need for assistance  Problem Solving: Decreased awareness of errors  Insights: Decreased awareness of deficits  Initiation: Requires cues for some  Sequencing: Requires cues for some     Objective   Pulse: 65  Heart Rate Source: Monitor  BP: (!) 95/45  BP Location: Left upper arm  BP Method: Automatic  Patient Position: Left side;Lying left side  MAP (Calculated): 62  Respirations: 16  SpO2: 100 %  O2 Device: None (Room air)  Temp: 97.8 °F (36.6 °C)        Gross Assessment  Sensation: Intact (BLEs)        Strength RLE  Comment: knee extension: 3+/5, ankle DF: 4-/5  Strength LLE  Comment: knee extension: 3+/5, ankle DF: 4-/5           Bed mobility  Rolling to Left: Minimal assistance (with verbal and tactile cues for BUE and BLE placement 
fair   Adverse Reaction: none  Significant change in status and impact:  none  Barriers to improvement:  Generalized Weakness and Deconditioning    Plan for Next Session:    Continue to address goals as stated in patient POC.    Time in:  1337  Time out:  1411  Timed treatment minutes:  24  Total treatment time:  34    Previously filed items:  Social/Functional History  Lives With: Son  Type of Home: Trailer  Home Layout: One level  Home Access: Stairs to enter with rails  Entrance Stairs - Number of Steps: 4  Bathroom Shower/Tub: Tub/Shower unit  Bathroom Toilet: Standard  Home Equipment: Walker, 4 wheeled  ADL Assistance: Independent  Ambulation Assistance: Independent  Transfer Assistance: Independent  Active : No     Long Term Goals  Time Frame for Long Term Goals : In one week:  Long Term Goal 1: Pt will complete all bed mobility with SBAx1  Long Term Goal 2: Pt will complete sit <> stand transfers with modAx2  Long Term Goal 3: Pt will ambulate 10 feet with modAx2 with LRAD  Long Term Goal 4: Pt will independently complete 3 sets of 10 reps of BLE AROM exercises       Electronically signed by:    Ifeoma Crowley, PT  2/27/2024, 4:06 PM    
bladder is unremarkable. Peritoneum/Retroperitoneum: No pathologically enlarged lymph nodes are identified. Bones/Soft Tissues: A decubitus ulcer is seen with subcutaneous emphysema as well as a small subcutaneous fluid collection measuring 2.2 x 1.2 cm in diameter consistent with an abscess.  There may be focal erosion of the coccyx on image number 141 of series 2.     Decubitus ulcer with small subcutaneous abscess. Possible osteomyelitis of the underlying coccygeal bone. Abnormally thickened endometrium for patient's stated age.  Recommend pelvic exam and pelvic ultrasound for further evaluation.       Electronically signed by Justin Winston MD on 2/27/2024 at 8:26 AM    
series 2.     Decubitus ulcer with small subcutaneous abscess. Possible osteomyelitis of the underlying coccygeal bone. Abnormally thickened endometrium for patient's stated age.  Recommend pelvic exam and pelvic ultrasound for further evaluation.       Electronically signed by Kylie Padron MD on 2/25/2024 at 10:58 PM    
decubitus ulcer is seen with subcutaneous emphysema as well as a small subcutaneous fluid collection measuring 2.2 x 1.2 cm in diameter consistent with an abscess.  There may be focal erosion of the coccyx on image number 141 of series 2.     Decubitus ulcer with small subcutaneous abscess. Possible osteomyelitis of the underlying coccygeal bone. Abnormally thickened endometrium for patient's stated age.  Recommend pelvic exam and pelvic ultrasound for further evaluation.       Electronically signed by Justin Winston MD on 2/29/2024 at 8:18 AM    
reformatted images are provided for review. Automated exposure control, iterative reconstruction, and/or weight based adjustment of the mA/kV was utilized to reduce the radiation dose to as low as reasonably achievable. COMPARISON: None. HISTORY: ORDERING SYSTEM PROVIDED HISTORY: deep sacral decub eval, nerotic and malodorous on exam TECHNOLOGIST PROVIDED HISTORY: Reason for exam:->deep sacral decub eval, nerotic and malodorous on exam Additional Contrast?->None Decision Support Exception - unselect if not a suspected or confirmed emergency medical condition->Emergency Medical Condition (MA) Reason for Exam: deep sacral decub eval, nerotic and malodorous on exam Additional signs and symptoms: no Relevant Medical/Surgical History: none FINDINGS: Lower Chest: The lung bases are grossly clear. Organs: There is cholelithiasis.  The liver, pancreas, and spleen are grossly within normal limits.  No adrenal masses are identified.  The kidneys are symmetric in size and enhancement.  There is no hydronephrosis and no renal stones are detected. GI/Bowel: No small bowel obstruction, free-air, or free-fluid is identified. The appendix is not identified.  The stomach and duodenum are within normal limits. Pelvis: An abnormally thickened endometrium is noted measuring up to 16 mm in thickness.  The urinary bladder is unremarkable. Peritoneum/Retroperitoneum: No pathologically enlarged lymph nodes are identified. Bones/Soft Tissues: A decubitus ulcer is seen with subcutaneous emphysema as well as a small subcutaneous fluid collection measuring 2.2 x 1.2 cm in diameter consistent with an abscess.  There may be focal erosion of the coccyx on image number 141 of series 2.     Decubitus ulcer with small subcutaneous abscess. Possible osteomyelitis of the underlying coccygeal bone. Abnormally thickened endometrium for patient's stated age.  Recommend pelvic exam and pelvic ultrasound for further evaluation.       Electronically signed

## 2024-03-01 LAB
EKG ATRIAL RATE: 76 BPM
EKG DIAGNOSIS: NORMAL
EKG P AXIS: 77 DEGREES
EKG P-R INTERVAL: 136 MS
EKG Q-T INTERVAL: 404 MS
EKG QRS DURATION: 72 MS
EKG QTC CALCULATION (BAZETT): 454 MS
EKG R AXIS: -12 DEGREES
EKG T AXIS: 23 DEGREES
EKG VENTRICULAR RATE: 76 BPM

## 2024-03-08 ENCOUNTER — HOSPITAL ENCOUNTER (INPATIENT)
Age: 74
LOS: 8 days | Discharge: HOME | End: 2024-03-16
Attending: INTERNAL MEDICINE
Payer: MEDICARE

## 2024-03-08 ENCOUNTER — APPOINTMENT (OUTPATIENT)
Dept: CT IMAGING | Age: 74
End: 2024-03-08
Attending: INTERNAL MEDICINE
Payer: MEDICARE

## 2024-03-08 DIAGNOSIS — S72.002A FRACTURE, HIP, LEFT, CLOSED, INITIAL ENCOUNTER (HCC): Primary | ICD-10-CM

## 2024-03-08 PROBLEM — N17.9 AKI (ACUTE KIDNEY INJURY) (HCC): Status: ACTIVE | Noted: 2024-03-08

## 2024-03-08 LAB
ANION GAP SERPL CALCULATED.3IONS-SCNC: 14 MMOL/L (ref 7–16)
BACTERIA: NEGATIVE /HPF
BILIRUBIN URINE: NEGATIVE MG/DL
BLOOD, URINE: ABNORMAL
BUN SERPL-MCNC: 72 MG/DL (ref 6–23)
CALCIUM SERPL-MCNC: 7 MG/DL (ref 8.3–10.6)
CHLORIDE BLD-SCNC: 106 MMOL/L (ref 99–110)
CLARITY: CLEAR
CO2: 22 MMOL/L (ref 21–32)
COLOR: YELLOW
CREAT SERPL-MCNC: 6.7 MG/DL (ref 0.6–1.1)
CREAT UR-MCNC: 35 MG/DL (ref 28–217)
GFR SERPL CREATININE-BSD FRML MDRD: 6 ML/MIN/1.73M2
GLUCOSE SERPL-MCNC: 100 MG/DL (ref 70–99)
GLUCOSE, URINE: NEGATIVE MG/DL
KETONES, URINE: NEGATIVE MG/DL
LEUKOCYTE ESTERASE, URINE: ABNORMAL
MAGNESIUM: 2 MG/DL (ref 1.8–2.4)
MUCUS: ABNORMAL HPF
NITRITE URINE, QUANTITATIVE: NEGATIVE
PH, URINE: 7 (ref 5–8)
PHOSPHORUS: 7.5 MG/DL (ref 2.5–4.9)
POTASSIUM SERPL-SCNC: 4.9 MMOL/L (ref 3.5–5.1)
PROTEIN UA: 30 MG/DL
RBC URINE: 48 /HPF (ref 0–6)
SODIUM BLD-SCNC: 142 MMOL/L (ref 135–145)
SODIUM URINE: 88 MMOL/L (ref 35–167)
SPECIFIC GRAVITY UA: 1.01 (ref 1–1.03)
SQUAMOUS EPITHELIAL: 9 /HPF
TRICHOMONAS: ABNORMAL /HPF
UROBILINOGEN, URINE: 0.2 MG/DL (ref 0.2–1)
WBC UA: 22 /HPF (ref 0–5)

## 2024-03-08 PROCEDURE — 6360000002 HC RX W HCPCS: Performed by: INTERNAL MEDICINE

## 2024-03-08 PROCEDURE — 80048 BASIC METABOLIC PNL TOTAL CA: CPT

## 2024-03-08 PROCEDURE — 84100 ASSAY OF PHOSPHORUS: CPT

## 2024-03-08 PROCEDURE — 74176 CT ABD & PELVIS W/O CONTRAST: CPT

## 2024-03-08 PROCEDURE — 84156 ASSAY OF PROTEIN URINE: CPT

## 2024-03-08 PROCEDURE — 2580000003 HC RX 258: Performed by: INTERNAL MEDICINE

## 2024-03-08 PROCEDURE — 83935 ASSAY OF URINE OSMOLALITY: CPT

## 2024-03-08 PROCEDURE — 81001 URINALYSIS AUTO W/SCOPE: CPT

## 2024-03-08 PROCEDURE — 84540 ASSAY OF URINE/UREA-N: CPT

## 2024-03-08 PROCEDURE — 84300 ASSAY OF URINE SODIUM: CPT

## 2024-03-08 PROCEDURE — 87086 URINE CULTURE/COLONY COUNT: CPT

## 2024-03-08 PROCEDURE — 86255 FLUORESCENT ANTIBODY SCREEN: CPT

## 2024-03-08 PROCEDURE — 82570 ASSAY OF URINE CREATININE: CPT

## 2024-03-08 PROCEDURE — 84165 PROTEIN E-PHORESIS SERUM: CPT

## 2024-03-08 PROCEDURE — 36415 COLL VENOUS BLD VENIPUNCTURE: CPT

## 2024-03-08 PROCEDURE — 83735 ASSAY OF MAGNESIUM: CPT

## 2024-03-08 PROCEDURE — 84155 ASSAY OF PROTEIN SERUM: CPT

## 2024-03-08 PROCEDURE — 1200000000 HC SEMI PRIVATE

## 2024-03-08 RX ORDER — SODIUM CHLORIDE 0.9 % (FLUSH) 0.9 %
5-40 SYRINGE (ML) INJECTION PRN
Status: DISCONTINUED | OUTPATIENT
Start: 2024-03-08 | End: 2024-03-16 | Stop reason: HOSPADM

## 2024-03-08 RX ORDER — ONDANSETRON 2 MG/ML
4 INJECTION INTRAMUSCULAR; INTRAVENOUS EVERY 6 HOURS PRN
Status: DISCONTINUED | OUTPATIENT
Start: 2024-03-08 | End: 2024-03-16 | Stop reason: HOSPADM

## 2024-03-08 RX ORDER — POTASSIUM CHLORIDE 20 MEQ/1
40 TABLET, EXTENDED RELEASE ORAL PRN
Status: DISCONTINUED | OUTPATIENT
Start: 2024-03-08 | End: 2024-03-16 | Stop reason: HOSPADM

## 2024-03-08 RX ORDER — SODIUM CHLORIDE 0.9 % (FLUSH) 0.9 %
5-40 SYRINGE (ML) INJECTION EVERY 12 HOURS SCHEDULED
Status: DISCONTINUED | OUTPATIENT
Start: 2024-03-08 | End: 2024-03-16 | Stop reason: HOSPADM

## 2024-03-08 RX ORDER — POTASSIUM CHLORIDE 7.45 MG/ML
10 INJECTION INTRAVENOUS PRN
Status: DISCONTINUED | OUTPATIENT
Start: 2024-03-08 | End: 2024-03-16 | Stop reason: HOSPADM

## 2024-03-08 RX ORDER — POLYETHYLENE GLYCOL 3350 17 G/17G
17 POWDER, FOR SOLUTION ORAL DAILY PRN
Status: DISCONTINUED | OUTPATIENT
Start: 2024-03-08 | End: 2024-03-09

## 2024-03-08 RX ORDER — HEPARIN SODIUM 5000 [USP'U]/ML
5000 INJECTION, SOLUTION INTRAVENOUS; SUBCUTANEOUS 2 TIMES DAILY
Status: DISCONTINUED | OUTPATIENT
Start: 2024-03-08 | End: 2024-03-16 | Stop reason: HOSPADM

## 2024-03-08 RX ORDER — MAGNESIUM SULFATE IN WATER 40 MG/ML
2000 INJECTION, SOLUTION INTRAVENOUS PRN
Status: DISCONTINUED | OUTPATIENT
Start: 2024-03-08 | End: 2024-03-16 | Stop reason: HOSPADM

## 2024-03-08 RX ORDER — ACETAMINOPHEN 650 MG/1
650 SUPPOSITORY RECTAL EVERY 6 HOURS PRN
Status: DISCONTINUED | OUTPATIENT
Start: 2024-03-08 | End: 2024-03-16 | Stop reason: HOSPADM

## 2024-03-08 RX ORDER — ENOXAPARIN SODIUM 100 MG/ML
40 INJECTION SUBCUTANEOUS DAILY
Status: DISCONTINUED | OUTPATIENT
Start: 2024-03-09 | End: 2024-03-08

## 2024-03-08 RX ORDER — ONDANSETRON 4 MG/1
4 TABLET, ORALLY DISINTEGRATING ORAL EVERY 8 HOURS PRN
Status: DISCONTINUED | OUTPATIENT
Start: 2024-03-08 | End: 2024-03-16 | Stop reason: HOSPADM

## 2024-03-08 RX ORDER — SODIUM CHLORIDE 9 MG/ML
INJECTION, SOLUTION INTRAVENOUS CONTINUOUS
Status: DISCONTINUED | OUTPATIENT
Start: 2024-03-08 | End: 2024-03-09

## 2024-03-08 RX ORDER — ACETAMINOPHEN 325 MG/1
650 TABLET ORAL EVERY 6 HOURS PRN
Status: DISCONTINUED | OUTPATIENT
Start: 2024-03-08 | End: 2024-03-16 | Stop reason: HOSPADM

## 2024-03-08 RX ORDER — SODIUM CHLORIDE 9 MG/ML
INJECTION, SOLUTION INTRAVENOUS PRN
Status: DISCONTINUED | OUTPATIENT
Start: 2024-03-08 | End: 2024-03-16 | Stop reason: HOSPADM

## 2024-03-08 RX ADMIN — HEPARIN SODIUM 5000 UNITS: 5000 INJECTION INTRAVENOUS; SUBCUTANEOUS at 22:15

## 2024-03-08 RX ADMIN — SODIUM CHLORIDE, PRESERVATIVE FREE 10 ML: 5 INJECTION INTRAVENOUS at 22:15

## 2024-03-08 RX ADMIN — SODIUM CHLORIDE: 9 INJECTION, SOLUTION INTRAVENOUS at 22:30

## 2024-03-08 NOTE — H&P
V2.0  History and Physical      Name:  Nasima Romero /Age/Sex: 1950  (74 y.o. female)   MRN & CSN:  [id number] & [id number] Encounter Date/Time: 3/8/2024 4:20 PM EST   [facility] PCP: No primary care provider on file.       Hospital Day: 1    Assessment and Plan:   Nasima Romero is a 74 y.o. female with a pmh of anemia, malnutrition, infected decubitus ulcer, failure to thrive, who presents with CAROL ANN (acute kidney injury) ([facility])    Hospital Problems             Last Modified POA    * (Principal) CAROL ANN (acute kidney injury) ([facility]) 3/8/2024 Yes       CAROL ANN  Baseline sCr ~ 0.7-0.8  BUN/Cr 73/6.3 on presentation  -Monitor intake/output; sexton catheter was placed in the ED - unclear if she had any significant retention  -Check urine sodium and creatinine to assess FENa  -CT abdomen pelvis without contrast for now to assess for the abdominal distention and the renal system  -IVF rehydration with NS at 50 cc/hr for now  -Consult nephrology     Abdominal distension  -unclear etiology  -no complaints of pain  -passing gas and BM  -obtain CT abdomen wo contrast    Sacral decubitus ulcer  -bed bound since fall in mid February causing worsening of the chronic sacral wound, on last admission, the wound was found to be necrotic, CT had shown potential abscess and osteomyelitis, underwent I&D down to muscle/fascia/bone, was started on vancomycin and cefepime, surgical culture grew ecoli, discharged on cipro and metronidazole   -wound care consult  -surgical consult if concern from wound care team    Malnutrition  Failure to thrive  -nutritional supplement  -dietary consult  -difficult to get patient to understand appetite stimulant; discuss again and start    Disposition:     Diet ADULT DIET; Regular   DVT Prophylaxis [] Lovenox, []  Heparin, [] SCDs, [] Ambulation,  [] Eliquis, [] Xarelto  [] Coumadin   Peptic ulcer prophylaxis -   Code Status Full Code   Disposition From / Current living situation : NH  Expected

## 2024-03-08 NOTE — PROGRESS NOTES
4 Eyes Skin Assessment     NAME:  Nasima Romero  YOB: 1950  MEDICAL RECORD NUMBER:  [id number]    The patient is being assessed for  Admission    I agree that at least one RN has performed a thorough Head to Toe Skin Assessment on the patient. ALL assessment sites listed below have been assessed.      Areas assessed by both nurses:    Head, Face, Ears, Shoulders, Back, Chest, Arms, Elbows, Hands, Sacrum. Buttock, Coccyx, Ischium, Legs. Feet and Heels, and Under Medical Devices         Does the Patient have a Wound? Yes wound(s) were present on assessment. LDA wound assessment was Initiated and completed by RN       Miguel Prevention initiated by RN: Yes  Wound Care Orders initiated by RN: Yes    Pressure Injury (Stage 3,4, Unstageable, DTI, NWPT, and Complex wounds) if present, place Wound referral order by RN under : Yes    New Ostomies, if present place, Ostomy referral order under : No     Nurse 1 eSignature: Electronically signed by Daisy Rivera RN on 3/8/24 at 6:45 PM EST    **SHARE this note so that the co-signing nurse can place an eSignature**    Nurse 2 eSignature: Electronically signed by Hetal Hess RN on 3/8/24 at 7:21 PM EST

## 2024-03-09 PROBLEM — N17.9 ACUTE KIDNEY INJURY (HCC): Status: ACTIVE | Noted: 2024-03-09

## 2024-03-09 LAB
ALBUMIN SERPL-MCNC: 2.4 GM/DL (ref 3.4–5)
ALP BLD-CCNC: 64 IU/L (ref 40–129)
ALT SERPL-CCNC: 7 U/L (ref 10–40)
ANION GAP SERPL CALCULATED.3IONS-SCNC: 15 MMOL/L (ref 7–16)
AST SERPL-CCNC: 15 IU/L (ref 15–37)
BASOPHILS ABSOLUTE: 0 K/CU MM
BASOPHILS RELATIVE PERCENT: 0.5 % (ref 0–1)
BILIRUB SERPL-MCNC: 0.3 MG/DL (ref 0–1)
BILIRUBIN DIRECT: 0.2 MG/DL (ref 0–0.3)
BILIRUBIN, INDIRECT: 0.1 MG/DL (ref 0–0.7)
BUN SERPL-MCNC: 72 MG/DL (ref 6–23)
CALCIUM SERPL-MCNC: 6.6 MG/DL (ref 8.3–10.6)
CHLORIDE BLD-SCNC: 114 MMOL/L (ref 99–110)
CO2: 19 MMOL/L (ref 21–32)
CREAT SERPL-MCNC: 6.9 MG/DL (ref 0.6–1.1)
DIFFERENTIAL TYPE: ABNORMAL
EOSINOPHILS ABSOLUTE: 0.3 K/CU MM
EOSINOPHILS RELATIVE PERCENT: 3.3 % (ref 0–3)
GFR SERPL CREATININE-BSD FRML MDRD: 6 ML/MIN/1.73M2
GLUCOSE SERPL-MCNC: 92 MG/DL (ref 70–99)
HBV SURFACE AB SERPL IA-ACNC: <3.5 M[IU]/ML
HBV SURFACE AG SERPL QL IA: NON REACTIVE
HCT VFR BLD CALC: 24.2 % (ref 37–47)
HEMOGLOBIN: 7.4 GM/DL (ref 12.5–16)
HIV 1+2 AB+HIV1P24 AG SERPLBLD IA.RAPID: NON REACTIVE
IMMATURE NEUTROPHIL %: 1.1 % (ref 0–0.43)
LYMPHOCYTES ABSOLUTE: 0.8 K/CU MM
LYMPHOCYTES RELATIVE PERCENT: 9.6 % (ref 24–44)
MAGNESIUM: 2 MG/DL (ref 1.8–2.4)
MCH RBC QN AUTO: 29.4 PG (ref 27–31)
MCHC RBC AUTO-ENTMCNC: 30.6 % (ref 32–36)
MCV RBC AUTO: 96 FL (ref 78–100)
MONOCYTES ABSOLUTE: 0.6 K/CU MM
MONOCYTES RELATIVE PERCENT: 6.6 % (ref 0–4)
NUCLEATED RBC %: 0 %
OSMOLALITY UR: 315 MOS/L (ref 292–1090)
PDW BLD-RTO: 16.3 % (ref 11.7–14.9)
PHOSPHORUS: 7.8 MG/DL (ref 2.5–4.9)
PLATELET # BLD: 230 K/CU MM (ref 140–440)
PMV BLD AUTO: 9.3 FL (ref 7.5–11.1)
POTASSIUM SERPL-SCNC: 5.1 MMOL/L (ref 3.5–5.1)
RBC # BLD: 2.52 M/CU MM (ref 4.2–5.4)
SEGMENTED NEUTROPHILS ABSOLUTE COUNT: 6.7 K/CU MM
SEGMENTED NEUTROPHILS RELATIVE PERCENT: 78.9 % (ref 36–66)
SODIUM BLD-SCNC: 148 MMOL/L (ref 135–145)
TOTAL IMMATURE NEUTOROPHIL: 0.09 K/CU MM
TOTAL NUCLEATED RBC: 0 K/CU MM
TOTAL PROTEIN: 3.8 GM/DL (ref 6.4–8.2)
WBC # BLD: 8.5 K/CU MM (ref 4–10.5)

## 2024-03-09 PROCEDURE — 87340 HEPATITIS B SURFACE AG IA: CPT

## 2024-03-09 PROCEDURE — 97166 OT EVAL MOD COMPLEX 45 MIN: CPT

## 2024-03-09 PROCEDURE — 80076 HEPATIC FUNCTION PANEL: CPT

## 2024-03-09 PROCEDURE — 2500000003 HC RX 250 WO HCPCS: Performed by: INTERNAL MEDICINE

## 2024-03-09 PROCEDURE — 86706 HEP B SURFACE ANTIBODY: CPT

## 2024-03-09 PROCEDURE — 2580000003 HC RX 258: Performed by: INTERNAL MEDICINE

## 2024-03-09 PROCEDURE — 6360000002 HC RX W HCPCS: Performed by: INTERNAL MEDICINE

## 2024-03-09 PROCEDURE — 97163 PT EVAL HIGH COMPLEX 45 MIN: CPT

## 2024-03-09 PROCEDURE — 83735 ASSAY OF MAGNESIUM: CPT

## 2024-03-09 PROCEDURE — 6370000000 HC RX 637 (ALT 250 FOR IP): Performed by: STUDENT IN AN ORGANIZED HEALTH CARE EDUCATION/TRAINING PROGRAM

## 2024-03-09 PROCEDURE — 86038 ANTINUCLEAR ANTIBODIES: CPT

## 2024-03-09 PROCEDURE — 6370000000 HC RX 637 (ALT 250 FOR IP): Performed by: INTERNAL MEDICINE

## 2024-03-09 PROCEDURE — 97116 GAIT TRAINING THERAPY: CPT

## 2024-03-09 PROCEDURE — 86704 HEP B CORE ANTIBODY TOTAL: CPT

## 2024-03-09 PROCEDURE — 36415 COLL VENOUS BLD VENIPUNCTURE: CPT

## 2024-03-09 PROCEDURE — 84156 ASSAY OF PROTEIN URINE: CPT

## 2024-03-09 PROCEDURE — 82570 ASSAY OF URINE CREATININE: CPT

## 2024-03-09 PROCEDURE — 6370000000 HC RX 637 (ALT 250 FOR IP): Performed by: FAMILY MEDICINE

## 2024-03-09 PROCEDURE — 80048 BASIC METABOLIC PNL TOTAL CA: CPT

## 2024-03-09 PROCEDURE — 84100 ASSAY OF PHOSPHORUS: CPT

## 2024-03-09 PROCEDURE — 87389 HIV-1 AG W/HIV-1&-2 AB AG IA: CPT

## 2024-03-09 PROCEDURE — 2140000000 HC CCU INTERMEDIATE R&B

## 2024-03-09 PROCEDURE — 85025 COMPLETE CBC W/AUTO DIFF WBC: CPT

## 2024-03-09 PROCEDURE — 97535 SELF CARE MNGMENT TRAINING: CPT

## 2024-03-09 RX ORDER — POLYETHYLENE GLYCOL 3350 17 G/17G
17 POWDER, FOR SOLUTION ORAL 2 TIMES DAILY
Status: DISCONTINUED | OUTPATIENT
Start: 2024-03-09 | End: 2024-03-16 | Stop reason: HOSPADM

## 2024-03-09 RX ORDER — CALCIUM GLUCONATE 20 MG/ML
2000 INJECTION, SOLUTION INTRAVENOUS ONCE
Status: COMPLETED | OUTPATIENT
Start: 2024-03-09 | End: 2024-03-09

## 2024-03-09 RX ORDER — IBUPROFEN 400 MG/1
400 TABLET ORAL
Status: COMPLETED | OUTPATIENT
Start: 2024-03-09 | End: 2024-03-09

## 2024-03-09 RX ADMIN — CALCIUM GLUCONATE 2000 MG: 20 INJECTION, SOLUTION INTRAVENOUS at 10:39

## 2024-03-09 RX ADMIN — IBUPROFEN 400 MG: 400 TABLET, FILM COATED ORAL at 23:47

## 2024-03-09 RX ADMIN — METHYLPREDNISOLONE SODIUM SUCCINATE 500 MG: 1 INJECTION INTRAMUSCULAR; INTRAVENOUS at 18:55

## 2024-03-09 RX ADMIN — HEPARIN SODIUM 5000 UNITS: 5000 INJECTION INTRAVENOUS; SUBCUTANEOUS at 21:24

## 2024-03-09 RX ADMIN — POLYETHYLENE GLYCOL 3350 17 G: 17 POWDER, FOR SOLUTION ORAL at 21:24

## 2024-03-09 RX ADMIN — SODIUM CHLORIDE, PRESERVATIVE FREE 10 ML: 5 INJECTION INTRAVENOUS at 08:53

## 2024-03-09 RX ADMIN — SODIUM ZIRCONIUM CYCLOSILICATE 5 G: 5 POWDER, FOR SUSPENSION ORAL at 12:40

## 2024-03-09 RX ADMIN — SODIUM CHLORIDE: 9 INJECTION, SOLUTION INTRAVENOUS at 10:38

## 2024-03-09 RX ADMIN — EPOETIN ALFA-EPBX 10000 UNITS: 10000 INJECTION, SOLUTION INTRAVENOUS; SUBCUTANEOUS at 21:39

## 2024-03-09 RX ADMIN — HEPARIN SODIUM 5000 UNITS: 5000 INJECTION INTRAVENOUS; SUBCUTANEOUS at 08:53

## 2024-03-09 RX ADMIN — SODIUM BICARBONATE: 84 INJECTION, SOLUTION INTRAVENOUS at 18:55

## 2024-03-09 RX ADMIN — SODIUM ZIRCONIUM CYCLOSILICATE 5 G: 5 POWDER, FOR SUSPENSION ORAL at 21:24

## 2024-03-09 ASSESSMENT — PAIN DESCRIPTION - DESCRIPTORS: DESCRIPTORS: ACHING

## 2024-03-09 ASSESSMENT — PAIN SCALES - GENERAL: PAINLEVEL_OUTOF10: 8

## 2024-03-09 ASSESSMENT — PAIN DESCRIPTION - LOCATION: LOCATION: COCCYX

## 2024-03-09 NOTE — PROGRESS NOTES
Rapid nurse Mariaelena attempted x2 for iv no success and Davida Arellano of this unit x1 no success.  ER called to come place IV  using ultra sound pt has very fragile veins . Dr. Garcia notified of attempts and no Picc team here today .  [id number] ER nurse here to place Iv

## 2024-03-09 NOTE — CONSULTS
Nephrology Service Consultation      [address], [address]  [place] [address]  Phone: [phone]  Office Hours: 8:30AM - 4:30PM  Monday - Friday        MEDICAL DECISION MAKING and Recommendations   -CARLO ANN from [facility] at least: cr 6.9 from baseline 0.7//other ddx include vasculitis, postinfectious GN//no HN on CT A/P, UA 22WBCS and 48RBCS (obtained from sexton cath)  -Hypernatremia  -Metabolic acidosis  -Acute anemia: no thrombocytopenia  -Recent sacral I&D and abx :cipro  -Chronic/subacute sexton cath  -Hypocalcemia  -Proteinuria    Suggest:  -Since she is hypernatremic today an acidotic, Will stop NS and start sodium bicarb 100mEq/L at 75ml/hr.  K is 5.1 so will give few doses of lokelma  -ANCA, SPEP, UP/C ordered, Hep panel, HIV.  ASO was low at [facility] and UrEos were present and C3 and C4 were low which can be seen in lupus nephritis so obtain KEVAN  -We discussed dialysis, she wants to think about it  -Avoid nsaids  -MOnitor UOP  -May have to do a course of pulse dose solumedrol  -Give few doses of retacrit to help the anemia    Thank you        Patient Active Problem List    Diagnosis Date Noted    CAROL ANN (acute kidney injury) ([facility]) 03/08/2024    Pressure injury of sacral region, unstageable ([facility]) 02/24/2024    Sacral wound, initial encounter 02/23/2024    Severe malnutrition ([facility]) 05/01/2023    Fracture, hip, left, closed, initial encounter ([facility]) 04/30/2023         Patient:  Nasima Romero  MRN: [id number]  Consulting physician:  Derrick Garcia MD  Reason for Consult: cr 6  PCP: No primary care provider on file.    HISTORY OF PRESENT ILLNESS:   The patient is a 74 y.o. female with sacral decubitus and recent dc from here on 2/23 for sacral wound I&D, presented from [facility]/ [facility] due to cr of 6 from being 0.7 just on 2/29/24, thus the renal consult    REVIEW OF SYSTEMS:  14 point ROS is Negative. See positive ROS per HPI    Past Medical History:    No past medical history on    BUN 72* 72*   CREATININE 6.7* 6.9*   GLUCOSE 100* 92     Hepatic:   Recent Labs     03/09/24  0535   AST 15   ALT 7*   BILITOT 0.3   ALKPHOS 64            Electronically signed by Richard Elizondo DO on 3/9/2024 at 10:17 AM    [facility]  MD JAYDEN BRYAN DO  [address] [facility],  [address] [place] [address]  PHONE: [phone]  FAX: [phone]

## 2024-03-09 NOTE — PROGRESS NOTES
V2.0  [facility] Hospitalist Progress Note      Name:  Nasima Romero /Age/Sex: 1950  (74 y.o. female)   MRN & CSN:  [id number] & [id number] Encounter Date/Time: 3/9/2024 9:00 AM EST    Location:  [address] [place] [address]-A PCP: No primary care provider on file.       Hospital Day: 2    Assessment and Plan:   Nasima Romero is a 74 y.o. female with pmh of  anemia, malnutrition, infected decubitus ulcer, failure to thrive  who presents with CAROL ANN (acute kidney injury) (HCC)      Plan:  CAROL ANN  Baseline sCr ~ 0.7-0.8  BUN/Cr 73/6.3 on presentation  -Monitor intake/output; sexton catheter was placed in the ED - unclear if she had any significant retention  -CT abdomen pelvis without contrast notable for sacral insufficiency fracture, moderate colonic stool  -IVF rehydration with NS at 50 cc/hr, switch to sodium bicarb due to acidosis by nephrology  -Consult nephrology, appreciate recs  -ANCA, SPEP, UP/C ordered, Hep panel, HIV. ASO was low at [place] and UrEos were present and C3 and C4 were low which can be seen in lupus nephritis so obtain KEVAN      Abdominal distension  -no complaints of pain  -passing gas and BM, denies any nausea/vomiting  -CT abdomen wo contrast shows large full debris distending the stomach with moderate colonic stool burden, likely due to ileus  -Patient started on scheduled stool softener.  -Monitor clinically     Sacral decubitus ulcer  -bed bound since fall in mid February causing worsening of the chronic sacral wound, on last admission, the wound was found to be necrotic, CT had shown potential abscess and osteomyelitis, underwent I&D down to muscle/fascia/bone, was started on vancomycin and cefepime, surgical culture grew ecoli, discharged on cipro and metronidazole   -wound care consult  -surgical consult if concern from wound care team     Severe Malnutrition  Failure to thrive  -nutritional supplement  -dietary consult  -difficult to get patient to understand appetite stimulant; discuss again and  pubic symphysis without intravenous contrast. Up-to-date CT equipment and radiation dose reduction techniques were employed. Findings CT of the abdomen without contrast: Small bilateral pleural effusions are identified. Marked distention of the stomach with food debris is identified. Moderate amount of stool identified throughout the colon. No hydronephrosis identified involving either kidney. A small amount of ascites surrounds the liver. Findings CT the pelvis: Small amount of free fluid the posterior pelvis. A linear sclerotic focus identified leftward sacrum. Also lucency through this region is identified.     1. Findings suspicious for possible sacral insufficiency fracture on the left. This can be further corroborated by MRI 2. Large amount of food debris distending the stomach. This is nonspecific. This could be related to a gastric ileus versus some form of gastric outlet obstruction 3. Moderate colonic stool 4. Nonspecific perihepatic ascites 5. Small bilateral pleural effusions with compressive atelectasis of adjacent lung parenchyma Electronically signed by MD Gerber Chong      Electronically signed by Derrick Garcia MD on 3/9/2024 at 9:00 AM

## 2024-03-10 LAB
ALBUMIN SERPL-MCNC: 2.5 GM/DL (ref 3.4–5)
ALP BLD-CCNC: 67 IU/L (ref 40–129)
ALT SERPL-CCNC: 8 U/L (ref 10–40)
ANION GAP SERPL CALCULATED.3IONS-SCNC: 15 MMOL/L (ref 7–16)
AST SERPL-CCNC: 14 IU/L (ref 15–37)
BASOPHILS ABSOLUTE: 0 K/CU MM
BASOPHILS RELATIVE PERCENT: 0.1 % (ref 0–1)
BILIRUB SERPL-MCNC: 0.3 MG/DL (ref 0–1)
BILIRUBIN DIRECT: 0.2 MG/DL (ref 0–0.3)
BILIRUBIN, INDIRECT: 0.1 MG/DL (ref 0–0.7)
BUN SERPL-MCNC: 84 MG/DL (ref 6–23)
CALCIUM SERPL-MCNC: 7.2 MG/DL (ref 8.3–10.6)
CHLORIDE BLD-SCNC: 104 MMOL/L (ref 99–110)
CO2: 20 MMOL/L (ref 21–32)
CREAT SERPL-MCNC: 7.1 MG/DL (ref 0.6–1.1)
CULTURE: ABNORMAL
CULTURE: ABNORMAL
DIFFERENTIAL TYPE: ABNORMAL
EOSINOPHILS ABSOLUTE: 0 K/CU MM
EOSINOPHILS RELATIVE PERCENT: 0 % (ref 0–3)
GFR SERPL CREATININE-BSD FRML MDRD: 6 ML/MIN/1.73M2
GLUCOSE SERPL-MCNC: 167 MG/DL (ref 70–99)
HCT VFR BLD CALC: 25 % (ref 37–47)
HEMOGLOBIN: 7.6 GM/DL (ref 12.5–16)
IMMATURE NEUTROPHIL %: 0.9 % (ref 0–0.43)
LYMPHOCYTES ABSOLUTE: 0.3 K/CU MM
LYMPHOCYTES RELATIVE PERCENT: 5.1 % (ref 24–44)
Lab: ABNORMAL
MAGNESIUM: 1.9 MG/DL (ref 1.8–2.4)
MCH RBC QN AUTO: 29.6 PG (ref 27–31)
MCHC RBC AUTO-ENTMCNC: 30.4 % (ref 32–36)
MCV RBC AUTO: 97.3 FL (ref 78–100)
MONOCYTES ABSOLUTE: 0.1 K/CU MM
MONOCYTES RELATIVE PERCENT: 0.7 % (ref 0–4)
NUCLEATED RBC %: 0 %
PDW BLD-RTO: 16.1 % (ref 11.7–14.9)
PHOSPHORUS: 7.3 MG/DL (ref 2.5–4.9)
PLATELET # BLD: 168 K/CU MM (ref 140–440)
PMV BLD AUTO: 9.3 FL (ref 7.5–11.1)
POTASSIUM SERPL-SCNC: 4.9 MMOL/L (ref 3.5–5.1)
RBC # BLD: 2.57 M/CU MM (ref 4.2–5.4)
SEGMENTED NEUTROPHILS ABSOLUTE COUNT: 6.2 K/CU MM
SEGMENTED NEUTROPHILS RELATIVE PERCENT: 93.2 % (ref 36–66)
SODIUM BLD-SCNC: 139 MMOL/L (ref 135–145)
SPECIMEN: ABNORMAL
TOTAL IMMATURE NEUTOROPHIL: 0.06 K/CU MM
TOTAL NUCLEATED RBC: 0 K/CU MM
TOTAL PROTEIN: 4.2 GM/DL (ref 6.4–8.2)
UUN 24H UR-MCNC: 224 MG/DL
WBC # BLD: 6.7 K/CU MM (ref 4–10.5)

## 2024-03-10 PROCEDURE — 2580000003 HC RX 258: Performed by: INTERNAL MEDICINE

## 2024-03-10 PROCEDURE — 85025 COMPLETE CBC W/AUTO DIFF WBC: CPT

## 2024-03-10 PROCEDURE — 2140000000 HC CCU INTERMEDIATE R&B

## 2024-03-10 PROCEDURE — 6370000000 HC RX 637 (ALT 250 FOR IP): Performed by: INTERNAL MEDICINE

## 2024-03-10 PROCEDURE — 6360000002 HC RX W HCPCS: Performed by: INTERNAL MEDICINE

## 2024-03-10 PROCEDURE — 84100 ASSAY OF PHOSPHORUS: CPT

## 2024-03-10 PROCEDURE — 83735 ASSAY OF MAGNESIUM: CPT

## 2024-03-10 PROCEDURE — 6370000000 HC RX 637 (ALT 250 FOR IP): Performed by: STUDENT IN AN ORGANIZED HEALTH CARE EDUCATION/TRAINING PROGRAM

## 2024-03-10 PROCEDURE — 94761 N-INVAS EAR/PLS OXIMETRY MLT: CPT

## 2024-03-10 PROCEDURE — 2500000003 HC RX 250 WO HCPCS: Performed by: INTERNAL MEDICINE

## 2024-03-10 PROCEDURE — 36415 COLL VENOUS BLD VENIPUNCTURE: CPT

## 2024-03-10 PROCEDURE — 80053 COMPREHEN METABOLIC PANEL: CPT

## 2024-03-10 PROCEDURE — 82248 BILIRUBIN DIRECT: CPT

## 2024-03-10 RX ORDER — LANOLIN ALCOHOL/MO/W.PET/CERES
3 CREAM (GRAM) TOPICAL NIGHTLY PRN
Status: DISCONTINUED | OUTPATIENT
Start: 2024-03-10 | End: 2024-03-16 | Stop reason: HOSPADM

## 2024-03-10 RX ORDER — VITAMIN B COMPLEX
5000 TABLET ORAL DAILY
Status: DISCONTINUED | OUTPATIENT
Start: 2024-03-10 | End: 2024-03-16 | Stop reason: HOSPADM

## 2024-03-10 RX ADMIN — HEPARIN SODIUM 5000 UNITS: 5000 INJECTION INTRAVENOUS; SUBCUTANEOUS at 21:21

## 2024-03-10 RX ADMIN — HEPARIN SODIUM 5000 UNITS: 5000 INJECTION INTRAVENOUS; SUBCUTANEOUS at 09:51

## 2024-03-10 RX ADMIN — SODIUM BICARBONATE: 84 INJECTION, SOLUTION INTRAVENOUS at 18:52

## 2024-03-10 RX ADMIN — Medication 5000 UNITS: at 09:51

## 2024-03-10 RX ADMIN — POLYETHYLENE GLYCOL 3350 17 G: 17 POWDER, FOR SOLUTION ORAL at 09:52

## 2024-03-10 RX ADMIN — SODIUM CHLORIDE, PRESERVATIVE FREE 10 ML: 5 INJECTION INTRAVENOUS at 21:25

## 2024-03-10 RX ADMIN — POLYETHYLENE GLYCOL 3350 17 G: 17 POWDER, FOR SOLUTION ORAL at 21:21

## 2024-03-10 RX ADMIN — METHYLPREDNISOLONE SODIUM SUCCINATE 500 MG: 1 INJECTION INTRAMUSCULAR; INTRAVENOUS at 16:51

## 2024-03-10 RX ADMIN — SODIUM ZIRCONIUM CYCLOSILICATE 5 G: 5 POWDER, FOR SUSPENSION ORAL at 09:52

## 2024-03-10 NOTE — PROGRESS NOTES
Nephrology Progress Note        [address] [facility], [address]  [place] [address]  Phone: [phone]  Office Hours: 8:30AM - 4:30PM  Monday - Friday        3/10/2024 7:44 AM  Subjective:   Admit Date: 3/8/2024  PCP: No primary care provider on file.  Interval History:   On room air    Diet: ADULT ORAL NUTRITION SUPPLEMENT; Breakfast, Lunch, Dinner; Standard High Calorie/High Protein Oral Supplement  ADULT DIET; Regular; Low Potassium (Less than 3000 mg/day); 2000 ml      Data:   Scheduled Meds:   polyethylene glycol  17 g Oral BID    sodium zirconium cyclosilicate  5 g Oral TID    epoetin abdirashid-epbx  10,000 Units SubCUTAneous Once per day on Tue Thu Sat    methylPREDNISolone  500 mg IntraVENous Daily    sodium chloride flush  5-40 mL IntraVENous 2 times per day    heparin (porcine)  5,000 Units SubCUTAneous BID     Continuous Infusions:   sodium bicarbonate 75 mEq in sterile water 1,000 mL infusion 50 mL/hr at 03/09/24 1855    sodium chloride 20 mL/hr at 03/09/24 1038     PRN Meds:sodium chloride flush, sodium chloride, potassium chloride **OR** potassium alternative oral replacement **OR** potassium chloride, magnesium sulfate, ondansetron **OR** ondansetron, acetaminophen **OR** acetaminophen  I/O last 3 completed shifts:  In: 70 [P.O.:60; I.V.:10]  Out: 400 [Urine:400]  No intake/output data recorded.    Intake/Output Summary (Last 24 hours) at 3/10/2024 0744  Last data filed at 3/10/2024 0000  Gross per 24 hour   Intake 60 ml   Output 400 ml   Net -340 ml       CBC:   Recent Labs     03/09/24  0535 03/10/24  0502   WBC 8.5 6.7   HGB 7.4* 7.6*    168       BMP:    Recent Labs     03/08/24  1721 03/09/24  0535 03/10/24  0502    148* 139   K 4.9 5.1 4.9    114* 104   CO2 22 19* 20*   BUN 72* 72* 84*   CREATININE 6.7* 6.9* 7.1*   GLUCOSE 100* 92 167*   CALCIUM 7.0* 6.6* 7.2*     Hepatic:   Recent Labs     03/09/24  0535 03/10/24  0502   AST 15 14*   ALT 7* 8*   BILITOT 0.3 0.3

## 2024-03-10 NOTE — PROGRESS NOTES
Orthopaedic Spine Consult    MD Nohemi Dodge IV, PA-C    Patient Name: Nasima Romero   (1950)  MRN   [id number]   Today's date:  3/10/2024     Reason for consult:  low back pain  Requesting physician:  Jose    CHIEF COMPLAINT:  low back pain    History Obtained From:  patient    HISTORY OF PRESENT ILLNESS:      The patient is a 74 y.o. female  who presents with low back pain after a fall in February.  I asked patient about her low back pain and she said \"they have all my records right there\".    Patient has pmh of anemia, malnutrition, infected decubitus ulcer, failure to thrive.  Patient lives with her son and grandson.  Patient states she can ambulate.  Patient states her low back pain is not very severe.      Past Medical History   No past medical history on file.    Past Surgical History         Procedure Laterality Date    ABSCESS DRAINAGE N/A 2/25/2024    INCISION AND DRAINAGE OF BILATERAL DECUBITUS WITH ABSCESS performed by Guanakito Huerta DO at [facility] OR    WRIST SURGERY         Medications Prior to Admission:     Prior to Admission medications    Medication Sig Start Date End Date Taking? Authorizing Provider   mirtazapine (REMERON) 15 MG tablet Take 1 tablet by mouth nightly 2/29/24   Justin Winston MD   metroNIDAZOLE (FLAGYL) 500 MG tablet Take 1 tablet by mouth every 8 hours for 10 days 2/29/24 3/10/24  Justin Winston MD   Ergocalciferol (VITAMIN D) 48398 units CAPS Take 50,000 Units by mouth once a week for 3 doses 3/2/24 3/17/24  Justin Winston MD       Allergies     Codeine, Darvon [propoxyphene], and Tylenol [acetaminophen]    Social History   TOBACCO:   reports that she has never smoked. She does not have any smokeless tobacco history on file.  ETOH:   has no history on file for alcohol use.  Patient currently lives with family     Family History   No family history on file.    Review of Systems   Constitutional:  No weight loss, no night sweats  Eyes:

## 2024-03-10 NOTE — PROGRESS NOTES
Physical Therapy  Facility/Department: [address]  Physical Therapy Initial Assessment    Name: Nasima Romero  : 1950  MRN: [id number]  Date of Service: 3/9/2024    Discharge Recommendations:  Subacute/Skilled Nursing Facility          Patient Diagnosis(es): CAROL ANN  Past Medical History:  has no past medical history on file.  Past Surgical History:  has a past surgical history that includes Wrist surgery and Abscess Drainage (N/A, 2024).    Assessment   Body Structures, Functions, Activity Limitations Requiring Skilled Therapeutic Intervention: Decreased functional mobility ;Decreased safe awareness;Decreased high-level IADLs;Decreased ADL status;Decreased cognition;Decreased endurance;Decreased balance;Increased pain;Decreased strength  Therapy Prognosis: Good  Decision Making: High Complexity  Clinical Presentation: unpredictable characteristics  Requires PT Follow-Up: Yes  Activity Tolerance  Activity Tolerance: Patient tolerated evaluation without incident     Plan   Physical Therapy Plan  General Plan: 3-5 times per week  Current Treatment Recommendations: Strengthening, ROM, Balance training, Functional mobility training, Transfer training, ADL/Self-care training, IADL training, Cognitive/Perceptual training, Endurance training, Cognitive reorientation, Equipment evaluation, education, & procurement, Neuromuscular re-education, Gait training, Stair training, Home exercise program, Safety education & training, Patient/Caregiver education & training, Therapeutic activities, Positioning, Pain management  Safety Devices  Type of Devices: All fall risk precautions in place, Patient at risk for falls, Call light within reach, Left in chair, Chair alarm in place, Gait belt, Nurse notified     Restrictions  Restrictions/Precautions  Restrictions/Precautions: General Precautions, Fall Risk     Subjective   General  Chart Reviewed: Yes  Patient assessed for rehabilitation services?: Yes  Family / Caregiver  bed/chair/toilet against back of legs, reach back, and sit slowly)  Ambulation  Surface: Level tile  Device: Rolling Walker  Assistance: Minimal assistance;Moderate assistance  Quality of Gait: decreased gait speed, decreased step length bilaterally, unsteady gait  Distance: 10 feet + 4 feet  Comments: with verbal and tactile cues for BLE placement, walker placement, and sequence throughout ambulation; with verbal and tactile cues to maintain upright posture in order to avoid COM shifting outside of LUZMARIA     Balance  Posture: Fair  Sitting - Static: Fair  Sitting - Dynamic: Fair  Standing - Static: Poor;+  Standing - Dynamic: Poor           Gait Training:  Cues were given for safety, sequence, device management, balance, posture, awareness, path.      Therapeutic Activity Training:   Therapeutic activity training was instructed today.  Cues were given for safety, sequence, UE/LE placement, awareness, and balance.    Activities performed today included bed mobility training, sup-sit, sit-stand, SPT.      [facility] - Mobility    AM-PAC Basic Mobility - Inpatient   How much help is needed turning from your back to your side while in a flat bed without using bedrails?: A Lot  How much help is needed moving from lying on your back to sitting on the side of a flat bed without using bedrails?: A Lot  How much help is needed moving to and from a bed to a chair?: A Lot  How much help is needed standing up from a chair using your arms?: A Lot  How much help is needed walking in hospital room?: A Lot  How much help is needed climbing 3-5 steps with a railing?: Total  AM-PAC Inpatient Mobility Raw Score : 11  AM-PAC Inpatient T-Scale Score : 33.86  Mobility Inpatient CMS 0-100% Score: 72.57  Mobility Inpatient CMS G-Code Modifier : CL           Goals  Long Term Goals  Time Frame for Long Term Goals : In one week:  Long Term Goal 1: Pt will complete all bed mobility with CGAx1  Long Term Goal 2: Pt will complete sit <> stand  transfers with CGAx1  Long Term Goal 3: Pt will ambulate 75 feet with CGAx1 with LRAD  Long Term Goal 4: Pt will independently complete 3 sets of 10 reps of BLE AROM exercises       Education  Patient Education  Education Given To: Patient  Education Provided: Role of Therapy;Energy Conservation;Fall Prevention Strategies;Plan of Care;IADL Safety;ADL Adaptive Strategies;Equipment;Transfer Training  Education Method: Demonstration;Verbal  Education Outcome: Verbalized understanding;Demonstrated understanding;Continued education needed      Time In: 1453  Time Out: 1535  Total Treatment Time: 42  Timed Code Treatment Minutes: 28          Moo Jiang PT, DPT  License #: [id number]

## 2024-03-10 NOTE — PROGRESS NOTES
V2.0  [facility] Hospitalist Progress Note      Name:  Nasima Romero /Age/Sex: 1950  (74 y.o. female)   MRN & CSN:  [id number] & [id number] Encounter Date/Time: 3/10/2024 9:00 AM EST    [facility]  [facility] PCP: No primary care provider on file.       Hospital Day: 3    Assessment and Plan:   Nasima Romero is a 74 y.o. female with pmh of  anemia, malnutrition, infected decubitus ulcer, failure to thrive  who presents with CAROL ANN (acute kidney injury) (HCC)      Plan:  CAROL ANN - worsening   Baseline sCr ~ 0.7-0.8  BUN/Cr 73/6.3 on presentation  -Monitor intake/output; sexton catheter was placed in the ED - unclear if she had any significant retention  -UA positive for possible UTI, Ucx NGTD  -CT abdomen pelvis without contrast notable for sacral insufficiency fracture, moderate colonic stool  -IVF rehydration with NS at 50 cc/hr, switch to sodium bicarb due to acidosis by nephrology  -Consult nephrology, appreciate recs, -Solumedrol 500mg iv daily for 3 doses   -ANCA, SPEP, UP/C ordered, Hep panel, HIV. ASO was low at [place] and UrEos were present and C3 and C4 were low which can be seen in lupus nephritis so obtain KEVAN   -Plan for renal biopsy as per nephrology after discussion with patient and family.  -In the setting of worsening renal function will defer initiation of dialysis to nephrology     Abdominal distension-improved  -no complaints of pain  -passing gas and BM, denies any nausea/vomiting  -CT abdomen wo contrast shows large full debris distending the stomach with moderate colonic stool burden, likely due to ileus  -Patient started on scheduled stool softener.  -Monitor clinically     Sacral decubitus ulcer  -bed bound since fall in mid February causing worsening of the chronic sacral wound, on last admission, the wound was found to be necrotic, CT had shown potential abscess and osteomyelitis, underwent I&D down to muscle/fascia/bone, was started on vancomycin and cefepime, surgical culture grew ecoli,  mg, Q6H PRN  acetaminophen, 650 mg, Q6H PRN   Or  acetaminophen, 650 mg, Q6H PRN        Labs      Recent Results (from the past 24 hour(s))   Magnesium    Collection Time: 03/10/24  5:02 AM   Result Value Ref Range    Magnesium 1.9 1.8 - 2.4 mg/dl   Phosphorus    Collection Time: 03/10/24  5:02 AM   Result Value Ref Range    Phosphorus 7.3 (H) 2.5 - 4.9 MG/DL   Hepatic Function Panel    Collection Time: 03/10/24  5:02 AM   Result Value Ref Range    Albumin 2.5 (L) 3.4 - 5.0 GM/DL    Total Bilirubin 0.3 0.0 - 1.0 MG/DL    Bilirubin, Direct 0.2 0.0 - 0.3 MG/DL    Bilirubin, Indirect 0.1 0 - 0.7 MG/DL    Alkaline Phosphatase 67 40 - 129 IU/L    AST 14 (L) 15 - 37 IU/L    ALT 8 (L) 10 - 40 U/L    Total Protein 4.2 (L) 6.4 - 8.2 GM/DL   CBC with Auto Differential    Collection Time: 03/10/24  5:02 AM   Result Value Ref Range    WBC 6.7 4.0 - 10.5 K/CU MM    RBC 2.57 (L) 4.2 - 5.4 M/CU MM    Hemoglobin 7.6 (L) 12.5 - 16.0 GM/DL    Hematocrit 25.0 (L) 37 - 47 %    MCV 97.3 78 - 100 FL    MCH 29.6 27 - 31 PG    MCHC 30.4 (L) 32.0 - 36.0 %    RDW 16.1 (H) 11.7 - 14.9 %    Platelets 168 140 - 440 K/CU MM    MPV 9.3 7.5 - 11.1 FL    Differential Type AUTOMATED DIFFERENTIAL     Segs Relative 93.2 (H) 36 - 66 %    Lymphocytes % 5.1 (L) 24 - 44 %    Monocytes % 0.7 0 - 4 %    Eosinophils % 0.0 0 - 3 %    Basophils % 0.1 0 - 1 %    Segs Absolute 6.2 K/CU MM    Lymphocytes Absolute 0.3 K/CU MM    Monocytes Absolute 0.1 K/CU MM    Eosinophils Absolute 0.0 K/CU MM    Basophils Absolute 0.0 K/CU MM    Nucleated RBC % 0.0 %    Total Nucleated RBC 0.0 K/CU MM    Total Immature Neutrophil 0.06 K/CU MM    Immature Neutrophil % 0.9 (H) 0 - 0.43 %   Basic Metabolic Panel    Collection Time: 03/10/24  5:02 AM   Result Value Ref Range    Sodium 139 135 - 145 MMOL/L    Potassium 4.9 3.5 - 5.1 MMOL/L    Chloride 104 99 - 110 mMol/L    CO2 20 (L) 21 - 32 MMOL/L    Anion Gap 15 7 - 16    Glucose 167 (H) 70 - 99 MG/DL    BUN 84 (H) 6 - 23 MG/DL     Creatinine 7.1 (H) 0.6 - 1.1 MG/DL    Est, Glom Filt Rate 6 (L) >60 mL/min/1.73m2    Calcium 7.2 (L) 8.3 - 10.6 MG/DL        Imaging/Diagnostics Last 24 Hours   CT ABDOMEN PELVIS WO CONTRAST Additional Contrast? None    Result Date: 3/8/2024  Reason: Abdominal distention CT the abdomen pelvis without contrast TECHNIQUE: Collimated helical images are made from the lower lungs through the pubic symphysis without intravenous contrast. Up-to-date CT equipment and radiation dose reduction techniques were employed. Findings CT of the abdomen without contrast: Small bilateral pleural effusions are identified. Marked distention of the stomach with food debris is identified. Moderate amount of stool identified throughout the colon. No hydronephrosis identified involving either kidney. A small amount of ascites surrounds the liver. Findings CT the pelvis: Small amount of free fluid the posterior pelvis. A linear sclerotic focus identified leftward sacrum. Also lucency through this region is identified.     1. Findings suspicious for possible sacral insufficiency fracture on the left. This can be further corroborated by MRI 2. Large amount of food debris distending the stomach. This is nonspecific. This could be related to a gastric ileus versus some form of gastric outlet obstruction 3. Moderate colonic stool 4. Nonspecific perihepatic ascites 5. Small bilateral pleural effusions with compressive atelectasis of adjacent lung parenchyma Electronically signed by MD Gerber Chong      Electronically signed by Derrick Garcia MD on 3/10/2024 at 8:25 AM

## 2024-03-10 NOTE — PROGRESS NOTES
Occupational Therapy  [facility] ACUTE CARE OCCUPATIONAL THERAPY EVALUATION    Nasima Romero, 1950, 3126/3126-A, 3/9/2024    Discharge Recommendation: Skilled Nursing Facility       History:  [place]:  There were no encounter diagnoses.  No past medical history on file.      Subjective:  Patient states: \"I'm getting really tired\"  Pain:  denies  Communication with other providers: co-eval w/ PT, handoff to RN  Restrictions: General Precautions, Fall Risk    Home Setup/Prior level of function:    Social/Functional History  Lives With: Son (son has special needs)  Type of Home: Trailer  Home Layout: One level  Home Access: Stairs to enter with rails  Entrance Stairs - Number of Steps: 4  Entrance Stairs - Rails: Both  Bathroom Shower/Tub: Tub/Shower unit  Bathroom Toilet: Standard  Bathroom Accessibility: Accessible  Home Equipment: Walker, 4 wheeled  Has the patient had two or more falls in the past year or any fall with injury in the past year?: No  Receives Help From: Family  ADL Assistance: Independent  Homemaking Assistance: Needs assistance (pt manages most IADLs but grandson or daughter will help PRN)  Homemaking Responsibilities: Yes  Ambulation Assistance: Independent (uses no AD)  Transfer Assistance: Independent  Active : No  Occupation: Retired    Examination:  Observation: Supine in bed upon arrival, agreeable to therapy eval.  Vision: WFL  Hearing: WFL  Vitals: Stable vitals throughout session on room air      Body Systems and functions:  ROM: WFL   Strength: B UE grossly 4-/5 across all major joints   Sensation: WFL  Tone: Normal  Coordination: WFL  Perception: WNL      Cognitive and Psychosocial Functioning:  Overall cognitive status: WFL  Affect: Normal       Functional Mobility:  Bed mobility:  supine to sitting EOB w/ min A, Vcs for initiation and sequencing  Sitting balance:  SBA    Transfers: STS from EOB w/ min A, stand to sit to recliner w/ CGA. Vcs for hand placement and

## 2024-03-10 NOTE — PROGRESS NOTES
-I SPOKE TO THE DAUGHTER JESSENIA AT THE HOME PHONE NUMBER ON FILE, ABOUT DIALYSIS AND RENAL BIOPSY    -SHE WILL KEEP DISCUSSING WITH HER MOM FRANCISCO    THANK YOU

## 2024-03-10 NOTE — CONSULTS
Comprehensive Nutrition Assessment    Type and Reason for Visit:  Initial, Consult (Poor Appetite/Intake 5 or More days, Unintentional wt loss, Wounnds, Oral nutrition supplements, Diet education for improving intake)    Nutrition Recommendations/Plan:   Will order Wound healing (Felipe) oral nutrition supplements BID   Endorse pt to drink Strawberry Ensure Complete at least 1-2x day   Liberalize to Soft and Bite Sized Diet   Monitor for wound care notes, GI status, PO intakes, and POC  Encourage proper hydration/fluids intake     Malnutrition Assessment:  Malnutrition Status:  Severe malnutrition (03/10/24 1258)    Context:  Chronic Illness     Findings of the 6 clinical characteristics of malnutrition:  Energy Intake:  Mild decrease in energy intake (Comment)  Weight Loss:  Unable to assess     Body Fat Loss:  Severe body fat loss Orbital, Triceps, Buccal region   Muscle Mass Loss:  Severe muscle mass loss Temples (temporalis), Clavicles (pectoralis & deltoids), Hand (interosseous)  Fluid Accumulation:  Unable to assess     Strength:  Not Performed    Nutrition Assessment:    Admitted with CAROL ANN worsening. Hx Sacral wounds, Anemia, Underweight. Pt eating lunch meal, requesting mashed potatoes and MD maeve approved to liberalize diet from Low Potassium to Regular. Pt states feeling better than she's been feeling in a long time. Reports eating more with Ensure Complete strawberry and drinking Propel water, noted pt had poor hydration. Pt requesting soft and bite sized texture, hx of minced and moist diet, pt states having less chewing problems this time. Noted wounds, pt has been bed bound since fall in mid February. Encourage pt to eat protein and adequate hydration for healing. Discussed briefly over renal diet, pt became upset of possibly losing foods that she enjoys, thus, will not restrict diet. Pt with CAROL ANN, abnormal renal labs, nephrologist will monitor, RD will not place renal diet due to pt meeting  [place] about Low K diet/phos binder, no plans to start phos binders; PS hospitalist re: malnutrition dx    Goals:     Goals: Meet at least 75% of estimated needs       Nutrition Monitoring and Evaluation:   Behavioral-Environmental Outcomes: None Identified  Food/Nutrient Intake Outcomes: Diet Advancement/Tolerance, Food and Nutrient Intake, Supplement Intake  Physical Signs/Symptoms Outcomes: Biochemical Data, Chewing or Swallowing, Meal Time Behavior, Nutrition Focused Physical Findings, Weight, Skin    Discharge Planning:    Too soon to determine     Hilda Durán RD, LD  Contact: [phone]

## 2024-03-11 LAB
ALBUMIN SERPL-MCNC: 2.3 GM/DL (ref 3.4–5)
ALP BLD-CCNC: 71 IU/L (ref 40–129)
ALT SERPL-CCNC: 7 U/L (ref 10–40)
ANCA AB PATTERN SER IF-IMP: NORMAL
ANCA IGG TITR SER IF: NORMAL {TITER}
ANION GAP SERPL CALCULATED.3IONS-SCNC: 15 MMOL/L (ref 7–16)
AST SERPL-CCNC: 13 IU/L (ref 15–37)
BASOPHILS ABSOLUTE: 0 K/CU MM
BASOPHILS RELATIVE PERCENT: 0 % (ref 0–1)
BILIRUB SERPL-MCNC: 0.2 MG/DL (ref 0–1)
BILIRUBIN DIRECT: 0.2 MG/DL (ref 0–0.3)
BILIRUBIN, INDIRECT: 0 MG/DL (ref 0–0.7)
BUN SERPL-MCNC: 101 MG/DL (ref 6–23)
CALCIUM SERPL-MCNC: 6.8 MG/DL (ref 8.3–10.6)
CHLORIDE BLD-SCNC: 102 MMOL/L (ref 99–110)
CO2: 21 MMOL/L (ref 21–32)
CREAT SERPL-MCNC: 6.9 MG/DL (ref 0.6–1.1)
DIFFERENTIAL TYPE: ABNORMAL
EOSINOPHILS ABSOLUTE: 0 K/CU MM
EOSINOPHILS RELATIVE PERCENT: 0 % (ref 0–3)
GFR SERPL CREATININE-BSD FRML MDRD: 6 ML/MIN/1.73M2
GLUCOSE SERPL-MCNC: 161 MG/DL (ref 70–99)
HCT VFR BLD CALC: 21.9 % (ref 37–47)
HCT VFR BLD CALC: 26.7 % (ref 37–47)
HCT VFR BLD CALC: 27.8 % (ref 37–47)
HEMOGLOBIN: 6.9 GM/DL (ref 12.5–16)
HEMOGLOBIN: 8.1 GM/DL (ref 12.5–16)
HEMOGLOBIN: 8.8 GM/DL (ref 12.5–16)
IMMATURE NEUTROPHIL %: 1.7 % (ref 0–0.43)
LYMPHOCYTES ABSOLUTE: 0.5 K/CU MM
LYMPHOCYTES RELATIVE PERCENT: 4.4 % (ref 24–44)
MAGNESIUM: 1.7 MG/DL (ref 1.8–2.4)
MCH RBC QN AUTO: 29.7 PG (ref 27–31)
MCHC RBC AUTO-ENTMCNC: 31.5 % (ref 32–36)
MCV RBC AUTO: 94.4 FL (ref 78–100)
MONOCYTES ABSOLUTE: 0.1 K/CU MM
MONOCYTES RELATIVE PERCENT: 1.3 % (ref 0–4)
NUCLEATED RBC %: 0 %
PDW BLD-RTO: 15.9 % (ref 11.7–14.9)
PHOSPHORUS: 7.4 MG/DL (ref 2.5–4.9)
PLATELET # BLD: 202 K/CU MM (ref 140–440)
PMV BLD AUTO: 9.7 FL (ref 7.5–11.1)
POTASSIUM SERPL-SCNC: 4.4 MMOL/L (ref 3.5–5.1)
RBC # BLD: 2.32 M/CU MM (ref 4.2–5.4)
SEGMENTED NEUTROPHILS ABSOLUTE COUNT: 9.8 K/CU MM
SEGMENTED NEUTROPHILS RELATIVE PERCENT: 92.6 % (ref 36–66)
SMEAR REVIEW: NORMAL
SODIUM BLD-SCNC: 138 MMOL/L (ref 135–145)
TOTAL IMMATURE NEUTOROPHIL: 0.18 K/CU MM
TOTAL NUCLEATED RBC: 0 K/CU MM
TOTAL PROTEIN: 3.7 GM/DL (ref 6.4–8.2)
WBC # BLD: 10.6 K/CU MM (ref 4–10.5)

## 2024-03-11 PROCEDURE — 85025 COMPLETE CBC W/AUTO DIFF WBC: CPT

## 2024-03-11 PROCEDURE — 36410 VNPNXR 3YR/> PHY/QHP DX/THER: CPT

## 2024-03-11 PROCEDURE — 6370000000 HC RX 637 (ALT 250 FOR IP): Performed by: INTERNAL MEDICINE

## 2024-03-11 PROCEDURE — 85014 HEMATOCRIT: CPT

## 2024-03-11 PROCEDURE — 36430 TRANSFUSION BLD/BLD COMPNT: CPT

## 2024-03-11 PROCEDURE — 2580000003 HC RX 258: Performed by: INTERNAL MEDICINE

## 2024-03-11 PROCEDURE — 86850 RBC ANTIBODY SCREEN: CPT

## 2024-03-11 PROCEDURE — 80053 COMPREHEN METABOLIC PANEL: CPT

## 2024-03-11 PROCEDURE — 94761 N-INVAS EAR/PLS OXIMETRY MLT: CPT

## 2024-03-11 PROCEDURE — P9016 RBC LEUKOCYTES REDUCED: HCPCS

## 2024-03-11 PROCEDURE — 6360000002 HC RX W HCPCS: Performed by: INTERNAL MEDICINE

## 2024-03-11 PROCEDURE — 30233N1 TRANSFUSION OF NONAUTOLOGOUS RED BLOOD CELLS INTO PERIPHERAL VEIN, PERCUTANEOUS APPROACH: ICD-10-PCS | Performed by: INTERNAL MEDICINE

## 2024-03-11 PROCEDURE — 86922 COMPATIBILITY TEST ANTIGLOB: CPT

## 2024-03-11 PROCEDURE — 6370000000 HC RX 637 (ALT 250 FOR IP): Performed by: STUDENT IN AN ORGANIZED HEALTH CARE EDUCATION/TRAINING PROGRAM

## 2024-03-11 PROCEDURE — 76937 US GUIDE VASCULAR ACCESS: CPT

## 2024-03-11 PROCEDURE — 97605 NEG PRS WND THER DME<=50SQCM: CPT

## 2024-03-11 PROCEDURE — 2140000000 HC CCU INTERMEDIATE R&B

## 2024-03-11 PROCEDURE — 86900 BLOOD TYPING SEROLOGIC ABO: CPT

## 2024-03-11 PROCEDURE — 83735 ASSAY OF MAGNESIUM: CPT

## 2024-03-11 PROCEDURE — 05HC33Z INSERTION OF INFUSION DEVICE INTO LEFT BASILIC VEIN, PERCUTANEOUS APPROACH: ICD-10-PCS | Performed by: INTERNAL MEDICINE

## 2024-03-11 PROCEDURE — 99232 SBSQ HOSP IP/OBS MODERATE 35: CPT | Performed by: PHYSICIAN ASSISTANT

## 2024-03-11 PROCEDURE — 86901 BLOOD TYPING SEROLOGIC RH(D): CPT

## 2024-03-11 PROCEDURE — 36415 COLL VENOUS BLD VENIPUNCTURE: CPT

## 2024-03-11 PROCEDURE — 82248 BILIRUBIN DIRECT: CPT

## 2024-03-11 PROCEDURE — 84100 ASSAY OF PHOSPHORUS: CPT

## 2024-03-11 PROCEDURE — 85018 HEMOGLOBIN: CPT

## 2024-03-11 PROCEDURE — 6370000000 HC RX 637 (ALT 250 FOR IP): Performed by: FAMILY MEDICINE

## 2024-03-11 RX ORDER — OXYCODONE HYDROCHLORIDE 5 MG/1
5 TABLET ORAL EVERY 4 HOURS PRN
Status: DISCONTINUED | OUTPATIENT
Start: 2024-03-11 | End: 2024-03-15

## 2024-03-11 RX ORDER — CALCITRIOL 0.25 UG/1
0.5 CAPSULE, LIQUID FILLED ORAL 2 TIMES DAILY
Status: COMPLETED | OUTPATIENT
Start: 2024-03-11 | End: 2024-03-13

## 2024-03-11 RX ORDER — SODIUM CHLORIDE 9 MG/ML
INJECTION, SOLUTION INTRAVENOUS PRN
Status: DISCONTINUED | OUTPATIENT
Start: 2024-03-11 | End: 2024-03-15

## 2024-03-11 RX ORDER — FLUCONAZOLE 100 MG/1
200 TABLET ORAL DAILY
Status: DISCONTINUED | OUTPATIENT
Start: 2024-03-11 | End: 2024-03-11

## 2024-03-11 RX ORDER — FLUCONAZOLE 100 MG/1
100 TABLET ORAL DAILY
Status: DISCONTINUED | OUTPATIENT
Start: 2024-03-11 | End: 2024-03-16 | Stop reason: HOSPADM

## 2024-03-11 RX ADMIN — FLUCONAZOLE 100 MG: 100 TABLET ORAL at 15:13

## 2024-03-11 RX ADMIN — Medication 5000 UNITS: at 10:01

## 2024-03-11 RX ADMIN — POLYETHYLENE GLYCOL 3350 17 G: 17 POWDER, FOR SOLUTION ORAL at 10:08

## 2024-03-11 RX ADMIN — METHYLPREDNISOLONE SODIUM SUCCINATE 500 MG: 1 INJECTION INTRAMUSCULAR; INTRAVENOUS at 23:06

## 2024-03-11 RX ADMIN — SODIUM CHLORIDE, PRESERVATIVE FREE 10 ML: 5 INJECTION INTRAVENOUS at 10:01

## 2024-03-11 RX ADMIN — HEPARIN SODIUM 5000 UNITS: 5000 INJECTION INTRAVENOUS; SUBCUTANEOUS at 20:56

## 2024-03-11 RX ADMIN — HEPARIN SODIUM 5000 UNITS: 5000 INJECTION INTRAVENOUS; SUBCUTANEOUS at 10:01

## 2024-03-11 RX ADMIN — CALCITRIOL CAPSULES 0.25 MCG 0.5 MCG: 0.25 CAPSULE ORAL at 10:01

## 2024-03-11 RX ADMIN — CALCITRIOL CAPSULES 0.25 MCG 0.5 MCG: 0.25 CAPSULE ORAL at 20:56

## 2024-03-11 RX ADMIN — SODIUM CHLORIDE, PRESERVATIVE FREE 10 ML: 5 INJECTION INTRAVENOUS at 21:05

## 2024-03-11 RX ADMIN — Medication 3 MG: at 00:19

## 2024-03-11 RX ADMIN — POLYETHYLENE GLYCOL 3350 17 G: 17 POWDER, FOR SOLUTION ORAL at 20:56

## 2024-03-11 ASSESSMENT — PAIN DESCRIPTION - PAIN TYPE: TYPE: CHRONIC PAIN

## 2024-03-11 ASSESSMENT — ENCOUNTER SYMPTOMS
COUGH: 0
DIARRHEA: 0
SORE THROAT: 0
CONSTIPATION: 0
ABDOMINAL DISTENTION: 0
BACK PAIN: 0
EYE DISCHARGE: 0
WHEEZING: 0
SHORTNESS OF BREATH: 0

## 2024-03-11 ASSESSMENT — PAIN DESCRIPTION - ORIENTATION: ORIENTATION: RIGHT;LEFT

## 2024-03-11 ASSESSMENT — PAIN DESCRIPTION - FREQUENCY: FREQUENCY: INTERMITTENT

## 2024-03-11 ASSESSMENT — PAIN DESCRIPTION - LOCATION: LOCATION: COCCYX

## 2024-03-11 ASSESSMENT — PAIN SCALES - GENERAL
PAINLEVEL_OUTOF10: 0
PAINLEVEL_OUTOF10: 3

## 2024-03-11 ASSESSMENT — PAIN DESCRIPTION - ONSET: ONSET: ON-GOING

## 2024-03-11 ASSESSMENT — PAIN - FUNCTIONAL ASSESSMENT: PAIN_FUNCTIONAL_ASSESSMENT: ACTIVITIES ARE NOT PREVENTED

## 2024-03-11 ASSESSMENT — PAIN DESCRIPTION - DESCRIPTORS: DESCRIPTORS: ACHING

## 2024-03-11 NOTE — CONSULTS
[facility] Nurse  Consult Note       Nasima Romero  AGE: 74 y.o.   GENDER: female  : 1950  TODAY'S DATE:  3/11/2024    Subjective:     Reason for Evaluation and Assessment: skin assessment      Nasima Romero is a 74 y.o. female referred by:   [] Physician  [] Nursing  [] Other:     Wound Identification:  Wound Type: pressure  Contributing Factors: chronic pressure, decreased mobility, shear force, and incontinence of stool        PAST MEDICAL HISTORY    No past medical history on file.    PAST SURGICAL HISTORY    Past Surgical History:   Procedure Laterality Date    ABSCESS DRAINAGE N/A 2024    INCISION AND DRAINAGE OF BILATERAL DECUBITUS WITH ABSCESS performed by Guanakito Huerta DO at [facility] OR    WRIST SURGERY         FAMILY HISTORY    No family history on file.    SOCIAL HISTORY    Social History     Tobacco Use    Smoking status: Never   Vaping Use    Vaping Use: Never used       ALLERGIES    Allergies   Allergen Reactions    Codeine     Darvon [Propoxyphene]     Tylenol [Acetaminophen]        MEDICATIONS    No current facility-administered medications on file prior to encounter.     Current Outpatient Medications on File Prior to Encounter   Medication Sig Dispense Refill    mirtazapine (REMERON) 15 MG tablet Take 1 tablet by mouth nightly 30 tablet 3    Ergocalciferol (VITAMIN D) 30211 units CAPS Take 50,000 Units by mouth once a week for 3 doses 5 capsule 0         Objective:      /61   Pulse 84   Temp 97.7 °F (36.5 °C) (Oral)   Resp 25   Ht 1.626 m (5' 4\")   Wt 44.4 kg (97 lb 14.2 oz)   SpO2 93%   BMI 16.80 kg/m²   Miguel Risk Score: Miguel Scale Score: 11    LABS    CBC:   Lab Results   Component Value Date/Time    WBC 10.6 2024 02:46 AM    RBC 2.32 2024 02:46 AM    HGB 8.1 2024 10:01 AM    HCT 26.7 2024 10:01 AM    MCV 94.4 2024 02:46 AM    MCH 29.7 2024 02:46 AM    MCHC 31.5 2024 02:46 AM    RDW 15.9 2024 02:46 AM  1150   Undermining Ends___ O'Clock 4 03/11/24 1150   Undermining Maxium Distance (cm) 3 03/11/24 1150   Wound Assessment Pink/red;Exposed structure bone 03/11/24 1150   Drainage Amount Moderate (25-50%) 03/11/24 1150   Drainage Description Serosanguinous 03/11/24 1150   Odor None 03/11/24 1150   Kellee-wound Assessment Fragile 03/11/24 1150   Margins Defined edges 03/11/24 1150   Wound Thickness Description not for Pressure Injury Full thickness 03/11/24 1150   Number of days: 17       Wound 03/08/24 Back Upper;Other (Comment) (Active)   Dressing Status Clean;Dry;Intact 03/10/24 1624   Dressing/Treatment Open to air 03/10/24 1624   Wound Length (cm) 0 cm 03/11/24 1150   Wound Width (cm) 0 cm 03/11/24 1150   Wound Depth (cm) 0 cm 03/11/24 1150   Wound Surface Area (cm^2) 0 cm^2 03/11/24 1150   Wound Volume (cm^3) 0 cm^3 03/11/24 1150   Distance Tunneling (cm) 0 cm 03/11/24 1150   Tunneling Position ___ O'Clock 0 03/11/24 1150   Undermining Starts ___ O'Clock 0 03/11/24 1150   Undermining Ends___ O'Clock 0 03/11/24 1150   Undermining Maxium Distance (cm) 00 03/11/24 1150   Wound Assessment Pink/red 03/11/24 1150   Drainage Amount None (dry) 03/11/24 1150   Odor None 03/11/24 1150   Kellee-wound Assessment Hyperpigmented 03/11/24 1150   Margins Attached edges 03/11/24 1150   Number of days: 2       Wound 03/08/24 Heel Left nonblanchable red, mushy (Active)   Dressing Status Dry;Clean;Intact 03/10/24 2121   Dressing/Treatment Foam 03/10/24 2121   Wound Length (cm) 0 cm 03/11/24 1150   Wound Width (cm) 0 cm 03/11/24 1150   Wound Depth (cm) 0 cm 03/11/24 1150   Wound Surface Area (cm^2) 0 cm^2 03/11/24 1150   Wound Volume (cm^3) 0 cm^3 03/11/24 1150   Wound Assessment Pink/red 03/11/24 1150   Drainage Amount None (dry) 03/11/24 1150   Odor None 03/11/24 1150   Kellee-wound Assessment Intact;Fragile;Hyperkeratosis (callous) 03/11/24 1150   Margins Attached edges 03/11/24 1150   Number of days: 2       Wound 03/08/24 Heel Right  Pressure Relief  [] Other:     Discharge Plan:  Placement for patient upon discharge: to be determined   Hospice Care: not at this time  Patient appropriate for Outpatient Wound Care Center: not at this time     Patient/Caregiver Teaching:  Level of patient/caregiver understanding able to:   Understands instructions and follows directions, no questions at this time        Electronically signed by Chana Ny RN, on 3/11/2024 at 12:26 PM

## 2024-03-11 NOTE — PROGRESS NOTES
V2.0  [facility] [place]      Name:  Nasima Romero /Age/Sex: 1950  (74 y.o. female)   MRN & CSN:  [id number] & [id number] Encounter Date/Time: 3/11/2024 9:00 AM EST    [facility]  [facility] PCP: No primary care provider on file.       Hospital Day: 4    Assessment and Plan:   Nasima Romero is a 74 y.o. female with pmh of  anemia, malnutrition, infected decubitus ulcer, failure to thrive  who presents with CAROL ANN (acute kidney injury) (HCC)      Plan:  CAROL ANN   Baseline sCr ~ 0.7-0.8  BUN/Cr 73/6.3 on presentation  -Monitor intake/output; sexton catheter was placed in the ED - unclear if she had any significant retention  -UA positive for possible UTI, urine culture showing Candida albicans, started on fluconazole for 10-day course  -CT abdomen pelvis without contrast notable for sacral insufficiency fracture, moderate colonic stool  -IVF rehydration with NS at 50 cc/hr, switch to sodium bicarb due to acidosis by nephrology  -Consult nephrology, appreciate recs, -Solumedrol 500mg iv daily for 3 doses   -ANCA, SPEP, UP/C ordered, Hep panel, HIV. ASO was low at [place] and UrEos were present and C3 and C4 were low which can be seen in lupus nephritis so obtain KEVAN   -Plan for renal biopsy as per nephrology after discussion with patient and family, plan deferred due to acute anemia, patient received 1 PRBC transfusion, check posttransfusion Hb  -In the setting of worsening renal function will defer initiation of dialysis to nephrology     Abdominal distension-improved  -no complaints of pain  -passing gas and BM, denies any nausea/vomiting  -CT abdomen wo contrast shows large full debris distending the stomach with moderate colonic stool burden, likely due to ileus  -Patient started on scheduled stool softener.  -Monitor clinically     Sacral decubitus ulcer  -bed bound since fall in mid February causing worsening of the chronic sacral wound, on last admission, the wound was found to be necrotic, CT  had shown potential abscess and osteomyelitis, underwent I&D down to muscle/fascia/bone, was started on vancomycin and cefepime, surgical culture grew ecoli, discharged on cipro and metronidazole   -wound care consult  -surgical consult if concern from wound care team     Severe Malnutrition  Failure to thrive  -nutritional supplement  -dietary consult  -difficult to get patient to understand appetite stimulant; discuss again and start  -PT/OT    6.  Sacral insufficiency fracture  -Patient complaining of lower back pain, denies any incontinence  -Orthopedic consulted, appears to be subacute versus chronic in appearance.  No indication for any acute intervention as per orthopedic surgery.ok to ambulate.     CODE STATUS  I personally discussed goals of care and advance care directives with the patient on 3/9.  Patient does not wish to undergo chest compressions or intubation.  Okay with resuscitative medications.  Patient is alert and oriented and has insight into her disease.  Patient has capacity to make medical decisions.  CODE STATUS updated in chart.    Diet ADULT ORAL NUTRITION SUPPLEMENT; Breakfast, Lunch; Wound Healing Oral Supplement  ADULT DIET; Dysphagia - Soft and Bite Sized; 2000 ml; Likes extra whipped cream and gravies   DVT Prophylaxis [] Lovenox, [x]  Heparin, [] SCDs, [] Ambulation,  [] Eliquis, [] Xarelto  [] Coumadin   Code Status Limited   Disposition From: Nursing home  Expected Disposition: Nursing home  Estimated Date of Discharge: 4 - 5 days  Patient requires continued admission due to CAROL ANN   Surrogate Decision Maker/ POA Patient did not choose to elect anyone     Subjective:     Chief Complaint: No chief complaint on file.       Patient seen and evaluated at bedside.  No acute overnight events.  Patient appears to be severely cachectic and debilitated.  Noted to be alert and oriented and conversing appropriately.  Patient is very concerned about her health and asked me repeatedly if she is  going to die.  Counseling provided.  Able to tolerate diet this morning.  Denies any nausea/vomiting.  Denies any other active complaints.  Plan of care discussed at length including possibility of dialysis and kidney biopsy.  I explained the plan for kidney biopsy has to be delayed due to her acute anemia.  Patient verbalized understanding.         Discussed with nephrology, CM regarding patient care.     Imaging that was interpreted personally includes CT abdomen pelvis      Drugs that require monitoring for toxicity include heparin and monitor with CBC, BMP    Discussed management of the case in detail w/ the patient    Comment: Please note this report has been produced using speech recognition software and may contain errors related to that system including errors in grammar, punctuation, and spelling, as well as words and phrases that may be inappropriate. If there are any questions or concerns please feel free to contact the dictating provider for clarification.       Review of Systems:    Review of Systems   Constitutional:  Positive for activity change, appetite change and fatigue. Negative for diaphoresis.   HENT:  Negative for congestion and sore throat.    Eyes:  Negative for discharge and visual disturbance.   Respiratory:  Negative for cough, shortness of breath and wheezing.    Cardiovascular:  Negative for chest pain, palpitations and leg swelling.   Gastrointestinal:  Negative for abdominal distention, constipation and diarrhea.   Genitourinary:  Negative for difficulty urinating and hematuria.   Musculoskeletal:  Positive for arthralgias and gait problem. Negative for back pain.   Skin:  Positive for wound.   Neurological:  Negative for dizziness, syncope and speech difficulty.   Hematological:  Negative for adenopathy.   Psychiatric/Behavioral:  Negative for agitation and confusion.    All other systems reviewed and are negative.        Objective:     Intake/Output Summary (Last 24 hours) at

## 2024-03-11 NOTE — CONSULTS
Consult completed. #20ga AccuCath ACE Extended Dwell MidLine Catheter initiated to LUE Basilic Vein using sterile, UltraSound-guided technique without difficulty/complications. Positioning verified via UltraSound visualization of catheter within vessel lumen; site returns blood briskly and flushes without resistance/abnormalities. Sterile dressing with SkinPrep, StatLock Securing Device, CHG gel dressing, SwabCap, and Limb Precautions band applied. Pt tolerated well & no other c/o or needs noted or reported. RN notified.

## 2024-03-11 NOTE — PROGRESS NOTES
Pt loss access, iv team came to bedside and placed PIV. Blood transfusion started @1332 @60ml/hr x 15 minutes. This nurse at bedside. No signs of distress or pain noted. Care plan ongoing

## 2024-03-11 NOTE — PROGRESS NOTES
Occupational Therapy    Attempted at 1523 hrs after Nurse Herve reports pt has had a rough day c new wound vac placed to buttocks wound. On approach, pt eating mashed potatoes and states that would like to try to eat. \"I've had the works today. I'm hurting so bad.\" Pt politely declined any bed level treatment and was agreeable to re-attempt tomorrow.    Electronically signed by:    JERROD Christensen  3/11/2024, 2:02 PM

## 2024-03-11 NOTE — CONSENT
Informed Consent for Blood Component Transfusion Note    I have discussed with the patient the rationale for blood component transfusion; its benefits in treating or preventing fatigue, organ damage, or death; and its risk which includes mild transfusion reactions, rare risk of blood borne infection, or more serious but rare reactions. I have discussed the alternatives to transfusion, including the risk and consequences of not receiving transfusion. The patient had an opportunity to ask questions and had agreed to proceed with transfusion of blood components.    Electronically signed by Derrick Garcia MD on 3/11/24 at 11:44 AM EDT

## 2024-03-11 NOTE — CONSULTS
Consult completed. Nexiva Diffusics 22g 1\" peripheral IV inserted into right lower forearm x 1 attempt using ultrasound guided technique. Brisk blood return, flushes without resistance. Patient tolerated well. Primary nurse Herve notified.     Consult the Vascular Access Team for questions, concerns, or change in patient's condition.

## 2024-03-11 NOTE — CARE COORDINATION
03/11/24 1633   Service Assessment   Patient Orientation Alert and Oriented   Cognition Alert   History Provided By Patient   Primary Caregiver Other (Comment)  ([facility] skilled)   Support Systems Family Members   Patient's Healthcare Decision Maker is: Legal Next of Kin   PCP Verified by CM No  (PCP list added to discharge instructions)   Prior Functional Level Assistance with the following:;Mobility   Current Functional Level Assistance with the following:;Mobility   Can patient return to prior living arrangement Unknown at present   Ability to make needs known: Good   Family able to assist with home care needs: No   Would you like for me to discuss the discharge plan with any other family members/significant others, and if so, who? No   Financial Resources Medicare   Community Resources ECF/Home Care     CM in to see Pt to initiate discharge planning.  Pt from [facility] and plans to return at this time.     CM call to [facility] to update, left  for return call.     CM following

## 2024-03-11 NOTE — DISCHARGE INSTRUCTIONS
You can follow up with Urology office to get your sexton exchange. Its placed currently mostly for comfort and due to your decubiti. They can also check if you want to do a voiding trail

## 2024-03-11 NOTE — PLAN OF CARE
Problem: Discharge Planning  Goal: Discharge to home or other facility with appropriate resources  Outcome: Progressing     Problem: Skin/Tissue Integrity  Goal: Absence of new skin breakdown  Description: 1.  Monitor for areas of redness and/or skin breakdown  2.  Assess vascular access sites hourly  3.  Every 4-6 hours minimum:  Change oxygen saturation probe site  4.  Every 4-6 hours:  If on nasal continuous positive airway pressure, respiratory therapy assess nares and determine need for appliance change or resting period.  Outcome: Progressing     Problem: Safety - Adult  Goal: Free from fall injury  Outcome: Progressing     Problem: Nutrition Deficit:  Goal: Optimize nutritional status  Outcome: Progressing      "Nursing report ED to floor  Krupa Mcmanus  68 y.o.  female    HPI :   Chief Complaint   Patient presents with   • Altered Mental Status       Admitting doctor:   Anna Rodrigez MD    Admitting diagnosis:   The primary encounter diagnosis was Acute UTI. Diagnoses of Encephalopathy and Dementia with behavioral disturbance, unspecified dementia type (HCC) were also pertinent to this visit.    Code status:   Current Code Status     Date Active Code Status Order ID Comments User Context       8/18/2022 1747 CPR (Attempt to Resuscitate) [id number]  Anna Rodrigez MD ED     Advance Care Planning Activity      Questions for Current Code Status     Question Answer    Code Status (Patient has no pulse and is not breathing) CPR (Attempt to Resuscitate)    Medical Interventions (Patient has pulse or is breathing) Full          Allergies:   Levofloxacin, Sulfamethoxazole, Amoxicillin, Benadryl [diphenhydramine], Ceclor [cefaclor], Penicillins, and Zyprexa [olanzapine]    Intake and Output    Intake/Output Summary (Last 24 hours) at 8/18/2022 1837  Last data filed at 8/18/2022 1826  Gross per 24 hour   Intake 100 ml   Output --   Net 100 ml       Weight:       08/18/22  1809   Weight: 111 kg (245 lb)       Most recent vitals:   Vitals:    08/18/22 1738 08/18/22 1746 08/18/22 1809 08/18/22 1834   BP: 94/43   117/63   Pulse:  86  88   Resp:   18 18   Temp:   98.4 °F (36.9 °C)    TempSrc:   Oral    SpO2:  100% 96% 99%   Weight:   111 kg (245 lb)    Height:   167.6 cm (66\")        Active LDAs/IV Access:   Lines, Drains & Airways     Active LDAs     Name Placement date Placement time Site Days    Peripheral IV 08/18/22 1642 Right Wrist 08/18/22  1642  Wrist  less than 1                Labs (abnormal labs have a star):   Labs Reviewed   COMPREHENSIVE METABOLIC PANEL - Abnormal; Notable for the following components:       Result Value    Glucose 177 (*)     CO2 19.5 (*)     ALT (SGPT) 41 (*)     AST (SGOT) 38 (*)     " Anion Gap 17.5 (*)     All other components within normal limits    Narrative:     GFR Normal >60  Chronic Kidney Disease <60  Kidney Failure <15     URINALYSIS W/ CULTURE IF INDICATED - Abnormal; Notable for the following components:    Appearance, UA Cloudy (*)     Leuk Esterase, UA Moderate (2+) (*)     Nitrite, UA Positive (*)     All other components within normal limits    Narrative:     In absence of clinical symptoms, the presence of pyuria, bacteria, and/or nitrites on the urinalysis result does not correlate with infection.   CBC WITH AUTO DIFFERENTIAL - Abnormal; Notable for the following components:    Hemoglobin 11.5 (*)     MCH 25.6 (*)     RDW 16.1 (*)     Lymphocyte % 17.0 (*)     Neutrophils, Absolute 7.11 (*)     All other components within normal limits   URINALYSIS, MICROSCOPIC ONLY - Abnormal; Notable for the following components:    WBC, UA Too Numerous to Count (*)     Bacteria, UA 4+ (*)     All other components within normal limits   COVID-19,[facility] IN-HOUSE CEPHEID/MERCEDES, NP SWAB IN TRANSPORT MEDIA 8-12 HR TAT - Normal    Narrative:     Fact sheet for providers: https://www.fda.gov/media/617613/download     Fact sheet for patients: https://www.fda.gov/media/041899/download   TROPONIN (IN-HOUSE) - Normal    Narrative:     Troponin T Reference Range:  <= 0.03 ng/mL-   Negative for AMI  >0.03 ng/mL-     Abnormal for myocardial necrosis.  Clinicians would have to utilize clinical acumen, EKG, Troponin and serial changes to determine if it is an Acute Myocardial Infarction or myocardial injury due to an underlying chronic condition.       Results may be falsely decreased if patient taking Biotin.     MAGNESIUM - Normal   COVID PRE-OP / PRE-PROCEDURE SCREENING ORDER (NO ISOLATION)    Narrative:     The following orders were created for panel order COVID PRE-OP / PRE-PROCEDURE SCREENING ORDER (NO ISOLATION) - Swab, Nasopharynx.  Procedure                               Abnormality         Status                      ---------                               -----------         ------                     COVID-19,[facility] IN-HOUSE...[[id number]]  Normal              Final result                 Please view results for these tests on the individual orders.   URINE CULTURE   RAINBOW DRAW    Narrative:     The following orders were created for panel order [place] Draw.  Procedure                               Abnormality         Status                     ---------                               -----------         ------                     Green Top (Gel)[[id number]]                                  Final result               Lavender Top[[id number]]                                     Final result               Gold Top - SST[[id number]]                                   Final result               Light Blue Top[[id number]]                                   Final result                 Please view results for these tests on the individual orders.   ETHANOL   URINE DRUG SCREEN   LACTIC ACID, PLASMA   POCT GLUCOSE FINGERSTICK   CBC AND DIFFERENTIAL    Narrative:     The following orders were created for panel order CBC & Differential.  Procedure                               Abnormality         Status                     ---------                               -----------         ------                     CBC Auto Differential[[id number]]        Abnormal            Final result                 Please view results for these tests on the individual orders.   GREEN TOP   LAVENDER TOP   GOLD TOP - SST   LIGHT BLUE TOP       EKG:   ECG 12 Lead   Final Result   HEART RATE= 94  bpm   RR Interval= 638  ms   WI Interval= 206  ms   P Horizontal Axis= -31  deg   P Front Axis= 31  deg   QRSD Interval= 93  ms   QT Interval= 358  ms   QRS Axis= 23  deg   T Wave Axis= 90  deg   - ABNORMAL ECG -   Sinus rhythm   Posterior infarct, old   Nonspecific T abnormalities, lateral leads   No change from previous tracing   Electronically Signed By:  Lu Ventura ([facility]) 18-Aug-2022 17:36:45   Date and Time of Study: 2022-08-18 16:49:53          Meds given in ED:   Medications   sodium chloride 0.9 % flush 10 mL (10 mL Intravenous Given 8/18/22 1643)   sodium chloride 0.9 % bolus 1,000 mL (has no administration in time range)   nitroglycerin (NITROSTAT) SL tablet 0.4 mg (has no administration in time range)   acetaminophen (TYLENOL) tablet 650 mg (has no administration in time range)   ondansetron (ZOFRAN) tablet 4 mg (has no administration in time range)     Or   ondansetron (ZOFRAN) injection 4 mg (has no administration in time range)   melatonin tablet 3 mg (has no administration in time range)   droperidol (INAPSINE) injection 5 mg (5 mg Intravenous Given 8/18/22 1642)   cefTRIAXone (ROCEPHIN) 1 g in sodium chloride 0.9 % 100 mL IVPB-VTB (0 g Intravenous Stopped 8/18/22 1826)   droperidol (INAPSINE) injection 5 mg (5 mg Intravenous Given 8/18/22 1831)       Imaging results:  No radiology results for the last day    Ambulatory status:   -       Social issues:   Social History     Socioeconomic History   • Marital status:    Tobacco Use   • Smoking status: Former Smoker   Substance and Sexual Activity   • Alcohol use: Never       NIH Stroke Scale:        Nursing report ED to floor:

## 2024-03-11 NOTE — PROGRESS NOTES
Nephrology Progress Note        [address], [address]  [place] [address]  Phone: [phone]  Office Hours: 8:30AM - 4:30PM  Monday - Friday        3/11/2024 8:28 AM  Subjective:   Admit Date: 3/8/2024  PCP: No primary care provider on file.  Interval History:   Doing ok  Reports leg swelling    Diet: ADULT ORAL NUTRITION SUPPLEMENT; Breakfast, Lunch; Wound Healing Oral Supplement  ADULT DIET; Dysphagia - Soft and Bite Sized; 2000 ml; Likes extra whipped cream and gravies      Data:   Scheduled Meds:   calcitRIOL  0.5 mcg Oral BID    Vitamin D  5,000 Units Oral Daily    polyethylene glycol  17 g Oral BID    epoetin abdirashid-epbx  10,000 Units SubCUTAneous Once per day on Tue Thu Sat    methylPREDNISolone  500 mg IntraVENous Daily    sodium chloride flush  5-40 mL IntraVENous 2 times per day    heparin (porcine)  5,000 Units SubCUTAneous BID     Continuous Infusions:   sodium chloride 20 mL/hr at 03/09/24 1038     PRN Meds:melatonin, sodium chloride flush, sodium chloride, potassium chloride **OR** potassium alternative oral replacement **OR** potassium chloride, magnesium sulfate, ondansetron **OR** ondansetron, acetaminophen **OR** acetaminophen  I/O last 3 completed shifts:  In: 920 [P.O.:920]  Out: 400 [Urine:400]  No intake/output data recorded.    Intake/Output Summary (Last 24 hours) at 3/11/2024 0828  Last data filed at 3/11/2024 0022  Gross per 24 hour   Intake 920 ml   Output --   Net 920 ml       CBC:   Recent Labs     03/09/24  0535 03/10/24  0502 03/11/24  0246   WBC 8.5 6.7 10.6*   HGB 7.4* 7.6* 6.9*    168 202       BMP:    Recent Labs     03/09/24  0535 03/10/24  0502 03/11/24  0246   * 139 138   K 5.1 4.9 4.4   * 104 102   CO2 19* 20* 21   BUN 72* 84* 101*   CREATININE 6.9* 7.1* 6.9*   GLUCOSE 92 167* 161*   CALCIUM 6.6* 7.2* 6.8*     Hepatic:   Recent Labs     03/09/24  0535 03/10/24  0502 03/11/24  0246   AST 15 14* 13*   ALT 7* 8* 7*   BILITOT 0.3 0.3 0.2    ALKPHOS 64 67 71         Objective:   Vitals: /61   Pulse 77   Temp 97.5 °F (36.4 °C) (Oral)   Resp 18   Ht 1.626 m (5' 4\")   Wt 44.4 kg (97 lb 14.2 oz)   SpO2 94%   BMI 16.80 kg/m²   General appearance: alert and cooperative with exam, in no acute distress  HEENT: normocephalic, atraumatic,   Neck: supple, trachea midline  Lungs: breathing comfortably on room air  Heart:: regular rate and rhythm,   Extremities: some trace leg edema  Neurologic: alert, oriented, follows commands, interactive    MEDICAL DECISION MAKING     -CAROL ANN from ATN at least: Cr 7.1 from 6.9 from baseline 0.7//other ddx include vasculitis, postinfectious GN//no HN on CT A/P, UA 22WBCS and 48RBCS (obtained from sexton cath)  -Hypernatremia  -Metabolic acidosis  -Acute anemia: no thrombocytopenia  -Recent sacral I&D and abx :cipro  -Chronic/subacute sexton cath  -Hypocalcemia from vitamin D deficiency; vitamin D level was less than 10  -Proteinuria  -sacral fracture?     Suggest:  -Renal biopsy not doable with Hgb of 6.9   -Cr 6.9 from  7.1 from 6.9//Stop ivf since complaining of leg edema  -ANCA, SPEP, UP/C ordered, Hep panel, HIV.  ASO was low at [place] and UrEos were present and C3 and C4 were low which can be seen in lupus nephritis so obtain KEVAN  -Avoid nsaids  -MOnitor UOP  -Solumedrol 500mg iv daily for 3 doses, was initiated on 3/9/24.   -Give few doses of retacrit to help the anemia  -Obtain 24hr urine protein  -Replete Vit D  -Give calcitriol                  Electronically signed by Richard Elizondo DO on 3/11/2024 at 8:28 AM    [facility]  MD JAYDEN BRYAN DO  [address],  [address]  [place] [address]  PHONE: [phone]  FAX: [phone]

## 2024-03-11 NOTE — PROGRESS NOTES
Neurosurgery Progress Note  3/11/2024 1:22 PM      Assessment and Plan:   Left sacral insufficiency fracture-patient seen by Dr. Escobar yesterday.  Fracture appears to be subacute versus chronic.  Patient does continue to voice pain in her left buttock, she does have an open wound in that area.  She understands that she can work with therapy and bear weight as tolerated.  She also understands that she will follow-up with Dr. Escobar in 2 weeks after discharge.  Discharge instructions placed in AVS.  We will sign off at this time, please reconsult if there are questions or concerns.  Sacral decubitus ulcer-had surgical debridement during last admission earlier this year.  Wound care and possible surgical consult pending.  CAROL ANN-nephrology following, creatinine 6.3 on presentation.    Supervising physician: Abdulkadir Escobar IV, DO.  Dr. Escobar was readily and continuously available by phone for direct consultation regarding the care of this patient.    Dragon dictation may have been used in the writing of this note. Spelling or word errors may have occurred. Please call for clarification if there are concerns.      Subjective:   Admit Date: 3/8/2024  PCP: No primary care provider on file.    Patient sitting up in bed eating breakfast.  She does continue to complain of pain in her left buttock.  Does not have any radiation of pain into her lower extremities, has no numbness/tingling.  Has no saddle paresthesias.    Data:   Scheduled Meds:   calcitRIOL  0.5 mcg Oral BID    [START ON 3/12/2024] epoetin abdirashid-epbx  10,000 Units SubCUTAneous Once per day on Tue Thu Sat    fluconazole  100 mg Oral Daily    Vitamin D  5,000 Units Oral Daily    polyethylene glycol  17 g Oral BID    methylPREDNISolone  500 mg IntraVENous Daily    sodium chloride flush  5-40 mL IntraVENous 2 times per day    heparin (porcine)  5,000 Units SubCUTAneous BID         I/O last 3 completed shifts:  In: 920 [P.O.:920]  Out: 400 [Urine:400]  No  intake/output data recorded.    Intake/Output Summary (Last 24 hours) at 3/11/2024 1322  Last data filed at 3/11/2024 0022  Gross per 24 hour   Intake 480 ml   Output --   Net 480 ml     CULTURE results: Invalid input(s): \"BLOOD CULTURE\", \"URINE CULTURE\", \"SURGICAL CULTURE\"  CBC:   Recent Labs     03/09/24  0535 03/10/24  0502 03/11/24  0246 03/11/24  1001   WBC 8.5 6.7 10.6*  --    HGB 7.4* 7.6* 6.9* 8.1*    168 202  --      BMP:    Recent Labs     03/09/24 0535 03/10/24  0502 03/11/24  0246   * 139 138   K 5.1 4.9 4.4   * 104 102   CO2 19* 20* 21   BUN 72* 84* 101*   CREATININE 6.9* 7.1* 6.9*   GLUCOSE 92 167* 161*     Hepatic:   Recent Labs     03/09/24 0535 03/10/24  0502 03/11/24  0246   AST 15 14* 13*   ALT 7* 8* 7*   BILITOT 0.3 0.3 0.2   ALKPHOS 64 67 71         IMAGING: available xray, CT, and MRI results reviewed    Objective:   Vitals: /61   Pulse 84   Temp 97.7 °F (36.5 °C) (Oral)   Resp 25   Ht 1.626 m (5' 4\")   Wt 44.4 kg (97 lb 14.2 oz)   SpO2 93%   BMI 16.80 kg/m²   General appearance: alert, sitting up in bed, in no distress, eating breakfast  HEENT: Normocephalic, atraumatic, EOM intact  DERM: No significant rashes or lesions identified  Neurologic: Mental status: Alert and oriented x 4, speech clear and coherent, no facial droop, follows commands briskly, sensation intact in all extremities  Extremities: Bilateral upper extremities 5/5 throughout, no clonus bilaterally, bilateral lower extremities 5/5 throughout, bilateral patellar DTR 2+, no clonus bilaterally  Neurosurgical Incision: none  Drains: none      Electronically signed by Maribel Go PA-C on 3/11/2024 at 1:22 PM

## 2024-03-11 NOTE — PROGRESS NOTES
03/11/24 1709   Encounter Summary   Encounter Overview/Reason  Initial Encounter   Service Provided For: Patient   Referral/Consult From: [facility]   Support System Other (Comment)   Last Encounter  03/11/24   Complexity of Encounter Low   Begin Time 1645   End Time  1700   Total Time Calculated 15 min   Spiritual/Emotional needs   Type Spiritual Support   Grief, Loss, and Adjustments   Type Adjustment to illness   Assessment/Intervention/Outcome   Assessment Coping   Intervention Sustaining Presence/Ministry of presence   Outcome Coping;Encouraged   Plan and Referrals   Plan/Referrals Continue Support (comment)

## 2024-03-11 NOTE — PROGRESS NOTES
Physical Therapy    Chart review complete. RN reports that pt is appropriate to participate with PT. Upon arrival, pt found resting in bed. This PT introduced self and role. Pt refused to participate with PT at this time reporting she is fatigued from being busy all morning. Pt reports that she just completed her blood transfusion and is also quite sore. Educated on importance and benefits to participate with PT. Encouraged pt to participate with bed level therapeutic exercises, pt continued to refuse. Pt requesting for PT to return later this afternoon. PT will re--attempt as time permits.

## 2024-03-12 LAB
ABO/RH: NORMAL
ANION GAP SERPL CALCULATED.3IONS-SCNC: 18 MMOL/L (ref 7–16)
ANTIBODY SCREEN: NEGATIVE
BUN SERPL-MCNC: 123 MG/DL (ref 6–23)
CALCIUM SERPL-MCNC: 7.8 MG/DL (ref 8.3–10.6)
CHLORIDE BLD-SCNC: 99 MMOL/L (ref 99–110)
CO2: 19 MMOL/L (ref 21–32)
COMPONENT: NORMAL
CREAT SERPL-MCNC: 6.9 MG/DL (ref 0.6–1.1)
CROSSMATCH RESULT: NORMAL
GFR SERPL CREATININE-BSD FRML MDRD: 6 ML/MIN/1.73M2
GLUCOSE SERPL-MCNC: 129 MG/DL (ref 70–99)
HAV IGM SERPL QL IA: NON REACTIVE
HBV CORE AB SERPL QL IA: NEGATIVE
HBV CORE IGM SERPL QL IA: NON REACTIVE
HBV SURFACE AG SERPL QL IA: NON REACTIVE
HCV AB SERPL QL IA: NON REACTIVE
MAGNESIUM: 1.8 MG/DL (ref 1.8–2.4)
NUCLEAR IGG SER QL IA: NORMAL
PHOSPHORUS: 8.5 MG/DL (ref 2.5–4.9)
POTASSIUM SERPL-SCNC: 4.3 MMOL/L (ref 3.5–5.1)
SODIUM BLD-SCNC: 136 MMOL/L (ref 135–145)
STATUS: NORMAL
TRANSFUSION STATUS: NORMAL
UNIT DIVISION: 0
UNIT NUMBER: NORMAL

## 2024-03-12 PROCEDURE — 6370000000 HC RX 637 (ALT 250 FOR IP): Performed by: INTERNAL MEDICINE

## 2024-03-12 PROCEDURE — 80074 ACUTE HEPATITIS PANEL: CPT

## 2024-03-12 PROCEDURE — 81050 URINALYSIS VOLUME MEASURE: CPT

## 2024-03-12 PROCEDURE — 6370000000 HC RX 637 (ALT 250 FOR IP): Performed by: FAMILY MEDICINE

## 2024-03-12 PROCEDURE — 80048 BASIC METABOLIC PNL TOTAL CA: CPT

## 2024-03-12 PROCEDURE — 2500000003 HC RX 250 WO HCPCS: Performed by: INTERNAL MEDICINE

## 2024-03-12 PROCEDURE — 2140000000 HC CCU INTERMEDIATE R&B

## 2024-03-12 PROCEDURE — 84156 ASSAY OF PROTEIN URINE: CPT

## 2024-03-12 PROCEDURE — 84100 ASSAY OF PHOSPHORUS: CPT

## 2024-03-12 PROCEDURE — 2580000003 HC RX 258: Performed by: INTERNAL MEDICINE

## 2024-03-12 PROCEDURE — 94761 N-INVAS EAR/PLS OXIMETRY MLT: CPT

## 2024-03-12 PROCEDURE — 6370000000 HC RX 637 (ALT 250 FOR IP): Performed by: STUDENT IN AN ORGANIZED HEALTH CARE EDUCATION/TRAINING PROGRAM

## 2024-03-12 PROCEDURE — 6360000002 HC RX W HCPCS: Performed by: INTERNAL MEDICINE

## 2024-03-12 PROCEDURE — 97530 THERAPEUTIC ACTIVITIES: CPT

## 2024-03-12 PROCEDURE — 36415 COLL VENOUS BLD VENIPUNCTURE: CPT

## 2024-03-12 PROCEDURE — 97110 THERAPEUTIC EXERCISES: CPT

## 2024-03-12 PROCEDURE — 83735 ASSAY OF MAGNESIUM: CPT

## 2024-03-12 RX ORDER — CALCIUM ACETATE 667 MG/1
1 CAPSULE ORAL
Status: DISCONTINUED | OUTPATIENT
Start: 2024-03-12 | End: 2024-03-15

## 2024-03-12 RX ORDER — PREDNISONE 20 MG/1
20 TABLET ORAL DAILY
Status: DISCONTINUED | OUTPATIENT
Start: 2024-03-12 | End: 2024-03-14

## 2024-03-12 RX ORDER — SODIUM BICARBONATE 650 MG/1
650 TABLET ORAL 3 TIMES DAILY
Status: DISCONTINUED | OUTPATIENT
Start: 2024-03-12 | End: 2024-03-15

## 2024-03-12 RX ADMIN — HEPARIN SODIUM 5000 UNITS: 5000 INJECTION INTRAVENOUS; SUBCUTANEOUS at 20:54

## 2024-03-12 RX ADMIN — CALCITRIOL CAPSULES 0.25 MCG 0.5 MCG: 0.25 CAPSULE ORAL at 09:29

## 2024-03-12 RX ADMIN — SODIUM BICARBONATE 650 MG: 650 TABLET ORAL at 20:54

## 2024-03-12 RX ADMIN — SODIUM BICARBONATE 650 MG: 650 TABLET ORAL at 09:31

## 2024-03-12 RX ADMIN — CALCITRIOL CAPSULES 0.25 MCG 0.5 MCG: 0.25 CAPSULE ORAL at 20:54

## 2024-03-12 RX ADMIN — CALCIUM ACETATE 667 MG: 667 CAPSULE ORAL at 16:48

## 2024-03-12 RX ADMIN — FLUCONAZOLE 100 MG: 100 TABLET ORAL at 09:29

## 2024-03-12 RX ADMIN — EPOETIN ALFA-EPBX 10000 UNITS: 10000 INJECTION, SOLUTION INTRAVENOUS; SUBCUTANEOUS at 16:48

## 2024-03-12 RX ADMIN — OXYCODONE HYDROCHLORIDE 5 MG: 5 TABLET ORAL at 17:40

## 2024-03-12 RX ADMIN — Medication 5000 UNITS: at 09:29

## 2024-03-12 RX ADMIN — CALCIUM ACETATE 667 MG: 667 CAPSULE ORAL at 11:57

## 2024-03-12 RX ADMIN — SODIUM CHLORIDE, PRESERVATIVE FREE 10 ML: 5 INJECTION INTRAVENOUS at 20:55

## 2024-03-12 RX ADMIN — HEPARIN SODIUM 5000 UNITS: 5000 INJECTION INTRAVENOUS; SUBCUTANEOUS at 09:29

## 2024-03-12 RX ADMIN — Medication 3 MG: at 01:30

## 2024-03-12 RX ADMIN — PREDNISONE 20 MG: 20 TABLET ORAL at 09:29

## 2024-03-12 RX ADMIN — SODIUM BICARBONATE 650 MG: 650 TABLET ORAL at 13:59

## 2024-03-12 RX ADMIN — OXYCODONE HYDROCHLORIDE 5 MG: 5 TABLET ORAL at 11:58

## 2024-03-12 RX ADMIN — SODIUM CHLORIDE, PRESERVATIVE FREE 10 ML: 5 INJECTION INTRAVENOUS at 09:29

## 2024-03-12 ASSESSMENT — ENCOUNTER SYMPTOMS
COUGH: 0
ABDOMINAL DISTENTION: 0
BACK PAIN: 0
DIARRHEA: 0
SHORTNESS OF BREATH: 0
EYE DISCHARGE: 0
CONSTIPATION: 0
WHEEZING: 0
SORE THROAT: 0

## 2024-03-12 ASSESSMENT — PAIN SCALES - GENERAL
PAINLEVEL_OUTOF10: 3
PAINLEVEL_OUTOF10: 9
PAINLEVEL_OUTOF10: 0
PAINLEVEL_OUTOF10: 5
PAINLEVEL_OUTOF10: 8

## 2024-03-12 ASSESSMENT — PAIN DESCRIPTION - ORIENTATION
ORIENTATION: MID
ORIENTATION: MID

## 2024-03-12 ASSESSMENT — PAIN - FUNCTIONAL ASSESSMENT
PAIN_FUNCTIONAL_ASSESSMENT: PREVENTS OR INTERFERES SOME ACTIVE ACTIVITIES AND ADLS
PAIN_FUNCTIONAL_ASSESSMENT: ACTIVITIES ARE NOT PREVENTED

## 2024-03-12 ASSESSMENT — PAIN DESCRIPTION - LOCATION
LOCATION: COCCYX
LOCATION: COCCYX

## 2024-03-12 ASSESSMENT — PAIN DESCRIPTION - DESCRIPTORS
DESCRIPTORS: ACHING;SORE
DESCRIPTORS: ACHING;SORE

## 2024-03-12 NOTE — PROGRESS NOTES
Physical Therapy    Physical Therapy Treatment Note  Name: Nasima Romero MRN: [id number] :   1950   Date:  3/12/2024   Admission Date: 3/8/2024 Room:  [address] [place] [address]   Restrictions/Precautions:  Restrictions/Precautions  Restrictions/Precautions: General Precautions, Fall Risk         Communication with other providers:  RN   Subjective:  Patient states:  \"I want to try these exercises in my chair today\"   Pain:   Location, Type, Intensity (0/10 to 10/10):  no pain at rest reported at rest, slight discomfort with mobility to buttock area where pt has ulcer.     Objective:    Observation:    Supine in bed. Cooperative with therapy   Objective Measures:     bpm at rest increasing to 137bpm with activity.     Treatment, including education/measures:  Supine to sit: Blake for trunk support with use of bed rail. VCs for sequencing. Inc time and effort     Sit to stand: Blake for fwd weight shift and small amount of lift assist from EOB to RW. VCS for hand sequencing to push from bed vs pull from AD  Stand to sit: Blake for controlling sit to recliner. VCS for hand sequencing back to bed for support   Step pivot: CGA for safety with RW     Ambulation: ~8ft with RW CGA for safety. Dec bg with reciprocal gait pattern. Pt worried about being incontinent of stool with further mobility this date.     Exercises: LAQ, ankle PF/DF, marches, heel slides, hip abduction/adduction, and quad sets performed 10x bilat. Multiple VCS and demonstration for proper technique.     Educated pt on POC, role of PT ,DME use, discharge. Cues provided for sequencing to inc safety and indep with mobility.     Assessment / Impression:    Patient's tolerance of treatment:  good    Adverse Reaction: no  Significant change in status and impact:  no  Barriers to improvement:  activity tolerance, strength, balance, pain, incontinence   Plan for Next Session:    Activity tolerance, strength, balance, gait, transfers, bed mobility   Time in:

## 2024-03-12 NOTE — PROGRESS NOTES
Nephrology Progress Note        [address] [facility], [address]  [place] [address]  Phone: [phone]  Office Hours: 8:30AM - 4:30PM  Monday - Friday        3/12/2024 8:42 AM  Subjective:   Admit Date: 3/8/2024  PCP: No primary care provider on file.  Interval History:   ON room air  UOP unknown though she has a sexton cath    Diet: ADULT ORAL NUTRITION SUPPLEMENT; Breakfast, Lunch; Wound Healing Oral Supplement  ADULT DIET; Dysphagia - Soft and Bite Sized; 2000 ml; Likes extra whipped cream and gravies      Data:   Scheduled Meds:   calcium acetate  1 capsule Oral TID WC    calcitRIOL  0.5 mcg Oral BID    epoetin abdirashid-epbx  10,000 Units SubCUTAneous Once per day on Tue Thu Sat    fluconazole  100 mg Oral Daily    Vitamin D  5,000 Units Oral Daily    polyethylene glycol  17 g Oral BID    sodium chloride flush  5-40 mL IntraVENous 2 times per day    heparin (porcine)  5,000 Units SubCUTAneous BID     Continuous Infusions:   sodium chloride      sodium chloride 20 mL/hr at 03/09/24 1038     PRN Meds:sodium chloride, oxyCODONE, melatonin, sodium chloride flush, sodium chloride, potassium chloride **OR** potassium alternative oral replacement **OR** potassium chloride, magnesium sulfate, ondansetron **OR** ondansetron, acetaminophen **OR** acetaminophen  I/O last 3 completed shifts:  In: 759 [P.O.:480; Blood:279]  Out: -   No intake/output data recorded.    Intake/Output Summary (Last 24 hours) at 3/12/2024 0842  Last data filed at 3/11/2024 1645  Gross per 24 hour   Intake 279.03 ml   Output --   Net 279.03 ml       CBC:   Recent Labs     03/10/24  0502 03/11/24  0246 03/11/24  1001 03/11/24  1400   WBC 6.7 10.6*  --   --    HGB 7.6* 6.9* 8.1* 8.8*    202  --   --        BMP:    Recent Labs     03/10/24  0502 03/11/24  0246 03/12/24  0622    138 136   K 4.9 4.4 4.3    102 99   CO2 20* 21 19*   BUN 84* 101* 123*   CREATININE 7.1* 6.9* 6.9*   GLUCOSE 167* 161* 129*   CALCIUM 7.2* 6.8* 7.8*      Hepatic:   Recent Labs     03/10/24  0502 03/11/24  0246   AST 14* 13*   ALT 8* 7*   BILITOT 0.3 0.2   ALKPHOS 67 71       Objective:   Vitals: /74   Pulse 85   Temp 98 °F (36.7 °C) (Oral)   Resp 19   Ht 1.626 m (5' 4\")   Wt 44.4 kg (97 lb 14.2 oz)   SpO2 94%   BMI 16.80 kg/m²   General appearance: alert and cooperative with exam, in no acute distress  HEENT: normocephalic, atraumatic,   Neck: supple, trachea midline  Lungs: breathing comfortably on room air  Heart:: tachycardic  Abdomen: soft, non-tender; non distended,  Extremities: extremities atraumatic, no cyanosis or edema  Neurologic: alert, oriented, follows commands, interactive    MEDICAL DECISION MAKING     -CAROL ANN from ATN at least: Cr 7.1 from 6.9 from baseline 0.7//other ddx include vasculitis, postinfectious GN//no HN on CT A/P, UA 22WBCS and 48RBCS (obtained from sexton cath)//Solumedrol 500mg iv daily for 3 doses, was initiated on 3/9/24 to 3/11/24;ANCA negative; ASO was low at [place] and UrEos were present and C3 and C4 were low which can be seen in lupus nephritis so obtain KEVAN//HIV negative  -Hypernatremia  -Metabolic acidosis  -Acute anemia: no thrombocytopenia  -Recent sacral I&D and abx :cipro  -Chronic/subacute sexton cath  -Hypocalcemia from vitamin D deficiency; vitamin D level was less than 10  -Proteinuria  -sacral fracture?  -Hyperphos     Suggest:  -Give prednisone 20mg po daily for now to treat as AIN.  She does not seem to want a renal biopsy or HD at this point.  Will continue to monitor  -Start phoslo as phos binder  -Cr 6.9 from  7.1 from 6.9//Stopped ivf since complaining of leg edema  -SPEP, UP/C ordered, Hep panel, HIV.  ASO was low at [place] and UrEos were present and C3 and C4 were low which can be seen in lupus nephritis so obtain KEVAN  -Avoid nsaids  -MOnitor UOP please, she has a sexton cath  -Give few doses of retacrit to help the anemia  -Obtain 24hr urine protein(ordered since the weekend)  -Replete Vit  D  -Give calcitriol                     Electronically signed by Richard Elizondo DO on 3/12/2024 at 8:42 AM    [facility]  MD JAYDEN BRYAN DO  [address] [facility] [facility],  [place] [address]  PHONE: [phone]  FAX: [phone]

## 2024-03-12 NOTE — PROGRESS NOTES
03/12/24 1023   Encounter Summary   Encounter Overview/Reason  Initial Encounter;Follow-up   Service Provided For: Patient   Referral/Consult From: [facility]   Support System Children;Other (Comment)   Last Encounter  03/12/24   Complexity of Encounter High   Begin Time 1000   End Time  1100   Total Time Calculated 60 min   Crisis   Type Follow up   Spiritual/Emotional needs   Type Spiritual Support;Emotional Distress;Difficult news received   Grief, Loss, and Adjustments   Type Adjustment to illness;Life Adjustments   Assessment/Intervention/Outcome   Assessment Anxious;Concerns with suffering;Loneliness;Powerlessness;Stress overload   Intervention Active listening;Discussed belief system/Orthodox practices/massimo;Discussed illness injury and it’s impact;Empowerment;Discussed relationship with God;Explored/Affirmed feelings, thoughts, concerns;Explored Coping Skills/Resources;Nurtured Hope;Prayer (assurance of)/[place]   Outcome Coping;Encouraged;Engaged in conversation;Expressed feelings, needs, and concerns;Expressed Gratitude;Optimistic   Plan and Referrals   Plan/Referrals Continue Support (comment)

## 2024-03-12 NOTE — PLAN OF CARE
Problem: Discharge Planning  Goal: Discharge to home or other facility with appropriate resources  Outcome: Progressing  Flowsheets (Taken 3/12/2024 9235)  Discharge to home or other facility with appropriate resources:   Identify barriers to discharge with patient and caregiver   Arrange for needed discharge resources and transportation as appropriate   Identify discharge learning needs (meds, wound care, etc)   Arrange for interpreters to assist at discharge as needed   Refer to discharge planning if patient needs post-hospital services based on physician order or complex needs related to functional status, cognitive ability or social support system     Problem: Skin/Tissue Integrity  Goal: Absence of new skin breakdown  Description: 1.  Monitor for areas of redness and/or skin breakdown  2.  Assess vascular access sites hourly  3.  Every 4-6 hours minimum:  Change oxygen saturation probe site  4.  Every 4-6 hours:  If on nasal continuous positive airway pressure, respiratory therapy assess nares and determine need for appliance change or resting period.  Outcome: Progressing     Problem: Safety - Adult  Goal: Free from fall injury  Outcome: Progressing     Problem: Nutrition Deficit:  Goal: Optimize nutritional status  Outcome: Progressing     Problem: Pain  Goal: Verbalizes/displays adequate comfort level or baseline comfort level  Outcome: Progressing

## 2024-03-13 LAB
ALBUMIN SERPL-MCNC: 2.5 GM/DL (ref 3.4–5)
ANION GAP SERPL CALCULATED.3IONS-SCNC: 17 MMOL/L (ref 7–16)
BASOPHILS ABSOLUTE: 0 K/CU MM
BASOPHILS RELATIVE PERCENT: 0.1 % (ref 0–1)
BUN SERPL-MCNC: 140 MG/DL (ref 6–23)
CALCIUM SERPL-MCNC: 8.1 MG/DL (ref 8.3–10.6)
CHLORIDE BLD-SCNC: 97 MMOL/L (ref 99–110)
CO2: 21 MMOL/L (ref 21–32)
CREAT SERPL-MCNC: 6.8 MG/DL (ref 0.6–1.1)
DIFFERENTIAL TYPE: ABNORMAL
EOSINOPHILS ABSOLUTE: 0 K/CU MM
EOSINOPHILS RELATIVE PERCENT: 0 % (ref 0–3)
GFR SERPL CREATININE-BSD FRML MDRD: 6 ML/MIN/1.73M2
GLUCOSE SERPL-MCNC: 157 MG/DL (ref 70–99)
HCT VFR BLD CALC: 28.8 % (ref 37–47)
HEMOGLOBIN: 9.3 GM/DL (ref 12.5–16)
IMMATURE NEUTROPHIL %: 2.1 % (ref 0–0.43)
LYMPHOCYTES ABSOLUTE: 0.5 K/CU MM
LYMPHOCYTES RELATIVE PERCENT: 4 % (ref 24–44)
Lab: 24 HRS
MAGNESIUM: 1.8 MG/DL (ref 1.8–2.4)
MCH RBC QN AUTO: 29.8 PG (ref 27–31)
MCHC RBC AUTO-ENTMCNC: 32.3 % (ref 32–36)
MCV RBC AUTO: 92.3 FL (ref 78–100)
MONOCYTES ABSOLUTE: 0.5 K/CU MM
MONOCYTES RELATIVE PERCENT: 4.3 % (ref 0–4)
NUCLEATED RBC %: 0.2 %
PDW BLD-RTO: 15.8 % (ref 11.7–14.9)
PHOSPHORUS: 8.2 MG/DL (ref 2.5–4.9)
PLATELET # BLD: 262 K/CU MM (ref 140–440)
PMV BLD AUTO: 9.4 FL (ref 7.5–11.1)
POTASSIUM SERPL-SCNC: 3.7 MMOL/L (ref 3.5–5.1)
PROTEIN 24 HOUR URINE: 71 MG/24 HR
RBC # BLD: 3.12 M/CU MM (ref 4.2–5.4)
SEGMENTED NEUTROPHILS ABSOLUTE COUNT: 10.8 K/CU MM
SEGMENTED NEUTROPHILS RELATIVE PERCENT: 89.5 % (ref 36–66)
SODIUM BLD-SCNC: 135 MMOL/L (ref 135–145)
TOTAL IMMATURE NEUTOROPHIL: 0.25 K/CU MM
TOTAL NUCLEATED RBC: 0 K/CU MM
VOLUME, (UVOL): 600 MLS
WBC # BLD: 12 K/CU MM (ref 4–10.5)

## 2024-03-13 PROCEDURE — 2140000000 HC CCU INTERMEDIATE R&B

## 2024-03-13 PROCEDURE — 2580000003 HC RX 258: Performed by: INTERNAL MEDICINE

## 2024-03-13 PROCEDURE — 2500000003 HC RX 250 WO HCPCS: Performed by: INTERNAL MEDICINE

## 2024-03-13 PROCEDURE — 80069 RENAL FUNCTION PANEL: CPT

## 2024-03-13 PROCEDURE — 6360000002 HC RX W HCPCS: Performed by: INTERNAL MEDICINE

## 2024-03-13 PROCEDURE — 85025 COMPLETE CBC W/AUTO DIFF WBC: CPT

## 2024-03-13 PROCEDURE — 6370000000 HC RX 637 (ALT 250 FOR IP): Performed by: INTERNAL MEDICINE

## 2024-03-13 PROCEDURE — 84100 ASSAY OF PHOSPHORUS: CPT

## 2024-03-13 PROCEDURE — 6370000000 HC RX 637 (ALT 250 FOR IP): Performed by: STUDENT IN AN ORGANIZED HEALTH CARE EDUCATION/TRAINING PROGRAM

## 2024-03-13 PROCEDURE — 97116 GAIT TRAINING THERAPY: CPT

## 2024-03-13 PROCEDURE — 6370000000 HC RX 637 (ALT 250 FOR IP): Performed by: FAMILY MEDICINE

## 2024-03-13 PROCEDURE — 80048 BASIC METABOLIC PNL TOTAL CA: CPT

## 2024-03-13 PROCEDURE — 97530 THERAPEUTIC ACTIVITIES: CPT

## 2024-03-13 PROCEDURE — 36415 COLL VENOUS BLD VENIPUNCTURE: CPT

## 2024-03-13 PROCEDURE — 94150 VITAL CAPACITY TEST: CPT

## 2024-03-13 PROCEDURE — 83735 ASSAY OF MAGNESIUM: CPT

## 2024-03-13 PROCEDURE — 94664 DEMO&/EVAL PT USE INHALER: CPT

## 2024-03-13 PROCEDURE — 97605 NEG PRS WND THER DME<=50SQCM: CPT

## 2024-03-13 PROCEDURE — 94761 N-INVAS EAR/PLS OXIMETRY MLT: CPT

## 2024-03-13 RX ORDER — GUAIFENESIN 600 MG/1
600 TABLET, EXTENDED RELEASE ORAL 2 TIMES DAILY
Status: DISCONTINUED | OUTPATIENT
Start: 2024-03-13 | End: 2024-03-16 | Stop reason: HOSPADM

## 2024-03-13 RX ADMIN — GUAIFENESIN 600 MG: 600 TABLET, EXTENDED RELEASE ORAL at 12:16

## 2024-03-13 RX ADMIN — CALCIUM ACETATE 667 MG: 667 CAPSULE ORAL at 12:16

## 2024-03-13 RX ADMIN — POLYETHYLENE GLYCOL 3350 17 G: 17 POWDER, FOR SOLUTION ORAL at 08:03

## 2024-03-13 RX ADMIN — GUAIFENESIN 600 MG: 600 TABLET, EXTENDED RELEASE ORAL at 21:39

## 2024-03-13 RX ADMIN — PREDNISONE 20 MG: 20 TABLET ORAL at 08:02

## 2024-03-13 RX ADMIN — HEPARIN SODIUM 5000 UNITS: 5000 INJECTION INTRAVENOUS; SUBCUTANEOUS at 08:03

## 2024-03-13 RX ADMIN — SODIUM BICARBONATE 650 MG: 650 TABLET ORAL at 21:39

## 2024-03-13 RX ADMIN — CALCITRIOL CAPSULES 0.25 MCG 0.5 MCG: 0.25 CAPSULE ORAL at 08:02

## 2024-03-13 RX ADMIN — Medication 3 MG: at 21:39

## 2024-03-13 RX ADMIN — CALCIUM ACETATE 667 MG: 667 CAPSULE ORAL at 08:03

## 2024-03-13 RX ADMIN — SODIUM BICARBONATE 650 MG: 650 TABLET ORAL at 08:02

## 2024-03-13 RX ADMIN — OXYCODONE HYDROCHLORIDE 5 MG: 5 TABLET ORAL at 21:39

## 2024-03-13 RX ADMIN — SODIUM BICARBONATE 650 MG: 650 TABLET ORAL at 12:16

## 2024-03-13 RX ADMIN — SODIUM CHLORIDE, PRESERVATIVE FREE 10 ML: 5 INJECTION INTRAVENOUS at 08:03

## 2024-03-13 RX ADMIN — Medication 5000 UNITS: at 08:02

## 2024-03-13 RX ADMIN — HEPARIN SODIUM 5000 UNITS: 5000 INJECTION INTRAVENOUS; SUBCUTANEOUS at 21:30

## 2024-03-13 RX ADMIN — CALCITRIOL CAPSULES 0.25 MCG 0.5 MCG: 0.25 CAPSULE ORAL at 21:39

## 2024-03-13 RX ADMIN — FLUCONAZOLE 100 MG: 100 TABLET ORAL at 08:02

## 2024-03-13 RX ADMIN — SODIUM CHLORIDE, PRESERVATIVE FREE 10 ML: 5 INJECTION INTRAVENOUS at 21:40

## 2024-03-13 ASSESSMENT — PAIN DESCRIPTION - PAIN TYPE: TYPE: CHRONIC PAIN

## 2024-03-13 ASSESSMENT — PAIN - FUNCTIONAL ASSESSMENT: PAIN_FUNCTIONAL_ASSESSMENT: ACTIVITIES ARE NOT PREVENTED

## 2024-03-13 ASSESSMENT — ENCOUNTER SYMPTOMS
WHEEZING: 0
CONSTIPATION: 0
DIARRHEA: 0
EYE DISCHARGE: 0
BACK PAIN: 0
ABDOMINAL DISTENTION: 0
COUGH: 0
SHORTNESS OF BREATH: 0
SORE THROAT: 0

## 2024-03-13 ASSESSMENT — PAIN DESCRIPTION - DESCRIPTORS: DESCRIPTORS: ACHING

## 2024-03-13 ASSESSMENT — PAIN DESCRIPTION - ORIENTATION: ORIENTATION: LOWER

## 2024-03-13 ASSESSMENT — PAIN DESCRIPTION - ONSET: ONSET: ON-GOING

## 2024-03-13 ASSESSMENT — PAIN DESCRIPTION - LOCATION: LOCATION: BACK;COCCYX

## 2024-03-13 ASSESSMENT — PAIN DESCRIPTION - FREQUENCY: FREQUENCY: INTERMITTENT

## 2024-03-13 ASSESSMENT — PAIN SCALES - GENERAL: PAINLEVEL_OUTOF10: 1

## 2024-03-13 NOTE — CARE COORDINATION
Pt with hospice consult.  CM in to see Pt, she states she wishes to discuss with her family this evening and will update this CM with hospice decision tomorrow.  Pt denies any needs at this time.      CM following

## 2024-03-13 NOTE — PROGRESS NOTES
V2.[place]  [facility] [place]      Name:  Nasima Romero /Age/Sex: 1950  (74 y.o. female)   MRN & CSN:  [id number] & [id number] Encounter Date/Time: 3/12/2024 9:00 AM EST    [facility] P[facility]: No primary care provider on file.       Hospital Day: 5    Assessment and Plan:   Nasima Romero is a 74 y.o. female with pmh of  anemia, malnutrition, infected decubitus ulcer, failure to thrive  who presents with CAROL ANN (acute kidney injury) (HCC)      Plan:  CAROL ANN   Baseline sCr ~ 0.7-0.8  BUN/Cr 73/6.3 on presentation  -Monitor intake/output; sexton catheter was placed in the ED - unclear if she had any significant retention  -UA positive for possible UTI, urine culture showing Candida albicans, started on fluconazole for 10-day course  -CT abdomen pelvis without contrast notable for sacral insufficiency fracture, moderate colonic stool  -IVF rehydration with NS at 50 cc/hr, switch to sodium bicarb due to acidosis by nephrology  -Consult nephrology, appreciate recs, -Solumedrol 500mg iv daily for 3 doses   -ANCA, SPEP, UP/C ordered, Hep panel, HIV. ASO was low at [place] and UrEos were present and C3 and C4 were low which can be seen in lupus nephritis so obtain KEVAN   - Hb 8.8 post transfusion. Patient currently not leaning towards Renal biopsy or HD.    - still waiting for 24 hr Urine Protein     Abdominal distension-improved  -no complaints of pain  -passing gas and BM, denies any nausea/vomiting  -CT abdomen wo contrast shows large full debris distending the stomach with moderate colonic stool burden, likely due to ileus  -Patient started on scheduled stool softener.  -Monitor clinically     Sacral decubitus ulcer  -bed bound since fall in mid February causing worsening of the chronic sacral wound, on last admission, the wound was found to be necrotic, CT had shown potential abscess and osteomyelitis, underwent I&D down to muscle/fascia/bone, was started on vancomycin and cefepime, surgical culture  PRN  sodium chloride, , PRN  potassium chloride, 40 mEq, PRN   Or  potassium alternative oral replacement, 40 mEq, PRN   Or  potassium chloride, 10 mEq, PRN  magnesium sulfate, 2,000 mg, PRN  ondansetron, 4 mg, Q8H PRN   Or  ondansetron, 4 mg, Q6H PRN  acetaminophen, 650 mg, Q6H PRN   Or  acetaminophen, 650 mg, Q6H PRN        Labs      Recent Results (from the past 24 hour(s))   Magnesium    Collection Time: 03/12/24  6:22 AM   Result Value Ref Range    Magnesium 1.8 1.8 - 2.4 mg/dl   Phosphorus    Collection Time: 03/12/24  6:22 AM   Result Value Ref Range    Phosphorus 8.5 (H) 2.5 - 4.9 MG/DL   Basic Metabolic Panel    Collection Time: 03/12/24  6:22 AM   Result Value Ref Range    Sodium 136 135 - 145 MMOL/L    Potassium 4.3 3.5 - 5.1 MMOL/L    Chloride 99 99 - 110 mMol/L    CO2 19 (L) 21 - 32 MMOL/L    Anion Gap 18 (H) 7 - 16    Glucose 129 (H) 70 - 99 MG/DL     (H) 6 - 23 MG/DL    Creatinine 6.9 (H) 0.6 - 1.1 MG/DL    Est, Glom Filt Rate 6 (L) >60 mL/min/1.73m2    Calcium 7.8 (L) 8.3 - 10.6 MG/DL   Hepatitis Panel, Acute    Collection Time: 03/12/24  6:22 AM   Result Value Ref Range    Hepatitis B Surface Ag NON REACTIVE NON REACTIVE    Hep A IgM NON REACTIVE NON REACTIVE    Hep B Core Ab, IgM NON REACTIVE NON REACTIVE    Hepatitis C Ab NON REACTIVE NON REACTIVE        Imaging/Diagnostics Last 24 Hours   CT ABDOMEN PELVIS WO CONTRAST Additional Contrast? None    Result Date: 3/8/2024  Reason: Abdominal distention CT the abdomen pelvis without contrast TECHNIQUE: Collimated helical images are made from the lower lungs through the pubic symphysis without intravenous contrast. Up-to-date CT equipment and radiation dose reduction techniques were employed. Findings CT of the abdomen without contrast: Small bilateral pleural effusions are identified. Marked distention of the stomach with food debris is identified. Moderate amount of stool identified throughout the colon. No hydronephrosis identified involving  either kidney. A small amount of ascites surrounds the liver. Findings CT the pelvis: Small amount of free fluid the posterior pelvis. A linear sclerotic focus identified leftward sacrum. Also lucency through this region is identified.     1. Findings suspicious for possible sacral insufficiency fracture on the left. This can be further corroborated by MRI 2. Large amount of food debris distending the stomach. This is nonspecific. This could be related to a gastric ileus versus some form of gastric outlet obstruction 3. Moderate colonic stool 4. Nonspecific perihepatic ascites 5. Small bilateral pleural effusions with compressive atelectasis of adjacent lung parenchyma Electronically signed by MD Gerber Chong      Electronically signed by David Swartz MD on 3/12/2024 at 10:18 PM

## 2024-03-13 NOTE — PROGRESS NOTES
Nephrology Progress Note        [address] [facility], [address]  [place] [address]  Phone: [phone]  Office Hours: 8:30AM - 4:30PM  Monday - Friday        3/13/2024 8:11 AM  Subjective:   Admit Date: 3/8/2024  PCP: No primary care provider on file.  Interval History:   On room air  Coughing  She tells me that she does not want any procedures    Diet: ADULT ORAL NUTRITION SUPPLEMENT; Breakfast, Lunch; Wound Healing Oral Supplement  ADULT DIET; Dysphagia - Soft and Bite Sized; 2000 ml; Likes extra whipped cream and gravies      Data:   Scheduled Meds:   calcium acetate  1 capsule Oral TID WC    predniSONE  20 mg Oral Daily    sodium bicarbonate  650 mg Oral TID    calcitRIOL  0.5 mcg Oral BID    epoetin abdirashid-epbx  10,000 Units SubCUTAneous Once per day on Tue Thu Sat    fluconazole  100 mg Oral Daily    Vitamin D  5,000 Units Oral Daily    polyethylene glycol  17 g Oral BID    sodium chloride flush  5-40 mL IntraVENous 2 times per day    heparin (porcine)  5,000 Units SubCUTAneous BID     Continuous Infusions:   sodium chloride      sodium chloride 20 mL/hr at 03/09/24 1038     PRN Meds:sodium chloride, oxyCODONE, melatonin, sodium chloride flush, sodium chloride, potassium chloride **OR** potassium alternative oral replacement **OR** potassium chloride, magnesium sulfate, ondansetron **OR** ondansetron, acetaminophen **OR** acetaminophen  I/O last 3 completed shifts:  In: 610 [P.O.:600; I.V.:10]  Out: 600 [Urine:600]  No intake/output data recorded.    Intake/Output Summary (Last 24 hours) at 3/13/2024 0811  Last data filed at 3/12/2024 1412  Gross per 24 hour   Intake 610 ml   Output 600 ml   Net 10 ml       CBC:   Recent Labs     03/11/24  0246 03/11/24  1001 03/11/24  1400   WBC 10.6*  --   --    HGB 6.9* 8.1* 8.8*     --   --        BMP:    Recent Labs     03/11/24  0246 03/12/24  0622    136   K 4.4 4.3    99   CO2 21 19*   * 123*   CREATININE 6.9* 6.9*   GLUCOSE 161* 129*  114  [place] [id number]  PHONE: [phone]  FAX: [phone]

## 2024-03-13 NOTE — PROGRESS NOTES
Physical Therapy Treatment Note  Name: Nasima Romero MRN: [id number] :   1950   Date:  3/13/2024   Admission Date: 3/8/2024 Room:  [address] [place] [address]   Restrictions/Precautions:  Restrictions/Precautions  Restrictions/Precautions: General Precautions, Fall Risk   , wound vac, sexton   Communication with other providers:  Pt okay to see for therapy per RN  Subjective:  Patient states:  \"What station is [facility] on?\"   Pain:   Location, Type, Intensity (0/10 to 10/10):  Endorses pain at sacral region at wound vac site, does not rate.   Objective:    Observation:  Pt supine in bed upon PTA arrival.   Objective Measures:  Tele, , RR 21, O2 95%, /73 (78)   Treatment, including education/measures:    Therapeutic Activity Training:   Therapeutic activity training was instructed today.  Cues were given for safety, sequence, UE/LE placement, awareness, and balance.    Activities performed today included bed mobility training, sup-sit, sit-stand, SPT.    Mobility:  Rolling: To L with CGA, pt is able to roll and maintain for pericare.   Sup > sit: Min A at trunk to right. Increased time EOB for symptoms of dizziness.   Scooting: CGA with increased time and effort.   STS: CGA to stand from EOB and Recliner, Min A for controlled descent to chair with use for use of armrests. Second STS from recliner PTA donned depends for pt dt continuous bowel movement.   SPT: Ambulating SPT to recliner, CGA cues for navigation.     Gait:  Pt ambulated ~8ft to recliner with RW and CGA for safety. Pt demos forward flexed posture, downward gaze, slow gait speed, small step length, poor foot clearance, decreased overall endurance. PTA provided cues for navigation and upright gaze/posture. Pt demos min correction.     Safety:   Pt returned safely to recliner with chair alarm activated, call light in reach, all needs met.   Assessment / Impression:    Pt tolerated OOB activities well this date but therapy progression limited dt  abundance of fatigue.   Patient's tolerance of treatment:  Fair-   Adverse Reaction: fatigue  Significant change in status and impact:  none  Barriers to improvement:  Disease progression/fatigue.   Plan for Next Session:    Plan to continue OOB activities, pt pending possible hospice per RN.   Time in:  1326  Time out: 1400  Timed treatment minutes:  25  Total treatment time:  34    Previously filed items:  Social/Functional History  Lives With: Son  Type of Home: Trailer  Home Layout: One level  Home Access: Stairs to enter with rails  Entrance Stairs - Number of Steps: 4  Bathroom Shower/Tub: Tub/Shower unit  Bathroom Toilet: Standard  Home Equipment: Walker, 4 wheeled  ADL Assistance: Independent  Ambulation Assistance: Independent  Transfer Assistance: Independent     Long Term Goals  Time Frame for Long Term Goals : In one week:  Long Term Goal 1: Pt will complete all bed mobility with CGAx1  Long Term Goal 2: Pt will complete sit <> stand transfers with CGAx1  Long Term Goal 3: Pt will ambulate 75 feet with CGAx1 with LRAD  Long Term Goal 4: Pt will independently complete 3 sets of 10 reps of BLE AROM exercises       Electronically signed by:    Zoie Dos Santos PTA  3/13/2024, 1:57 PM

## 2024-03-13 NOTE — PROGRESS NOTES
Patient has IV access at this time. IR is waiting to hear back from attending physician as whether PICC line is needed. Please reconsult if tunneled PICC line is needed for discharge.

## 2024-03-13 NOTE — PROGRESS NOTES
V2.0  [facility] Progress Note      Name:  Nasima Romero /Age/Sex: 1950  (74 y.o. female)   MRN & CSN:  [id number] & [id number] Encounter Date/Time: 3/13/2024 9:00 AM EST    [facility] PCP: No primary care provider on file.       Hospital Day: 6    Assessment and Plan:   Nasima Romero is a 74 y.o. female with pmh of  anemia, malnutrition, infected decubitus ulcer, failure to thrive  who presents with CAROL ANN (acute kidney injury) (HCC)      Plan:  CAROL ANN   Baseline sCr ~ 0.7-0.8  BUN/Cr 73/6.3 on presentation  -Monitor intake/output; sexton catheter was placed in the ED - unclear if she had any significant retention  -UA positive for possible UTI, urine culture showing Candida albicans, started on fluconazole for 10-day course  -CT abdomen pelvis without contrast notable for sacral insufficiency fracture, moderate colonic stool  -IVF rehydration with NS at 50 cc/hr, switch to sodium bicarb due to acidosis by nephrology  -Consult nephrology, appreciate recs, -Solumedrol 500mg iv daily for 3 doses   -ANCA, SPEP, UP/C ordered, Hep panel, HIV. ASO was low at [place] and UrEos were present and C3 and C4 were low which can be seen in lupus nephritis so obtain KEVAN   - Hb 8.8 post transfusion. Patient currently not leaning towards Renal biopsy or HD.    - still waiting for 24 hr Urine Protein  Discussed with nephrology - will continue to monitor her renal function  Patient requesting for Home hospice on DC. CM informed and consult palced  Cr trended down slightly to 6.8    Dry cough   Placed on Mucinex. Ordered IS     Abdominal distension-improved  -no complaints of pain  -passing gas and BM, denies any nausea/vomiting  -CT abdomen wo contrast shows large full debris distending the stomach with moderate colonic stool burden, likely due to ileus  -Patient started on scheduled stool softener.  -Monitor clinically     Sacral decubitus ulcer  -bed bound since fall in mid February causing worsening of the  chronic sacral wound, on last admission, the wound was found to be necrotic, CT had shown potential abscess and osteomyelitis, underwent I&D down to muscle/fascia/bone, was started on vancomycin and cefepime, surgical culture grew ecoli, discharged on cipro and metronidazole   -wound care consult  -surgical consult if concern from wound care team     Severe Malnutrition  Failure to thrive  -nutritional supplement  -dietary consult  -difficult to get patient to understand appetite stimulant; discuss again and start  -PT/OT    6.  Sacral insufficiency fracture  -Patient complaining of lower back pain, denies any incontinence  -Orthopedic consulted, appears to be subacute versus chronic in appearance.  No indication for any acute intervention as per orthopedic surgery.ok to ambulate.     CODE STATUS  I personally discussed goals of care and advance care directives with the patient on 3/9.  Patient does not wish to undergo chest compressions or intubation.  Okay with resuscitative medications.  Patient is alert and oriented and has insight into her disease.  Patient has capacity to make medical decisions.  CODE STATUS updated in chart.    Diet ADULT ORAL NUTRITION SUPPLEMENT; Breakfast, Lunch; Wound Healing Oral Supplement  ADULT DIET; Dysphagia - Soft and Bite Sized; 2000 ml; Likes extra whipped cream and gravies   DVT Prophylaxis [] Lovenox, [x]  Heparin, [] SCDs, [] Ambulation,  [] Eliquis, [] Xarelto  [] Coumadin   Code Status Limited   Disposition From: Nursing home  Expected Disposition: Nursing home  Estimated Date of Discharge: 4 - 5 days  Patient requires continued admission due to CAROL ANN   Surrogate Decision Maker/ POA Patient did not choose to elect anyone     Subjective:     Chief Complaint: No chief complaint on file.       Patient seen and evaluated at bedside.  No acute overnight events.  Patient appears to be severely cachectic and debilitated.  Noted to be alert and oriented and conversing appropriately.   negative.        Objective:     Intake/Output Summary (Last 24 hours) at 3/13/2024 1358  Last data filed at 3/12/2024 1412  Gross per 24 hour   Intake 360 ml   Output --   Net 360 ml          Vitals:   Vitals:    03/13/24 0800   BP: 126/68   Pulse: 86   Resp: 23   Temp: 97.9 °F (36.6 °C)   SpO2: 93%       Physical Exam:   Physical Exam  Vitals and nursing note reviewed.   Constitutional:       General: She is not in acute distress.     Appearance: She is ill-appearing.   HENT:      Head: Normocephalic and atraumatic.      Nose: Nose normal.      Mouth/Throat:      Mouth: Mucous membranes are moist.   Eyes:      Extraocular Movements: Extraocular movements intact.      Pupils: Pupils are equal, round, and reactive to light.   Cardiovascular:      Rate and Rhythm: Normal rate and regular rhythm.      Pulses: Normal pulses.      Heart sounds: Normal heart sounds.   Pulmonary:      Effort: Pulmonary effort is normal.      Breath sounds: Normal breath sounds.   Abdominal:      Palpations: Abdomen is soft.   Musculoskeletal:         General: Normal range of motion.      Cervical back: Normal range of motion.   Skin:     General: Skin is warm.      Capillary Refill: Capillary refill takes less than 2 seconds.      Findings: Lesion present.   Neurological:      General: No focal deficit present.      Mental Status: She is alert and oriented to person, place, and time.   Psychiatric:         Mood and Affect: Mood normal.         Behavior: Behavior normal.            Medications:   Medications:    guaiFENesin  600 mg Oral BID    calcium acetate  1 capsule Oral TID WC    predniSONE  20 mg Oral Daily    sodium bicarbonate  650 mg Oral TID    calcitRIOL  0.5 mcg Oral BID    epoetin abdirashid-epbx  10,000 Units SubCUTAneous Once per day on Tue Thu Sat    fluconazole  100 mg Oral Daily    Vitamin D  5,000 Units Oral Daily    polyethylene glycol  17 g Oral BID    sodium chloride flush  5-40 mL IntraVENous 2 times per day    heparin

## 2024-03-13 NOTE — PROGRESS NOTES
Patient instructed and educated on Incentive Spirometer. Patient able to do 100 ml. Vital capacity.  Patient's goal is 1750ml. Electronically signed by Teddy Dunaway RCP on 3/13/2024 at 4:50 PM

## 2024-03-13 NOTE — CONSULTS
[facility] Nurse  Consult Note       Nasima Romero  AGE: 74 y.o.   GENDER: female  : 1950  TODAY'S DATE:  3/13/2024    Subjective:     Reason for Evaluation and Assessment: wound vac change       Nasima Romero is a 74 y.o. female referred by:   [] Physician  [] Nursing  [] Other:     Wound Identification:  Wound Type: pressure  Contributing Factors: chronic pressure, decreased mobility, shear force, incontinence of stool, and incontinence of urine        PAST MEDICAL HISTORY    No past medical history on file.    PAST SURGICAL HISTORY    Past Surgical History:   Procedure Laterality Date    ABSCESS DRAINAGE N/A 2024    INCISION AND DRAINAGE OF BILATERAL DECUBITUS WITH ABSCESS performed by Guanakito Huerta DO at [facility] OR    WRIST SURGERY         FAMILY HISTORY    No family history on file.    SOCIAL HISTORY    Social History     Tobacco Use    Smoking status: Never   Vaping Use    Vaping Use: Never used       ALLERGIES    Allergies   Allergen Reactions    Codeine     Darvon [Propoxyphene]     Tylenol [Acetaminophen]        MEDICATIONS    No current facility-administered medications on file prior to encounter.     Current Outpatient Medications on File Prior to Encounter   Medication Sig Dispense Refill    mirtazapine (REMERON) 15 MG tablet Take 1 tablet by mouth nightly 30 tablet 3    Ergocalciferol (VITAMIN D) 92379 units CAPS Take 50,000 Units by mouth once a week for 3 doses 5 capsule 0         Objective:      /68   Pulse 86   Temp 97.9 °F (36.6 °C) (Oral)   Resp 23   Ht 1.626 m (5' 4\")   Wt 44.4 kg (97 lb 14.2 oz)   SpO2 93%   BMI 16.80 kg/m²   Miguel Risk Score: Miguel Scale Score: 11    LABS    CBC:   Lab Results   Component Value Date/Time    WBC 10.6 2024 02:46 AM    RBC 2.32 2024 02:46 AM    HGB 8.8 2024 02:00 PM    HCT 27.8 2024 02:00 PM    MCV 94.4 2024 02:46 AM    MCH 29.7 2024 02:46 AM    MCHC 31.5 2024 02:46 AM    RDW  15.9 03/11/2024 02:46 AM     03/11/2024 02:46 AM    MPV 9.7 03/11/2024 02:46 AM     CMP:    Lab Results   Component Value Date/Time     03/12/2024 06:22 AM    K 4.3 03/12/2024 06:22 AM    CL 99 03/12/2024 06:22 AM    CO2 19 03/12/2024 06:22 AM     03/12/2024 06:22 AM    CREATININE 6.9 03/12/2024 06:22 AM    LABGLOM 6 03/12/2024 06:22 AM    GLUCOSE 129 03/12/2024 06:22 AM    PROT 3.7 03/11/2024 02:46 AM    LABALBU 2.3 03/11/2024 02:46 AM    CALCIUM 7.8 03/12/2024 06:22 AM    BILITOT 0.2 03/11/2024 02:46 AM    ALKPHOS 71 03/11/2024 02:46 AM    AST 13 03/11/2024 02:46 AM    ALT 7 03/11/2024 02:46 AM     Albumin:    Lab Results   Component Value Date/Time    LABALBU 2.3 03/11/2024 02:46 AM     PT/INR:    Lab Results   Component Value Date/Time    PROTIME 15.7 02/24/2024 04:59 AM    INR 1.2 02/24/2024 04:59 AM     HgBA1c:  No results found for: \"LABA1C\"      Assessment:     Patient Active Problem List   Diagnosis    Fracture, hip, left, closed, initial encounter ([facility])    Severe malnutrition ([facility])    Sacral wound, initial encounter    Pressure injury of sacral region, unstageable (HCC)    CAROL ANN (acute kidney injury) (HCC)    Acute kidney injury (HCC)       Measurements:  Negative Pressure Wound Therapy Sacrum (Active)   Wound Type Pressure ulcer: Stage IV 03/13/24 1015   Unit Type KCI 03/13/24 1015   Dressing Type White Foam;Black Foam 03/13/24 1015   Number of pieces used 3 03/13/24 1015   Number of pieces removed 3 03/13/24 1015   Canister changed? No 03/13/24 1015   Dressing Status New dressing applied 03/13/24 1015   Dressing Changed Changed/New 03/13/24 1015   Drainage Amount None 03/13/24 1015   Drainage Description Serous;Thin 03/13/24 1015   Dressing Change Due 03/15/24 03/13/24 1015   Kellee-wound Assessment Fragile;Intact 03/13/24 1015   Odor None 03/13/24 1015   Number of days: 2       Wound 02/23/24 Sacrum (Active)   Wound Image   03/11/24 1150   Wound Etiology Pressure Stage 4 03/13/24 1015

## 2024-03-14 LAB
ALBUMIN SERPL ELPH-MCNC: 2.2 GM/DL (ref 3.2–5.6)
ALPHA-1-GLOBULIN: 0.4 GM/DL (ref 0.1–0.4)
ALPHA-2-GLOBULIN: 0.7 GM/DL (ref 0.4–1.2)
ANION GAP SERPL CALCULATED.3IONS-SCNC: 17 MMOL/L (ref 7–16)
BASOPHILS ABSOLUTE: 0 K/CU MM
BASOPHILS RELATIVE PERCENT: 0.1 % (ref 0–1)
BETA GLOBULIN: 0.5 GM/DL (ref 0.5–1.3)
BUN SERPL-MCNC: 149 MG/DL (ref 6–23)
CALCIUM SERPL-MCNC: 8 MG/DL (ref 8.3–10.6)
CHLORIDE BLD-SCNC: 101 MMOL/L (ref 99–110)
CO2: 21 MMOL/L (ref 21–32)
CREAT SERPL-MCNC: 7.4 MG/DL (ref 0.6–1.1)
DIFFERENTIAL TYPE: ABNORMAL
EOSINOPHILS ABSOLUTE: 0 K/CU MM
EOSINOPHILS RELATIVE PERCENT: 0 % (ref 0–3)
GAMMA GLOBULIN: 0.6 GM/DL (ref 0.5–1.6)
GFR SERPL CREATININE-BSD FRML MDRD: 5 ML/MIN/1.73M2
GLUCOSE SERPL-MCNC: 118 MG/DL (ref 70–99)
HCT VFR BLD CALC: 27 % (ref 37–47)
HEMOGLOBIN: 8.7 GM/DL (ref 12.5–16)
IMMATURE NEUTROPHIL %: 3.9 % (ref 0–0.43)
LYMPHOCYTES ABSOLUTE: 0.5 K/CU MM
LYMPHOCYTES RELATIVE PERCENT: 5.3 % (ref 24–44)
MCH RBC QN AUTO: 29.9 PG (ref 27–31)
MCHC RBC AUTO-ENTMCNC: 32.2 % (ref 32–36)
MCV RBC AUTO: 92.8 FL (ref 78–100)
MONOCYTES ABSOLUTE: 0.5 K/CU MM
MONOCYTES RELATIVE PERCENT: 6 % (ref 0–4)
NUCLEATED RBC %: 0.2 %
PDW BLD-RTO: 16 % (ref 11.7–14.9)
PLATELET # BLD: 223 K/CU MM (ref 140–440)
PMV BLD AUTO: 9 FL (ref 7.5–11.1)
POTASSIUM SERPL-SCNC: 4.3 MMOL/L (ref 3.5–5.1)
RBC # BLD: 2.91 M/CU MM (ref 4.2–5.4)
SEGMENTED NEUTROPHILS ABSOLUTE COUNT: 7.4 K/CU MM
SEGMENTED NEUTROPHILS RELATIVE PERCENT: 84.7 % (ref 36–66)
SODIUM BLD-SCNC: 139 MMOL/L (ref 135–145)
SPEP INTERPRETATION: ABNORMAL
TOTAL IMMATURE NEUTOROPHIL: 0.34 K/CU MM
TOTAL NUCLEATED RBC: 0 K/CU MM
TOTAL PROTEIN: 4.4 GM/DL (ref 6.4–8.2)
WBC # BLD: 8.7 K/CU MM (ref 4–10.5)

## 2024-03-14 PROCEDURE — 6360000002 HC RX W HCPCS: Performed by: INTERNAL MEDICINE

## 2024-03-14 PROCEDURE — 6370000000 HC RX 637 (ALT 250 FOR IP): Performed by: STUDENT IN AN ORGANIZED HEALTH CARE EDUCATION/TRAINING PROGRAM

## 2024-03-14 PROCEDURE — 36415 COLL VENOUS BLD VENIPUNCTURE: CPT

## 2024-03-14 PROCEDURE — 2580000003 HC RX 258: Performed by: INTERNAL MEDICINE

## 2024-03-14 PROCEDURE — 6370000000 HC RX 637 (ALT 250 FOR IP): Performed by: INTERNAL MEDICINE

## 2024-03-14 PROCEDURE — 2140000000 HC CCU INTERMEDIATE R&B

## 2024-03-14 PROCEDURE — 97535 SELF CARE MNGMENT TRAINING: CPT

## 2024-03-14 PROCEDURE — 2500000003 HC RX 250 WO HCPCS: Performed by: INTERNAL MEDICINE

## 2024-03-14 PROCEDURE — 97530 THERAPEUTIC ACTIVITIES: CPT

## 2024-03-14 PROCEDURE — 80048 BASIC METABOLIC PNL TOTAL CA: CPT

## 2024-03-14 PROCEDURE — 94761 N-INVAS EAR/PLS OXIMETRY MLT: CPT

## 2024-03-14 PROCEDURE — 85025 COMPLETE CBC W/AUTO DIFF WBC: CPT

## 2024-03-14 RX ORDER — PREDNISONE 10 MG/1
10 TABLET ORAL DAILY
Status: DISCONTINUED | OUTPATIENT
Start: 2024-03-14 | End: 2024-03-15

## 2024-03-14 RX ADMIN — SODIUM BICARBONATE 650 MG: 650 TABLET ORAL at 21:01

## 2024-03-14 RX ADMIN — SODIUM BICARBONATE 650 MG: 650 TABLET ORAL at 12:35

## 2024-03-14 RX ADMIN — CALCIUM ACETATE 667 MG: 667 CAPSULE ORAL at 12:35

## 2024-03-14 RX ADMIN — SODIUM CHLORIDE, PRESERVATIVE FREE 10 ML: 5 INJECTION INTRAVENOUS at 09:45

## 2024-03-14 RX ADMIN — GUAIFENESIN 600 MG: 600 TABLET, EXTENDED RELEASE ORAL at 21:01

## 2024-03-14 RX ADMIN — GUAIFENESIN 600 MG: 600 TABLET, EXTENDED RELEASE ORAL at 09:44

## 2024-03-14 RX ADMIN — CALCIUM ACETATE 667 MG: 667 CAPSULE ORAL at 09:44

## 2024-03-14 RX ADMIN — FLUCONAZOLE 100 MG: 100 TABLET ORAL at 09:44

## 2024-03-14 RX ADMIN — SODIUM BICARBONATE 650 MG: 650 TABLET ORAL at 09:44

## 2024-03-14 RX ADMIN — EPOETIN ALFA-EPBX 10000 UNITS: 10000 INJECTION, SOLUTION INTRAVENOUS; SUBCUTANEOUS at 21:00

## 2024-03-14 RX ADMIN — HEPARIN SODIUM 5000 UNITS: 5000 INJECTION INTRAVENOUS; SUBCUTANEOUS at 09:50

## 2024-03-14 RX ADMIN — HEPARIN SODIUM 5000 UNITS: 5000 INJECTION INTRAVENOUS; SUBCUTANEOUS at 21:08

## 2024-03-14 RX ADMIN — SODIUM CHLORIDE, PRESERVATIVE FREE 10 ML: 5 INJECTION INTRAVENOUS at 21:03

## 2024-03-14 RX ADMIN — CALCIUM ACETATE 667 MG: 667 CAPSULE ORAL at 16:41

## 2024-03-14 RX ADMIN — PREDNISONE 10 MG: 10 TABLET ORAL at 09:44

## 2024-03-14 RX ADMIN — Medication 5000 UNITS: at 09:44

## 2024-03-14 ASSESSMENT — ENCOUNTER SYMPTOMS
BACK PAIN: 0
WHEEZING: 0
SORE THROAT: 0
ABDOMINAL DISTENTION: 0
DIARRHEA: 0
COUGH: 0
SHORTNESS OF BREATH: 0
CONSTIPATION: 0
EYE DISCHARGE: 0

## 2024-03-14 ASSESSMENT — PAIN SCALES - GENERAL: PAINLEVEL_OUTOF10: 0

## 2024-03-14 NOTE — PROGRESS NOTES
V2.0  [facility] Hospitalist Progress Note      Name:  Nasima Romero /Age/Sex: 1950  (74 y.o. female)   MRN & CSN:  [id number] & [id number] Encounter Date/Time: 3/14/2024 9:00 AM EST    [facility]:  [facility] PCP: No primary care provider on file.       Hospital Day: 7    Assessment and Plan:   Nasima Romero is a 74 y.o. female with pmh of  anemia, malnutrition, infected decubitus ulcer, failure to thrive  who presents with CAROL ANN (acute kidney injury) (HCC)      Plan:  CAROL ANN   Baseline sCr ~ 0.7-0.8  BUN/Cr 73/6.3 on presentation  -Monitor intake/output; sexton catheter was placed in the ED - unclear if she had any significant retention  -UA positive for possible UTI, urine culture showing Candida albicans, started on fluconazole for 10-day course  -CT abdomen pelvis without contrast notable for sacral insufficiency fracture, moderate colonic stool  -IVF rehydration with NS at 50 cc/hr, switch to sodium bicarb due to acidosis by nephrology  -Consult nephrology, appreciate recs, -Solumedrol 500mg iv daily for 3 doses   -ANCA, SPEP, UP/C ordered, Hep panel, HIV. ASO was low at [place] and UrEos were present and C3 and C4 were low which can be seen in lupus nephritis so obtain KEVAN   - Hb 8.8 post transfusion. Patient currently refusing Renal biopsy or HD.    Discussed with nephrology - will continue to monitor her renal function  Cr trended up to 7.4 today  Referral made to Hospice - Pending Hospice evaluation     Dry cough   Placed on Mucinex. Ordered IS     Abdominal distension-improved  -no complaints of pain  -passing gas and BM, denies any nausea/vomiting  -CT abdomen wo contrast shows large full debris distending the stomach with moderate colonic stool burden, likely due to ileus  -Patient started on scheduled stool softener.  -Monitor clinically     Sacral decubitus ulcer  -bed bound since fall in mid February causing worsening of the chronic sacral wound, on last admission, the wound was found to be necrotic,  3/8/2024  Reason: Abdominal distention CT the abdomen pelvis without contrast TECHNIQUE: Collimated helical images are made from the lower lungs through the pubic symphysis without intravenous contrast. Up-to-date CT equipment and radiation dose reduction techniques were employed. Findings CT of the abdomen without contrast: Small bilateral pleural effusions are identified. Marked distention of the stomach with food debris is identified. Moderate amount of stool identified throughout the colon. No hydronephrosis identified involving either kidney. A small amount of ascites surrounds the liver. Findings CT the pelvis: Small amount of free fluid the posterior pelvis. A linear sclerotic focus identified leftward sacrum. Also lucency through this region is identified.     1. Findings suspicious for possible sacral insufficiency fracture on the left. This can be further corroborated by MRI 2. Large amount of food debris distending the stomach. This is nonspecific. This could be related to a gastric ileus versus some form of gastric outlet obstruction 3. Moderate colonic stool 4. Nonspecific perihepatic ascites 5. Small bilateral pleural effusions with compressive atelectasis of adjacent lung parenchyma Electronically signed by MD Gerber Chong      Electronically signed by David Swartz MD on 3/14/2024 at 12:22 PM

## 2024-03-14 NOTE — PROGRESS NOTES
Occupational Therapy Treatment Note    Name: Nasima Romero MRN: [id number] :   1950   Date:  3/14/2024   Admission Date: 3/8/2024 Room:  [facility]     Restrictions/Precautions:  Restrictions/Precautions  Restrictions/Precautions: General Precautions, Fall Risk           Communication with other providers: Per chart review and Nurse patient is appropriate for therapeutic intervention.     Subjective:  Patient states:  Agreeable to OT therapy. Deny the need for toileting.  Pain:   Location, Type, Intensity (0/10 to 10/10):  8/10 buttocks    Objective:    Observation: In semi yates's position upon arrival.   Objective Measures:  Alert and oriented    Treatment, including education:  Therapeutic Activity Training:   Therapeutic activity training was instructed today.  Cues were given for safety, sequence, UE/LE placement, awareness, and balance.    Bed mobility:Min A for supine>sit for trunk control  Sitting balance:Good on EOB  Sit/stand transfers:Mod A utilizing RW with initiate rep from the bed; however progress Min A. Completed 7 reps in total with emphasis on safety carryover pertaining body mechanics, hand placement and walker position.   Static standing tolerance: ~9 mins total during ADL  Functional Mobility: CGA around the room utilizing RW    Activities performed today included bed mobility training, transfers, functional mobility  to increase strength, activity tolerance to facilitate IND c ADL tasks, func transfers / mobility with G safety awareness carryover    Self Care Training: Completed bathing/dressing on EOB at tabletop by reaching out to improve trunk stability. Tolerated ~9 mins standing tolerance with personal hygiene and grooming task utilizing RW with tabletop. Require 1 UE support at all times. Attempted to perform DAWN face washing without support but demo LOB posterior and Min A to self correct. Demo Max A for LB dressing and bathing; Mod A for UB bathing for thoroughness. CGA while  standing during hygiene with 1 extended RB as PRN.  Cues were given for safety, sequence, UE/LE placement, visual cues, and balance.    Activities performed today included dressing, toileting, personal hygiene, and grooming task.      Assessment / Impression:    Patient's tolerance of treatment: Good  Adverse Reaction: None  Significant change in status and impact: Improved from initial evaluation  Barriers to improvement: None noted    Safety  Patient educated on role of OT , benefits of OT and rationale for therapeutic intervention. Patient safely in chair + alarm set at end of session, with call light/phone in reach, and nursing aware. Gait belt was used for func transfers / mobility.    Plan for Next Session:    Continue OT POC    Time in:  1036  Time out:  1117  Timed treatment minutes:  41  Total treatment time:  41      Electronically signed by:    YAMEL Johnson,   3/14/2024, 8:16 AM

## 2024-03-14 NOTE — PROGRESS NOTES
Nephrology Progress Note        [address], [address]  [place] [address]  Phone: [phone]  Office Hours: 8:30AM - 4:30PM  Monday - Friday        3/14/2024 7:01 AM  Subjective:   Admit Date: 3/8/2024  PCP: No primary care provider on file.  Interval History:     Resting  On room air    Diet: ADULT ORAL NUTRITION SUPPLEMENT; Breakfast, Lunch; Wound Healing Oral Supplement  ADULT DIET; Dysphagia - Soft and Bite Sized; 2000 ml; Likes extra whipped cream and gravies      Data:   Scheduled Meds:   guaiFENesin  600 mg Oral BID    calcium acetate  1 capsule Oral TID WC    predniSONE  20 mg Oral Daily    sodium bicarbonate  650 mg Oral TID    epoetin abdirashid-epbx  10,000 Units SubCUTAneous Once per day on Tue Thu Sat    fluconazole  100 mg Oral Daily    Vitamin D  5,000 Units Oral Daily    polyethylene glycol  17 g Oral BID    sodium chloride flush  5-40 mL IntraVENous 2 times per day    heparin (porcine)  5,000 Units SubCUTAneous BID     Continuous Infusions:   sodium chloride      sodium chloride 20 mL/hr at 03/09/24 1038     PRN Meds:sodium chloride, oxyCODONE, melatonin, sodium chloride flush, sodium chloride, potassium chloride **OR** potassium alternative oral replacement **OR** potassium chloride, magnesium sulfate, ondansetron **OR** ondansetron, acetaminophen **OR** acetaminophen  I/O last 3 completed shifts:  In: 220 [P.O.:220]  Out: 250 [Urine:250]  No intake/output data recorded.    Intake/Output Summary (Last 24 hours) at 3/14/2024 0701  Last data filed at 3/13/2024 2300  Gross per 24 hour   Intake 220 ml   Output 250 ml   Net -30 ml       CBC:   Recent Labs     03/11/24  1400 03/13/24  1205 03/14/24  0549   WBC  --  12.0* 8.7   HGB 8.8* 9.3* 8.7*   PLT  --  262 223       BMP:    Recent Labs     03/12/24  0622 03/13/24  1205    135   K 4.3 3.7   CL 99 97*   CO2 19* 21   * 140*   CREATININE 6.9* 6.8*   GLUCOSE 129* 157*   CALCIUM 7.8* 8.1*       Objective:   Vitals: BP (!) 109/54    Pulse 66   Temp 97.4 °F (36.3 °C) (Oral)   Resp 12   Ht 1.626 m (5' 4\")   Wt 44.4 kg (97 lb 14.2 oz)   SpO2 90%   BMI 16.80 kg/m²   General appearance: in no acute distress  HEENT: normocephalic, atraumatic,   Neck: supple, trachea midline  Lungs: breathing comfortably on room air  Heart:: regular rate and rhythm,   Extremities: trace leg edema      MEDICAL DECISION MAKING     CAROL ANN from ATN at least: Cr 7.1 from 6.9 from baseline 0.7//other ddx include vasculitis, postinfectious GN//no HN on CT A/P, UA 22WBCS and 48RBCS (obtained from sexton cath)//Solumedrol 500mg iv daily for 3 doses, was initiated on 3/9/24 to 3/11/24;ANCA negative; ASO was low at [place] and UrEos were present and C3 and C4 were low which can be seen in lupus nephritis so obtained KEVAN which was negative//HIV negative/Hep panel negative  -Hypernatremia  -Metabolic acidosis  -Acute anemia: no thrombocytopenia  -Recent sacral I&D and abx :cipro  -Chronic/subacute sexton cath  -Hypocalcemia from vitamin D deficiency; vitamin D level was less than 10  -Proteinuria  -sacral fracture?  -Hyperphos     Suggest:  -Cr worse at 7.4 and BUN 140s, plz let shadyw what comes of the hospice discussion  -She tells me that she does not want any procedures such as renal biopsy and HD  -Decrease prednisone from 20mg po daily to 10mg for now to treat as AIN.  She does not seem to want a renal biopsy or HD at this point.  Will continue to monitor  -Avoid nsaids  -Give few doses of retacrit to help the anemia  -Replete Vit D//Give calcitriol  -24 hr urine protein was 70mg and Urine volume was 600ml    Thank you                     Electronically signed by Richard Elizondo DO on 3/14/2024 at 7:01 AM    [facility]  MD JAYDEN BRYAN DO  [address],  [address]  [place] [address]  PHONE: [phone]  FAX: [phone]

## 2024-03-14 NOTE — PROGRESS NOTES
Comprehensive Nutrition Assessment    Type and Reason for Visit:  Reassess    Nutrition Recommendations/Plan:   Continue current diet and supplements from family   Will only send wound healing oral nutrition supplement once daily   Encourage proper hydration/fluids intake/ice sips   Monitor PO intakes, GI status, fluids, labs, and plan of care      Malnutrition Assessment:  Malnutrition Status:  Severe malnutrition (03/10/24 1258)    Context:  Chronic Illness       Nutrition Assessment:    Pt is eating at least half of meals lately. Pt tolerates soft and bite sized diet, drinks at least 1 strawberry ensure complete brought by family daily. Pt states eating more cream of wheat lately to aid with BM, had 2 BM yesterday per RN at bedside. Pt is trialing Felipe for wound healing but cannot tolerate goal volume of BID, will only send once daily until able to tolerate more. Pt has been eating better and gaining more weight. Pt teary-eyed over worsening renal function, noted no plans for dialysis, pt ok with referral to hospice. Will monitor as moderate nutrition risk and monitor goals of care.    Nutrition Related Findings:    GFR 5, , Cr 7.4  +retacrit, deltasone, vitamin D, glycolax, sodium bicarbonate Wound Type: None       Current Nutrition Intake & Therapies:    Average Meal Intake: %, 26-50%  Average Supplements Intake: % (ensure complete)  ADULT ORAL NUTRITION SUPPLEMENT; Breakfast, Lunch; Wound Healing Oral Supplement  ADULT DIET; Dysphagia - Soft and Bite Sized; 2000 ml; Likes extra whipped cream and gravies    Anthropometric Measures:  Height: 162.6 cm (5' 4.02\")  Ideal Body Weight (IBW): 120 lbs (55 kg)    Admission Body Weight: 44 kg (97 lb)  Current Body Weight: 47.6 kg (104 lb 15 oz), 87.4 % IBW. Weight Source: Bed Scale  Current BMI (kg/m2): 18  Usual Body Weight: 40.7 kg (89 lb 9.9 oz) (February 2024 encounter)  % Weight Change (Calculated): 9.2  Weight Adjustment For: No Adjustment

## 2024-03-14 NOTE — CARE COORDINATION
CM in to see Pt to follow up on discharge planning.  Pt agreeable to hospice referral.  List of hospice companies offered, Pt choice of [facility].     CM call to [facility], referral given to Norma.    CM following

## 2024-03-15 LAB
ANION GAP SERPL CALCULATED.3IONS-SCNC: 17 MMOL/L (ref 7–16)
ANISOCYTOSIS: ABNORMAL
BUN SERPL-MCNC: 156 MG/DL (ref 6–23)
CALCIUM SERPL-MCNC: 8.4 MG/DL (ref 8.3–10.6)
CHLORIDE BLD-SCNC: 98 MMOL/L (ref 99–110)
CO2: 22 MMOL/L (ref 21–32)
CREAT SERPL-MCNC: 6.8 MG/DL (ref 0.6–1.1)
DIFFERENTIAL TYPE: ABNORMAL
GFR SERPL CREATININE-BSD FRML MDRD: 6 ML/MIN/1.73M2
GLUCOSE SERPL-MCNC: 102 MG/DL (ref 70–99)
HCT VFR BLD CALC: 33.5 % (ref 37–47)
HEMOGLOBIN: 10.7 GM/DL (ref 12.5–16)
LYMPHOCYTES ABSOLUTE: 0.9 K/CU MM
LYMPHOCYTES RELATIVE PERCENT: 9 % (ref 24–44)
MCH RBC QN AUTO: 29.2 PG (ref 27–31)
MCHC RBC AUTO-ENTMCNC: 31.9 % (ref 32–36)
MCV RBC AUTO: 91.5 FL (ref 78–100)
MONOCYTES ABSOLUTE: 0.5 K/CU MM
MONOCYTES RELATIVE PERCENT: 5 % (ref 0–4)
PDW BLD-RTO: 17 % (ref 11.7–14.9)
PLATELET # BLD: 218 K/CU MM (ref 140–440)
PLT MORPHOLOGY: ABNORMAL
PMV BLD AUTO: 9.2 FL (ref 7.5–11.1)
POLYCHROMASIA: ABNORMAL
POTASSIUM SERPL-SCNC: 4.1 MMOL/L (ref 3.5–5.1)
RBC # BLD: 3.66 M/CU MM (ref 4.2–5.4)
RBC # BLD: ABNORMAL 10*6/UL
SEGMENTED NEUTROPHILS ABSOLUTE COUNT: 8.2 K/CU MM
SEGMENTED NEUTROPHILS RELATIVE PERCENT: 86 % (ref 36–66)
SODIUM BLD-SCNC: 137 MMOL/L (ref 135–145)
WBC # BLD: 9.6 K/CU MM (ref 4–10.5)

## 2024-03-15 PROCEDURE — 2140000000 HC CCU INTERMEDIATE R&B

## 2024-03-15 PROCEDURE — 6370000000 HC RX 637 (ALT 250 FOR IP): Performed by: FAMILY MEDICINE

## 2024-03-15 PROCEDURE — 6370000000 HC RX 637 (ALT 250 FOR IP): Performed by: STUDENT IN AN ORGANIZED HEALTH CARE EDUCATION/TRAINING PROGRAM

## 2024-03-15 PROCEDURE — 85027 COMPLETE CBC AUTOMATED: CPT

## 2024-03-15 PROCEDURE — 85007 BL SMEAR W/DIFF WBC COUNT: CPT

## 2024-03-15 PROCEDURE — 6370000000 HC RX 637 (ALT 250 FOR IP): Performed by: INTERNAL MEDICINE

## 2024-03-15 PROCEDURE — 36415 COLL VENOUS BLD VENIPUNCTURE: CPT

## 2024-03-15 PROCEDURE — 2700000000 HC OXYGEN THERAPY PER DAY

## 2024-03-15 PROCEDURE — 6360000002 HC RX W HCPCS: Performed by: INTERNAL MEDICINE

## 2024-03-15 PROCEDURE — 97605 NEG PRS WND THER DME<=50SQCM: CPT

## 2024-03-15 PROCEDURE — 94761 N-INVAS EAR/PLS OXIMETRY MLT: CPT

## 2024-03-15 PROCEDURE — 94150 VITAL CAPACITY TEST: CPT

## 2024-03-15 PROCEDURE — 2580000003 HC RX 258: Performed by: INTERNAL MEDICINE

## 2024-03-15 PROCEDURE — 80048 BASIC METABOLIC PNL TOTAL CA: CPT

## 2024-03-15 RX ORDER — PREDNISONE 5 MG/1
5 TABLET ORAL DAILY
Status: DISCONTINUED | OUTPATIENT
Start: 2024-03-15 | End: 2024-03-16 | Stop reason: HOSPADM

## 2024-03-15 RX ORDER — SODIUM BICARBONATE 650 MG/1
650 TABLET ORAL 3 TIMES DAILY
Status: DISCONTINUED | OUTPATIENT
Start: 2024-03-15 | End: 2024-03-16 | Stop reason: HOSPADM

## 2024-03-15 RX ORDER — OXYCODONE HYDROCHLORIDE 5 MG/1
7.5 TABLET ORAL EVERY 4 HOURS PRN
Status: DISCONTINUED | OUTPATIENT
Start: 2024-03-15 | End: 2024-03-16 | Stop reason: HOSPADM

## 2024-03-15 RX ADMIN — Medication 3 MG: at 21:08

## 2024-03-15 RX ADMIN — FLUCONAZOLE 100 MG: 100 TABLET ORAL at 10:04

## 2024-03-15 RX ADMIN — SODIUM BICARBONATE 650 MG: 650 TABLET ORAL at 15:30

## 2024-03-15 RX ADMIN — SODIUM BICARBONATE 650 MG: 650 TABLET ORAL at 09:56

## 2024-03-15 RX ADMIN — POLYETHYLENE GLYCOL 3350 17 G: 17 POWDER, FOR SOLUTION ORAL at 21:02

## 2024-03-15 RX ADMIN — GUAIFENESIN 600 MG: 600 TABLET, EXTENDED RELEASE ORAL at 09:56

## 2024-03-15 RX ADMIN — GUAIFENESIN 600 MG: 600 TABLET, EXTENDED RELEASE ORAL at 21:02

## 2024-03-15 RX ADMIN — SODIUM BICARBONATE 650 MG: 650 TABLET ORAL at 21:02

## 2024-03-15 RX ADMIN — HEPARIN SODIUM 5000 UNITS: 5000 INJECTION INTRAVENOUS; SUBCUTANEOUS at 21:03

## 2024-03-15 RX ADMIN — PREDNISONE 5 MG: 5 TABLET ORAL at 09:56

## 2024-03-15 RX ADMIN — OXYCODONE HYDROCHLORIDE 7.5 MG: 5 TABLET ORAL at 21:08

## 2024-03-15 RX ADMIN — Medication 5000 UNITS: at 10:04

## 2024-03-15 RX ADMIN — SODIUM CHLORIDE, PRESERVATIVE FREE 10 ML: 5 INJECTION INTRAVENOUS at 21:03

## 2024-03-15 RX ADMIN — HEPARIN SODIUM 5000 UNITS: 5000 INJECTION INTRAVENOUS; SUBCUTANEOUS at 10:05

## 2024-03-15 ASSESSMENT — PAIN SCALES - GENERAL
PAINLEVEL_OUTOF10: 4
PAINLEVEL_OUTOF10: 0
PAINLEVEL_OUTOF10: 2
PAINLEVEL_OUTOF10: 0

## 2024-03-15 ASSESSMENT — ENCOUNTER SYMPTOMS
COUGH: 0
SHORTNESS OF BREATH: 0
WHEEZING: 0
ABDOMINAL DISTENTION: 0
EYE DISCHARGE: 0
CONSTIPATION: 0
BACK PAIN: 0
SORE THROAT: 0
DIARRHEA: 0

## 2024-03-15 ASSESSMENT — PAIN SCALES - WONG BAKER
WONGBAKER_NUMERICALRESPONSE: 2
WONGBAKER_NUMERICALRESPONSE: HURTS A LITTLE BIT

## 2024-03-15 ASSESSMENT — PAIN DESCRIPTION - LOCATION: LOCATION: BACK;COCCYX

## 2024-03-15 ASSESSMENT — PAIN DESCRIPTION - DESCRIPTORS: DESCRIPTORS: ACHING;DISCOMFORT

## 2024-03-15 ASSESSMENT — PAIN - FUNCTIONAL ASSESSMENT: PAIN_FUNCTIONAL_ASSESSMENT: PREVENTS OR INTERFERES SOME ACTIVE ACTIVITIES AND ADLS

## 2024-03-15 NOTE — CONSULTS
[facility] Continence Nurse  Consult Note       Nasima Romero  AGE: 74 y.o.   GENDER: female  : 1950  TODAY'S DATE:  3/15/2024    Subjective:     Reason for Evaluation and Assessment: wound vac change       Nasima Romero is a 74 y.o. female referred by:   [] Physician  [] Nursing  [] Other:     Wound Identification:  Wound Type: pressure  Contributing Factors: chronic pressure, decreased mobility, shear force, malnutrition, and incontinence of stool        PAST MEDICAL HISTORY    No past medical history on file.    PAST SURGICAL HISTORY    Past Surgical History:   Procedure Laterality Date    ABSCESS DRAINAGE N/A 2024    INCISION AND DRAINAGE OF BILATERAL DECUBITUS WITH ABSCESS performed by Guanakito Huerta DO at [facility] OR    WRIST SURGERY         FAMILY HISTORY    No family history on file.    SOCIAL HISTORY    Social History     Tobacco Use    Smoking status: Never   Vaping Use    Vaping Use: Never used       ALLERGIES    Allergies   Allergen Reactions    Codeine     Darvon [Propoxyphene]     Tylenol [Acetaminophen]        MEDICATIONS    No current facility-administered medications on file prior to encounter.     Current Outpatient Medications on File Prior to Encounter   Medication Sig Dispense Refill    mirtazapine (REMERON) 15 MG tablet Take 1 tablet by mouth nightly 30 tablet 3    Ergocalciferol (VITAMIN D) 49904 units CAPS Take 50,000 Units by mouth once a week for 3 doses 5 capsule 0         Objective:      /69   Pulse 91   Temp 97.3 °F (36.3 °C) (Oral)   Resp 13   Ht 1.626 m (5' 4.02\")   Wt 49 kg (108 lb 0.4 oz)   SpO2 90%   BMI 18.53 kg/m²   Miguel Risk Score: Miguel Scale Score: 12    LABS    CBC:   Lab Results   Component Value Date/Time    WBC 9.6 03/15/2024 09:15 AM    RBC 3.66 03/15/2024 09:15 AM    HGB 10.7 03/15/2024 09:15 AM    HCT 33.5 03/15/2024 09:15 AM    MCV 91.5 03/15/2024 09:15 AM    MCH 29.2 03/15/2024 09:15 AM    MCHC 31.9 03/15/2024 09:15 AM    RDW 17.0  air  Wound 03/08/24 Heel Left nonblanchable red, mushy-Dressing/Treatment: Foam  Wound 03/08/24 Heel Right nonblanchable red, mushy-Dressing/Treatment: Open to air    Alert and agreeable to wound care at this time. White foam and black foam removed from wound bed. Bone remains palpable to coccyx. Foam in place and covered with drape. Suction continuous at 125 MMHG. Incontinent of stool juan luis care given. Turned to left side,placed with pillow. Pillows under  legs to float heels off bed.    Specialty Bed Required : skin guard mattress in place  [x] Low Air Loss   [x] Pressure Redistribution  [] Fluid Immersion  [] Bariatric  [] Total Pressure Relief  [] Other:     Discharge Plan:  Placement for patient upon discharge: to be determined   Hospice Care: not at this time   Patient appropriate for Outpatient Wound Care Center: to be determined     Patient/Caregiver Teaching:  Level of patient/caregiver understanding able to:   No questions at this time. Follow instructions needs encouraged to turn off back for increased periods of time.       Electronically signed by Chana Ny RN, on 3/15/2024 at 11:42 AM

## 2024-03-15 NOTE — CARE COORDINATION
CM in to see Pt to follow up on discharge planning.  Pt meeting with family and hospice at this time.     Per Pt and [facility], discharge will be home with family and hospice services tomorrow 3/16 after equipment has been delivered.    PS to Dr. Cleary to update

## 2024-03-15 NOTE — PROGRESS NOTES
[place]2.0  [facility] [place]      Name:  Nasima Romero /Age/Sex: 1950  (74 y.o. female)   MRN & CSN:  [id number] & [id number] Encounter Date/Time: 3/15/2024 9:00 AM EST    [facility] PCP: No primary care provider on file.       Hospital Day: 8    Assessment and Plan:   Nasima Romero is a 74 y.o. female with pmh of  anemia, malnutrition, infected decubitus ulcer, failure to thrive  who presents with CAROL ANN (acute kidney injury) (HCC)      Plan:  CAROL ANN   Baseline sCr ~ 0.7-0.8  BUN/Cr 73/6.3 on presentation  -Monitor intake/output; sexton catheter was placed in the ED - unclear if she had any significant retention  -UA positive for possible UTI, urine culture showing Candida albicans, started on fluconazole for 10-day course  -CT abdomen pelvis without contrast notable for sacral insufficiency fracture, moderate colonic stool  -IVF rehydration with NS at 50 cc/hr, switch to sodium bicarb due to acidosis by nephrology  -Consult nephrology, appreciate recs, -Solumedrol 500mg iv daily for 3 doses   -ANCA, SPEP, UP/C ordered, Hep panel, HIV. ASO was low at [place] and UrEos were present and C3 and C4 were low which can be seen in lupus nephritis so obtain KEVAN   - Hb 8.8 post transfusion. Patient currently refusing Renal biopsy or HD.    Discussed with nephrology - will continue to monitor her renal function  Cr trended up to 7.4 today  Referral made to Hospice - Pending Hospice evaluation   Planned for Hospice meeting today at 1 PM. Will follow up.     Dry cough   Placed on Mucinex. Ordered IS   Currently needing 2 L oxygen      Abdominal distension-improved  -no complaints of pain  -passing gas and BM, denies any nausea/vomiting  -CT abdomen wo contrast shows large full debris distending the stomach with moderate colonic stool burden, likely due to ileus  -Patient started on scheduled stool softener.  -Monitor clinically     Sacral decubitus ulcer  -bed bound since fall in mid February causing  Differential    Collection Time: 03/15/24  9:15 AM   Result Value Ref Range    WBC 9.6 4.0 - 10.5 K/CU MM    RBC 3.66 (L) 4.2 - 5.4 M/CU MM    Hemoglobin 10.7 (L) 12.5 - 16.0 GM/DL    Hematocrit 33.5 (L) 37 - 47 %    MCV 91.5 78 - 100 FL    MCH 29.2 27 - 31 PG    MCHC 31.9 (L) 32.0 - 36.0 %    RDW 17.0 (H) 11.7 - 14.9 %    Platelets 218 140 - 440 K/CU MM    MPV 9.2 7.5 - 11.1 FL    Segs Relative 86.0 (H) 36 - 66 %    Lymphocytes % 9.0 (L) 24 - 44 %    Monocytes % 5.0 (H) 0 - 4 %    Segs Absolute 8.2 K/CU MM    Lymphocytes Absolute 0.9 K/CU MM    Monocytes Absolute 0.5 K/CU MM    Differential Type MANUAL DIFFERENTIAL     RBC Morphology OCCASIONAL     Anisocytosis 1+     Polychromasia 1+     PLT Morphology PLATELETS NORMAL IN NUMBER AND SIZE    Basic Metabolic Panel    Collection Time: 03/15/24  9:15 AM   Result Value Ref Range    Sodium 137 135 - 145 MMOL/L    Potassium 4.1 3.5 - 5.1 MMOL/L    Chloride 98 (L) 99 - 110 mMol/L    CO2 22 21 - 32 MMOL/L    Anion Gap 17 (H) 7 - 16    Glucose 102 (H) 70 - 99 MG/DL     (H) 6 - 23 MG/DL    Creatinine 6.8 (H) 0.6 - 1.1 MG/DL    Est, Glom Filt Rate 6 (L) >60 mL/min/1.73m2    Calcium 8.4 8.3 - 10.6 MG/DL        Imaging/Diagnostics Last 24 Hours   CT ABDOMEN PELVIS WO CONTRAST Additional Contrast? None    Result Date: 3/8/2024  Reason: Abdominal distention CT the abdomen pelvis without contrast TECHNIQUE: Collimated helical images are made from the lower lungs through the pubic symphysis without intravenous contrast. Up-to-date CT equipment and radiation dose reduction techniques were employed. Findings CT of the abdomen without contrast: Small bilateral pleural effusions are identified. Marked distention of the stomach with food debris is identified. Moderate amount of stool identified throughout the colon. No hydronephrosis identified involving either kidney. A small amount of ascites surrounds the liver. Findings CT the pelvis: Small amount of free fluid the posterior

## 2024-03-15 NOTE — PROGRESS NOTES
Nephrology Progress Note        [address], [address]  [place] [address]  Phone: [phone]  Office Hours: 8:30AM - 4:30PM  Monday - Friday        3/15/2024 8:13 AM  Subjective:   Admit Date: 3/8/2024  PCP: No primary care provider on file.  Interval History:   Doing ok  On room air  She has decided on hospice    Diet: ADULT DIET; Dysphagia - Soft and Bite Sized; 2000 ml; Likes extra whipped cream and gravies  ADULT ORAL NUTRITION SUPPLEMENT; Breakfast; Wound Healing Oral Supplement      Data:   Scheduled Meds:   predniSONE  5 mg Oral Daily    sodium bicarbonate  650 mg Oral TID    guaiFENesin  600 mg Oral BID    fluconazole  100 mg Oral Daily    Vitamin D  5,000 Units Oral Daily    polyethylene glycol  17 g Oral BID    sodium chloride flush  5-40 mL IntraVENous 2 times per day    heparin (porcine)  5,000 Units SubCUTAneous BID     Continuous Infusions:   sodium chloride      sodium chloride 20 mL/hr at 03/09/24 1038     PRN Meds:sodium chloride, oxyCODONE, melatonin, sodium chloride flush, sodium chloride, potassium chloride **OR** potassium alternative oral replacement **OR** potassium chloride, magnesium sulfate, ondansetron **OR** ondansetron, acetaminophen **OR** acetaminophen  I/O last 3 completed shifts:  In: 670 [P.O.:670]  Out: 910 [Urine:910]  No intake/output data recorded.    Intake/Output Summary (Last 24 hours) at 3/15/2024 0813  Last data filed at 3/15/2024 0600  Gross per 24 hour   Intake 450 ml   Output 660 ml   Net -210 ml       CBC:   Recent Labs     03/13/24  1205 03/14/24  0549   WBC 12.0* 8.7   HGB 9.3* 8.7*    223       BMP:    Recent Labs     03/13/24  1205 03/14/24  0549    139   K 3.7 4.3   CL 97* 101   CO2 21 21   * 149*   CREATININE 6.8* 7.4*   GLUCOSE 157* 118*   CALCIUM 8.1* 8.0*         Objective:   Vitals: /69   Pulse 91   Temp 97.3 °F (36.3 °C) (Oral)   Resp 13   Ht 1.626 m (5' 4.02\")   Wt 49 kg (108 lb 0.4 oz)   SpO2 90%   BMI  18.53 kg/m²   General appearance: alert and cooperative with exam, in no acute distress  HEENT: normocephalic, atraumatic,   Neck: supple, trachea midline  Lungs: breathing comfortably on room air  Heart:: regular rate and rhythm,  Extremities: extremities atraumatic, no cyanosis or edema  Neurologic: alert, oriented, follows commands, interactive    MEDICAL DECISION MAKING     -CAROL ANN from ATN at least: Cr 7.1 from 6.9 from baseline 0.7//other ddx include vasculitis, postinfectious GN, AIN vs others//no HN on CT A/P, UA 22WBCS and 48RBCS (obtained from sexton cath)//Solumedrol 500mg iv daily for 3 doses, was initiated on 3/9/24 to 3/11/24;ANCA negative; ASO was low at [place] and UrEos were present and C3 and C4 were low which can be seen in lupus nephritis so obtained KEVAN which was negative//HIV negative/Hep panel negative/SPEP showed no monoclonal gammopathy//24 hr urine protein was 70mg and Urine volume was 600ml   -Hypernatremia  -Metabolic acidosis  -Acute anemia: no thrombocytopenia  -Recent sacral I&D and abx :cipro  -Chronic/subacute sexton cath  -Hypocalcemia from vitamin D deficiency; vitamin D level was less than 10  -Proteinuria  -sacral fracture?  -Hyperphos     Suggest:  -Cr worse at 7.4 and BUN 140s, she has decided on hospice.  Let me know if there is a change in goals of care  -She tells me that she does not want any procedures such as renal biopsy and HD  -Decrease prednisone from 10mg to 5mg for 4days then stop  -Avoid nsaids    Thank you                     Electronically signed by Richard Elizondo DO on 3/15/2024 at 8:13 AM    [facility]  MD JAYDEN BRYAN DO  [address],  [address]  [place] [address]  PHONE: [phone]  FAX: [phone]

## 2024-03-16 VITALS
HEIGHT: 64 IN | OXYGEN SATURATION: 91 % | RESPIRATION RATE: 11 BRPM | DIASTOLIC BLOOD PRESSURE: 62 MMHG | SYSTOLIC BLOOD PRESSURE: 119 MMHG | HEART RATE: 72 BPM | WEIGHT: 109.79 LBS | BODY MASS INDEX: 18.74 KG/M2 | TEMPERATURE: 97.9 F

## 2024-03-16 PROCEDURE — 2580000003 HC RX 258: Performed by: INTERNAL MEDICINE

## 2024-03-16 PROCEDURE — 6360000002 HC RX W HCPCS: Performed by: INTERNAL MEDICINE

## 2024-03-16 PROCEDURE — 6370000000 HC RX 637 (ALT 250 FOR IP): Performed by: STUDENT IN AN ORGANIZED HEALTH CARE EDUCATION/TRAINING PROGRAM

## 2024-03-16 PROCEDURE — 6370000000 HC RX 637 (ALT 250 FOR IP): Performed by: INTERNAL MEDICINE

## 2024-03-16 RX ORDER — FLUCONAZOLE 100 MG/1
100 TABLET ORAL DAILY
Qty: 4 TABLET | Refills: 0 | Status: SHIPPED | OUTPATIENT
Start: 2024-03-17 | End: 2024-03-21

## 2024-03-16 RX ORDER — POLYETHYLENE GLYCOL 3350 17 G/17G
17 POWDER, FOR SOLUTION ORAL 2 TIMES DAILY
Qty: 527 G | Refills: 1 | Status: SHIPPED | OUTPATIENT
Start: 2024-03-16 | End: 2024-04-15

## 2024-03-16 RX ORDER — PREDNISONE 5 MG/1
5 TABLET ORAL DAILY
Qty: 2 TABLET | Refills: 0 | Status: SHIPPED | OUTPATIENT
Start: 2024-03-17 | End: 2024-03-19

## 2024-03-16 RX ORDER — GUAIFENESIN 600 MG/1
600 TABLET, EXTENDED RELEASE ORAL 2 TIMES DAILY
Qty: 60 TABLET | Refills: 0 | Status: SHIPPED | OUTPATIENT
Start: 2024-03-16 | End: 2024-04-15

## 2024-03-16 RX ORDER — SODIUM BICARBONATE 650 MG/1
650 TABLET ORAL 3 TIMES DAILY
Qty: 5 TABLET | Refills: 0 | Status: SHIPPED | OUTPATIENT
Start: 2024-03-16 | End: 2024-03-18

## 2024-03-16 RX ORDER — LANOLIN ALCOHOL/MO/W.PET/CERES
3 CREAM (GRAM) TOPICAL NIGHTLY PRN
Qty: 30 TABLET | Refills: 0 | Status: SHIPPED | OUTPATIENT
Start: 2024-03-16 | End: 2024-04-15

## 2024-03-16 RX ORDER — OXYCODONE HYDROCHLORIDE 5 MG/1
7.5 TABLET ORAL EVERY 6 HOURS PRN
Qty: 12 TABLET | Refills: 0 | Status: SHIPPED | OUTPATIENT
Start: 2024-03-16 | End: 2024-03-19

## 2024-03-16 RX ADMIN — HEPARIN SODIUM 5000 UNITS: 5000 INJECTION INTRAVENOUS; SUBCUTANEOUS at 08:38

## 2024-03-16 RX ADMIN — Medication 5000 UNITS: at 08:45

## 2024-03-16 RX ADMIN — GUAIFENESIN 600 MG: 600 TABLET, EXTENDED RELEASE ORAL at 08:37

## 2024-03-16 RX ADMIN — PREDNISONE 5 MG: 5 TABLET ORAL at 08:37

## 2024-03-16 RX ADMIN — POLYETHYLENE GLYCOL 3350 17 G: 17 POWDER, FOR SOLUTION ORAL at 08:39

## 2024-03-16 RX ADMIN — FLUCONAZOLE 100 MG: 100 TABLET ORAL at 08:37

## 2024-03-16 RX ADMIN — SODIUM CHLORIDE, PRESERVATIVE FREE 10 ML: 5 INJECTION INTRAVENOUS at 08:48

## 2024-03-16 RX ADMIN — SODIUM BICARBONATE 650 MG: 650 TABLET ORAL at 08:37

## 2024-03-16 NOTE — DISCHARGE INSTR - COC
Continuity of Care Form    Patient Name: Nasima Romero   :  1950  MRN:  [id number]    Admit date:  3/8/2024  Discharge date:  ***    Code Status Order: Limited   Advance Directives:     Admitting Physician:  No admitting provider for patient encounter.  PCP: No primary care provider on file.    Discharging Nurse: ***  Discharging Hospital Unit/Room#: 3126/3126-A  Discharging Unit Phone Number: ***    Emergency Contact:   Extended Emergency Contact Information  Primary Emergency Contact: Charito Coelho  Home Phone: [phone]  Relation: Other Relative    Past Surgical History:  Past Surgical History:   Procedure Laterality Date    ABSCESS DRAINAGE N/A 2024    INCISION AND DRAINAGE OF BILATERAL DECUBITUS WITH ABSCESS performed by Guanakito Huerta DO at [facility] OR    WRIST SURGERY         Immunization History:     There is no immunization history on file for this patient.    Active Problems:  Patient Active Problem List   Diagnosis Code    Fracture, hip, left, closed, initial encounter ([facility]) S72.002A    Severe malnutrition ([facility]) E43    Sacral wound, initial encounter S31.000A    Pressure injury of sacral region, unstageable ([facility]) L89.150    CAROL ANN (acute kidney injury) ([facility]) N17.9    Acute kidney injury ([facility]) N17.9       Isolation/Infection:   Isolation            No Isolation          Patient Infection Status       None to display            Nurse Assessment:  Last Vital Signs: /62   Pulse 72   Temp 97.9 °F (36.6 °C) (Oral)   Resp 11   Ht 1.626 m (5' 4.02\")   Wt 49.8 kg (109 lb 12.6 oz)   SpO2 91%   BMI 18.84 kg/m²     Last documented pain score (0-10 scale): Pain Level: 2  Last Weight:   Wt Readings from Last 1 Encounters:   24 49.8 kg (109 lb 12.6 oz)     Mental Status:  {IP PT MENTAL STATUS:41326}    IV Access:  {[facility] IV ACCESS:412113449}    Nursing Mobility/ADLs:  Walking   {CHP DME ADLs:577483549}  Transfer  {CHP DME ADLs:649065969}  Bathing  {CHP DME ADLs:197894917}  Dressing  {CHP  oxygen:90286}  Ventilator:    { CC Vent List:421482660}    Rehab Therapies: {THERAPEUTIC INTERVENTION:6206868276}  Weight Bearing Status/Restrictions: { CC Weight Bearin}  Other Medical Equipment (for information only, NOT a DME order):  {EQUIPMENT:667470982}  Other Treatments: ***    Patient's personal belongings (please select all that are sent with patient):  {CHP DME Belongings:072967932}    RN SIGNATURE:  {Esignature:402092534}    CASE MANAGEMENT/SOCIAL WORK SECTION    Inpatient Status Date: ***    Readmission Risk Assessment Score:  Readmission Risk              Risk of Unplanned Readmission:  18           Discharging to Facility/ Agency   Name:   Address:  Phone:  Fax:    Dialysis Facility (if applicable)   Name:  Address:  Dialysis Schedule:  Phone:  Fax:    / signature: {Esignature:845023590}    PHYSICIAN SECTION    Prognosis: {Prognosis:3544721429}    Condition at Discharge: { Patient Condition:595767143}    Rehab Potential (if transferring to Rehab): {Prognosis:1593127316}    Recommended Labs or Other Treatments After Discharge: ***    Physician Certification: I certify the above information and transfer of Nasima Romero  is necessary for the continuing treatment of the diagnosis listed and that she requires {Admit to Appropriate Level of Care:93987} for {GREATER/LESS:795398664} 30 days.     Update Admission H&P: {CHP DME Changes in HandP:392411439}    PHYSICIAN SIGNATURE:  {Esignature:697708923}

## 2024-03-16 NOTE — PROGRESS NOTES
Patient removed from wound vac and wet to dry dressing applied. Loera catheter staying with patient and hospice states they will follow it.

## 2024-03-16 NOTE — PROGRESS NOTES
Covering for Dr Elizondo  Hospice plans noticed  Labs from yesterday consistent with advanced renal failure  Patient has opted for non aggressive therapy and palliation  Will stop following  Call us back if she changes her mind

## 2024-03-16 NOTE — DISCHARGE SUMMARY
V2.0  Discharge Summary    Name:  Nasima Romero /Age/Sex: 1950 (74 y.o. female)   Admit Date: 3/8/2024  Discharge Date: 3/16/24    MRN & CSN:  [id number] & [id number] Encounter Date and Time 3/16/24 12:21 PM EDT    Attending:  No att. providers found Discharging Provider: David Swartz MD       Hospital Course:     Brief HPI:Nasima Romero is a 74 y.o. female with pmh of anemia, malnutrition, infected decubitus ulcer, failure to thrive, who was initially taken to [facility] ED, with abnormal labs. She was found to have elevated BUN/Cr 73/6.32.  Patient appears clinically hypovolemic and reports that her appetite has not been great and she potentially has not been drinking enough either.  Patient does have distended abdomen without any pain or tenderness which she reports was more distended before.  She reports having good bowel movement (loose), and passing gas.  Sexton catheter was reportedly placed in the ED.  She does not report any fever, chills, rigors.  No change in medication     Brief Problem Based Course:     Patient is opting for hospice. Does not want any aggressive measures including HD. Will DC today with Home hospice. DC with sexton for comfort care. She does have sacral wound.  Her vitals are stable on DC    CAROL ANN   Baseline sCr ~ 0.7-0.8  BUN/Cr 73/6.3 on presentation  -Monitor intake/output; sexton catheter was placed in the ED - unclear if she had any significant retention  -UA positive for possible UTI, urine culture showing Candida albicans, started on fluconazole for 10-day course  -CT abdomen pelvis without contrast notable for sacral insufficiency fracture, moderate colonic stool  -IVF rehydration with NS at 50 cc/hr, switch to sodium bicarb due to acidosis by nephrology  -Consult nephrology, appreciate recs, -Solumedrol 500mg iv daily for 3 doses   -ANCA, SPEP, UP/C ordered, Hep panel, HIV. ASO was low at [place] and UrEos were present and C3 and C4 were low which can be seen in  07:01 PM    BILIRUBINUR NEGATIVE 03/08/2024 07:01 PM    BLOODU MODERATE NUMBER OR AMOUNT OBSERVED 03/08/2024 07:01 PM    KETUA NEGATIVE 03/08/2024 07:01 PM     Urine Cultures: No results found for: \"LABURIN\"  Blood Cultures: No results found for: \"BC\"  No results found for: \"BLOODCULT2\"  Organism: No results found for: \"ORG\"    Time Spent Discharging patient 55 minutes    Electronically signed by David Swartz MD on 3/16/2024 at 12:21 PM

## 2024-03-19 ENCOUNTER — TELEPHONE (OUTPATIENT)
Dept: WOUND CARE | Age: 74
End: 2024-03-19

## 2024-05-21 NOTE — PROGRESS NOTES
Physician Progress Note      PATIENT:               FRANCISCO CANTRELL  CSN #:                  [id number]  :                       1950  ADMIT DATE:       3/8/2024 3:58 PM  DISCH DATE:        3/16/2024 11:52 AM  RESPONDING  PROVIDER #:        David Swartz MD          QUERY TEXT:    Internal Medicine,    Pt admitted with sepsis, UTI, decubitus ulcer. Pt noted to have Sepsis and   acute organ dysfunction of CAROL ANN. ATN is documented by Nephrology. If possible,   please document in progress notes and discharge summary the relationship, if   any, between Sepsis and CAROL ANN/ATN.    The medical record reflects the following:  Risk Factors: sepsis  Clinical Indicators: SIRS criteria: HR over 100, verified up to 97, unverified   RR over 20, unverified O2 sats in 70's and 80's, WBC 10.6,  Treatment: labs,imaging, Nephrology consult, IVF, medical management    Thank you,  Thi Quinones RN CDS  [phone]  Options provided:  -- Acute organ dysfunction of CAROL ANN due to ATN  is associated with sepsis  -- Acute organ dysfunction of CAROL ANN due to ATN  is associated with severe sepsis  -- Acute organ dysfunction of CAROL ANN due to ATN is unrelated to sepsis  -- Other - I will add my own diagnosis  -- Disagree - Not applicable / Not valid  -- Disagree - Clinically unable to determine / Unknown  -- Refer to Clinical Documentation Reviewer    PROVIDER RESPONSE TEXT:    This patient has acute organ dysfunction of CAROL ANN due to ATN associated with   sepsis.    Query created by: Thi Quinones on 2024 4:09 PM      Electronically signed by:  David Swartz MD 2024 6:09 PM

## (undated) DEVICE — Z INACTIVE USE 2863041 SPONGE GZ W4XL4IN 100% COT 16 PLY RADPQ HIGHLY ABSRB

## (undated) DEVICE — LINER,SEMI-RIGID,3000CC,50EA/CS: Brand: MEDLINE

## (undated) DEVICE — TUBING, SUCTION, 9/32" X 10', STRAIGHT: Brand: MEDLINE

## (undated) DEVICE — TUBE CULTURE LF UNIFORM BOTTOM STER

## (undated) DEVICE — SPONGE GZ W4XL8IN COT WVN 12 PLY

## (undated) DEVICE — COUNTER NDL 30 COUNT FOAM STRP SGL MAG

## (undated) DEVICE — TOWEL,OR,DSP,ST,BLUE,STD,6/PK,12PK/CS: Brand: MEDLINE

## (undated) DEVICE — ELECTRODE ES AD CRDLSS PT RET REM POLYHESIVE

## (undated) DEVICE — Z INACTIVE NO ACTIVE SUPPLIER APPLICATOR MEDICATED 26 CC TINT HI-LITE ORNG STRL CHLORAPREP

## (undated) DEVICE — DRAPE SHEET ULTRAGARD: Brand: MEDLINE

## (undated) DEVICE — MARKER SURG SKIN UTIL REGULAR/FINE 2 TIP W/ RUL AND 9 LBL

## (undated) DEVICE — BANDAGE,GAUZE,BULKEE II,4.5"X4.1YD,STRL: Brand: MEDLINE

## (undated) DEVICE — GLOVE ORANGE PI 8   MSG9080

## (undated) DEVICE — SOLUTION IV IRRIG WATER 1000ML POUR BRL 2F7114

## (undated) DEVICE — INTENDED FOR TISSUE SEPARATION, AND OTHER PROCEDURES THAT REQUIRE A SHARP SURGICAL BLADE TO PUNCTURE OR CUT.: Brand: BARD-PARKER ® STAINLESS STEEL BLADES

## (undated) DEVICE — SHEET, T, LAPAROTOMY, STERILE: Brand: MEDLINE

## (undated) DEVICE — PACK,BASIC,IX: Brand: MEDLINE